# Patient Record
Sex: FEMALE | Race: WHITE | NOT HISPANIC OR LATINO | Employment: STUDENT | URBAN - METROPOLITAN AREA
[De-identification: names, ages, dates, MRNs, and addresses within clinical notes are randomized per-mention and may not be internally consistent; named-entity substitution may affect disease eponyms.]

---

## 2017-02-15 ENCOUNTER — PATIENT MESSAGE (OUTPATIENT)
Dept: OBSTETRICS AND GYNECOLOGY | Facility: CLINIC | Age: 22
End: 2017-02-15

## 2017-02-15 DIAGNOSIS — Z30.9 ENCOUNTER FOR CONTRACEPTIVE MANAGEMENT, UNSPECIFIED CONTRACEPTIVE ENCOUNTER TYPE: Primary | ICD-10-CM

## 2017-03-09 ENCOUNTER — TELEPHONE (OUTPATIENT)
Dept: INTERNAL MEDICINE | Facility: CLINIC | Age: 22
End: 2017-03-09

## 2017-03-09 ENCOUNTER — OFFICE VISIT (OUTPATIENT)
Dept: INTERNAL MEDICINE | Facility: CLINIC | Age: 22
End: 2017-03-09
Payer: COMMERCIAL

## 2017-03-09 ENCOUNTER — HOSPITAL ENCOUNTER (OUTPATIENT)
Dept: RADIOLOGY | Facility: HOSPITAL | Age: 22
Discharge: HOME | End: 2017-03-09
Attending: INTERNAL MEDICINE
Payer: COMMERCIAL

## 2017-03-09 VITALS
HEART RATE: 98 BPM | BODY MASS INDEX: 23.99 KG/M2 | WEIGHT: 135.38 LBS | DIASTOLIC BLOOD PRESSURE: 68 MMHG | SYSTOLIC BLOOD PRESSURE: 110 MMHG | HEIGHT: 63 IN | TEMPERATURE: 99 F | RESPIRATION RATE: 16 BRPM

## 2017-03-09 DIAGNOSIS — R07.81 PLEURITIC CHEST PAIN: ICD-10-CM

## 2017-03-09 DIAGNOSIS — J18.9 CAP (COMMUNITY ACQUIRED PNEUMONIA): ICD-10-CM

## 2017-03-09 DIAGNOSIS — J84.10 GRANULOMATOUS LUNG DISEASE: Primary | ICD-10-CM

## 2017-03-09 DIAGNOSIS — Z00.00 ANNUAL PHYSICAL EXAM: Primary | ICD-10-CM

## 2017-03-09 DIAGNOSIS — J84.10 GRANULOMATOUS LUNG DISEASE: ICD-10-CM

## 2017-03-09 DIAGNOSIS — F42.9 OBSESSIVE-COMPULSIVE DISORDER, UNSPECIFIED TYPE: ICD-10-CM

## 2017-03-09 DIAGNOSIS — D64.9 NORMOCYTIC ANEMIA: ICD-10-CM

## 2017-03-09 PROCEDURE — 71260 CT THORAX DX C+: CPT | Mod: TC

## 2017-03-09 PROCEDURE — 99999 PR PBB SHADOW E&M-EST. PATIENT-LVL III: CPT | Mod: PBBFAC,,, | Performed by: INTERNAL MEDICINE

## 2017-03-09 PROCEDURE — 25500020 PHARM REV CODE 255: Performed by: INTERNAL MEDICINE

## 2017-03-09 PROCEDURE — 71020 XR CHEST PA AND LATERAL: CPT | Mod: TC

## 2017-03-09 PROCEDURE — 71260 CT THORAX DX C+: CPT | Mod: 26,,, | Performed by: RADIOLOGY

## 2017-03-09 PROCEDURE — 71020 XR CHEST PA AND LATERAL: CPT | Mod: 26,,, | Performed by: RADIOLOGY

## 2017-03-09 PROCEDURE — 99385 PREV VISIT NEW AGE 18-39: CPT | Mod: S$GLB,,, | Performed by: INTERNAL MEDICINE

## 2017-03-09 RX ORDER — IBUPROFEN 600 MG/1
TABLET ORAL
Qty: 30 TABLET | Refills: 0 | Status: SHIPPED | OUTPATIENT
Start: 2017-03-09 | End: 2017-04-13

## 2017-03-09 RX ORDER — FLUOXETINE 10 MG/1
10 TABLET ORAL DAILY
Qty: 30 TABLET | Refills: 3 | Status: SHIPPED | OUTPATIENT
Start: 2017-03-09 | End: 2018-06-01

## 2017-03-09 RX ADMIN — IOHEXOL 75 ML: 350 INJECTION, SOLUTION INTRAVENOUS at 06:03

## 2017-03-09 NOTE — MR AVS SNAPSHOT
[facility]  [address]  [place] [address]  Phone: [phone]  Fax: [phone]                  Donna Kumar   3/9/2017 1:00 PM   Office Visit    Description:  Female : 1995   Provider:  Brooklynn Boyle MD   Department:  [facility]           Reason for Visit     Establish Care     Chest Pain     Fatigue           Diagnoses this Visit        Comments    Annual physical exam    -  Primary     Obsessive-compulsive disorder, unspecified type         Pleuritic chest pain                To Do List           Future Appointments        Provider Department Dept Phone    3/9/2017 1:50 PM LAB, APPOINTMENT [facility] INTMED [facility] [phone]    3/9/2017 2:15 PM NOM [id number] LB LIMIT [facility] [phone]    2017 3:20 PM MD [facility] Pending sale to [facility] [phone]      Goals (5 Years of Data)     None      Follow-Up and Disposition     Return in about 5 weeks (around 2017).    Follow-up and Disposition History       These Medications        Disp Refills Start End    fluoxetine 10 MG Tab 30 tablet 3 3/9/2017 3/9/2018    Take 1 tablet (10 mg total) by mouth once daily. - Oral    Pharmacy: [facility]/pharmacy #[id number] - [place] - [address] Ph #: [phone]       ibuprofen (ADVIL,MOTRIN) 600 MG tablet 30 tablet 0 3/9/2017     Take 1 tab every 8 hours with food for 2 days and then every 8 hours as needed for pain.    Pharmacy: [facility]/pharmacy #[id number] - [place]  [address] Ph #: [phone]         [facility] On Call     [facility] On Call Nurse Care Line -  Assistance  Registered nurses in the [facility] On Call Center provide clinical advisement, health education, appointment booking, and other advisory services.  Call for this free service at [phone].             Medications           Message regarding Medications     Verify the changes and/or additions to your medication regime  "listed below are the same as discussed with your clinician today.  If any of these changes or additions are incorrect, please notify your healthcare provider.        START taking these NEW medications        Refills    fluoxetine 10 MG Tab 3    Sig: Take 1 tablet (10 mg total) by mouth once daily.    Class: Normal    Route: Oral    ibuprofen (ADVIL,MOTRIN) 600 MG tablet 0    Sig: Take 1 tab every 8 hours with food for 2 days and then every 8 hours as needed for pain.    Class: Normal      STOP taking these medications     ketoconazole (NIZORAL) 2 % cream AAA bid    drospirenone-ethinyl estradiol (RAJAN) 3-0.03 mg per tablet Take 1 tablet by mouth once daily.           Verify that the below list of medications is an accurate representation of the medications you are currently taking.  If none reported, the list may be blank. If incorrect, please contact your healthcare provider. Carry this list with you in case of emergency.           Current Medications     ACZONE 5 % topical gel Apply topically every morning.    norethindrone-ethinyl estradiol (OVCON) 0.4-35 mg-mcg per tablet Take 1 tablet by mouth once daily.    sulfacetamide sodium-sulfur (SULFACLEANSE 8-4) 8-4 % Susp Wash face qhs    tazarotene (TAZORAC) 0.05 % Crea cream Apply topically every evening. Apply thin film to face qhs after moisturizing.    fluoxetine 10 MG Tab Take 1 tablet (10 mg total) by mouth once daily.    ibuprofen (ADVIL,MOTRIN) 600 MG tablet Take 1 tab every 8 hours with food for 2 days and then every 8 hours as needed for pain.           Clinical Reference Information           Your Vitals Were     BP Pulse Temp Resp Height Weight    110/68 98 98.7 °F (37.1 °C) (Oral) 16 5' 3" (1.6 m) 61.4 kg (135 lb 5.8 oz)    Last Period BMI             02/23/2017 23.98 kg/m2         Blood Pressure          Most Recent Value    BP  110/68      Allergies as of 3/9/2017     No Known Drug Allergies      Immunizations Administered on Date of Encounter - " 3/9/2017     None      Orders Placed During Today's Visit     Future Labs/Procedures Expected by Expires    CBC auto differential  3/9/2017 5/8/2018    Comprehensive metabolic panel  3/9/2017 5/8/2018    Lipid panel  3/9/2017 5/8/2018    TSH  3/9/2017 5/8/2018    X-Ray Chest PA And Lateral  3/9/2017 3/9/2018      Language Assistance Services     ATTENTION: Language assistance services are available, free of charge. Please call [phone].      ATENCIÓN: Si habla español, tiene a hebert disposición servicios gratuitos de asistencia lingüística. Llame al [phone].     CHÚ Ý: N?u b?n nói Ti?ng Vi?t, có các d?ch v? h? tr? ngôn ng? mi?n phí dành cho b?n. G?i s? [phone].         [facility] complies with applicable Federal civil rights laws and does not discriminate on the basis of race, color, national origin, age, disability, or sex.

## 2017-03-09 NOTE — TELEPHONE ENCOUNTER
Reviewed CXR. Ordered CT w/ contrast. Pt going back to [place] tomorrow night. Will try to get the CT tonight or tomorrow.

## 2017-03-09 NOTE — PROGRESS NOTES
INTERNAL MEDICINE INITIAL VISIT NOTE      CHIEF COMPLAINT     Chief Complaint   Patient presents with    [facility]    Chest Pain     when taking a deep breath post chest congestion 4 weeks ago    Fatigue     HPI     Donna Kumar is a 21 y.o. C female who presents to [facility] and for annual.    4 weeks of congestion and sinusitis. S/p zpack. Congestion better and clear rhinorrhea a week ago. Last week, she noted substernal cp when she inspires. When she exercises, she feels a ripping chest pain in the same area. Ripping CP improves after she stops exercise but does not go away. When she takes in a deep breath, she gets a discomfort in the mid substernal area (same area). No wheezing during exercise. Some SOB. Still w/ cough. Nonproductive. Felt fevers (temp 99 highest). No sore throat.     A week ago, she also started feeling more fatigued. Decreased exercise tolerated.     Mom w/ h/o melanoma. Pt follows w/ dermatology. Dad has UC.     Previously seen psychiatrist for compulsive eating. Previously on fluoxetine. Hasn't been on meds x 2 yrs. Due to taking MCATs, she has more compulsive eating. Stress. No panic attacks. No depression. Seeing a counselor at school.     Past Medical History:  Past Medical History:   Diagnosis Date    Mastocytosis     Obsessive compulsive disorder     followed by Dr. Malone       Past Surgical History:  History reviewed. No pertinent surgical history.    Allergies:  Review of patient's allergies indicates:   Allergen Reactions    No known drug allergies        meds reviewed.    Family History:  Family History   Problem Relation Age of Onset    Hyperlipidemia Mother     Melanoma Mother     Allergies Mother     Cancer Mother     Other Father      ulcerative colitis    Allergies Brother     Depression Brother     Eczema Brother     Obesity Brother     Mental illness Sister      OCD, PANDAS    Psoriasis Neg Hx     Lupus Neg Hx      Social History:  Social  "History   Substance Use Topics    Smoking status: Never Smoker    Smokeless tobacco: None    Alcohol use No     Review of Systems:  Review of Systems   Constitutional: Negative for chills, fever and unexpected weight change.   HENT: Positive for rhinorrhea. Negative for congestion, postnasal drip and sore throat.    Respiratory: Positive for cough. Negative for shortness of breath and wheezing.    Cardiovascular: Positive for chest pain and palpitations (only after eating pancakes or something that's heavy). Negative for leg swelling.   Gastrointestinal: Positive for constipation (after antibiotics). Negative for abdominal pain, diarrhea, nausea and vomiting.   Genitourinary: Negative for dysuria and frequency.   Musculoskeletal: Negative for arthralgias and myalgias.   Skin: Negative for rash.   Allergic/Immunologic: Negative for environmental allergies.   Neurological: Positive for light-headedness (if she stands up too quickly). Negative for dizziness, weakness and numbness.   Psychiatric/Behavioral: Negative for dysphoric mood. The patient is not nervous/anxious.        Health Maintainence:   Tdap prior to college  Reports UTD on Gardasil.   Didn't get flu.     PHYSICAL EXAM     /68  Pulse 98  Temp 98.7 °F (37.1 °C) (Oral)   Resp 16  Ht 5' 3" (1.6 m)  Wt 61.4 kg (135 lb 5.8 oz)  LMP 02/23/2017  BMI 23.98 kg/m2    GEN - A+OX4, NAD   HEENT - PERRL, EOMI, OP clear. MMM. TM normal.   Neck - No thyromegaly or cervical LAD. No thyroid masses felt.  CV - RRR, no m/r   Chest - CTAB, no wheezing or rhonchi. Sternum tenderness to palpation.  Abd - S/NT/ND/+BS.   Ext - 2+BDP and radial pulses. No LE edema.   Neuro - 5/5 BUE and BLE strength. 2+ DTRs.  MSK - no spinal tenderness to palpation.  Skin - No rash.    LABS     Previous labs reviewed.     ASSESSMENT/PLAN     Donna Kumar is a 21 y.o. female with  Donna MONTAÑO was seen today for establish care, chest pain and fatigue.    Diagnoses and all orders " for this visit:    Annual physical exam  -     Lipid panel; Future; Expected date: 3/9/17  -     CBC auto differential; Future; Expected date: 3/9/17  -     Comprehensive metabolic panel; Future; Expected date: 3/9/17  -     TSH; Future; Expected date: 3/9/17    Obsessive-compulsive disorder, unspecified type  -     fluoxetine 10 MG Tab; Take 1 tablet (10 mg total) by mouth once daily.    Pleuritic chest pain  -     X-Ray Chest PA And Lateral; Future; Expected date: 3/9/17  -     ibuprofen (ADVIL,MOTRIN) 600 MG tablet; Take 1 tab every 8 hours with food for 2 days and then every 8 hours as needed for pain.    RTC in 6 weeks, sooner if needed and depending on labs. Pt is away for school at [employer] and will be back right before [facility]. Will try to accommodate schedule.     Brooklynn Boyle MD  Department of Internal Medicine - [facility]  1:12 PM

## 2017-03-10 RX ORDER — LEVOFLOXACIN 750 MG/1
750 TABLET ORAL DAILY
Qty: 10 TABLET | Refills: 0 | Status: SHIPPED | OUTPATIENT
Start: 2017-03-10 | End: 2017-03-20

## 2017-03-10 NOTE — TELEPHONE ENCOUNTER
CT chest w/ large necrotic LN and air bronchogram. Given 4 weeks h/o sinusitis and then 2 weeks of pleuritic CP, will send in levaquin 750mg daily x 10 days. Pt to est w/ pulm in [place] to be followed. Repeat CT scan when returns in April w/ me. Mild anemia. Will check cbc and anemia studies at next visit. Spoke w/ pt.

## 2017-03-13 DIAGNOSIS — R59.9 ENLARGED LYMPH NODE: Primary | ICD-10-CM

## 2017-03-16 ENCOUNTER — LAB VISIT (OUTPATIENT)
Dept: LAB | Facility: HOSPITAL | Age: 22
End: 2017-03-16
Attending: INTERNAL MEDICINE
Payer: COMMERCIAL

## 2017-03-16 ENCOUNTER — OFFICE VISIT (OUTPATIENT)
Dept: PULMONOLOGY | Facility: CLINIC | Age: 22
End: 2017-03-16
Payer: COMMERCIAL

## 2017-03-16 ENCOUNTER — ANESTHESIA EVENT (OUTPATIENT)
Dept: SURGERY | Facility: HOSPITAL | Age: 22
End: 2017-03-16
Payer: COMMERCIAL

## 2017-03-16 ENCOUNTER — TELEPHONE (OUTPATIENT)
Dept: INTERNAL MEDICINE | Facility: CLINIC | Age: 22
End: 2017-03-16

## 2017-03-16 VITALS
DIASTOLIC BLOOD PRESSURE: 76 MMHG | OXYGEN SATURATION: 99 % | SYSTOLIC BLOOD PRESSURE: 111 MMHG | HEIGHT: 63 IN | BODY MASS INDEX: 23.86 KG/M2 | WEIGHT: 134.69 LBS | HEART RATE: 94 BPM

## 2017-03-16 DIAGNOSIS — D64.9 NORMOCYTIC ANEMIA: ICD-10-CM

## 2017-03-16 DIAGNOSIS — E61.1 IRON DEFICIENCY: ICD-10-CM

## 2017-03-16 DIAGNOSIS — E53.8 VITAMIN B 12 DEFICIENCY: ICD-10-CM

## 2017-03-16 DIAGNOSIS — E53.8 B12 DEFICIENCY: ICD-10-CM

## 2017-03-16 DIAGNOSIS — R59.0 MEDIASTINAL ADENOPATHY: Primary | ICD-10-CM

## 2017-03-16 DIAGNOSIS — D50.0 IRON DEFICIENCY ANEMIA DUE TO CHRONIC BLOOD LOSS: Primary | ICD-10-CM

## 2017-03-16 LAB
BASOPHILS # BLD AUTO: 0.04 K/UL
BASOPHILS NFR BLD: 0.4 %
DIFFERENTIAL METHOD: ABNORMAL
EOSINOPHIL # BLD AUTO: 0.2 K/UL
EOSINOPHIL NFR BLD: 1.5 %
ERYTHROCYTE [DISTWIDTH] IN BLOOD BY AUTOMATED COUNT: 13.8 %
FERRITIN SERPL-MCNC: 41 NG/ML
FOLATE SERPL-MCNC: 8.1 NG/ML
HCT VFR BLD AUTO: 36.5 %
HGB BLD-MCNC: 11.9 G/DL
IRON SERPL-MCNC: 33 UG/DL
LYMPHOCYTES # BLD AUTO: 1.4 K/UL
LYMPHOCYTES NFR BLD: 12.6 %
MCH RBC QN AUTO: 27.9 PG
MCHC RBC AUTO-ENTMCNC: 32.6 %
MCV RBC AUTO: 86 FL
MONOCYTES # BLD AUTO: 0.6 K/UL
MONOCYTES NFR BLD: 5.4 %
NEUTROPHILS # BLD AUTO: 9.1 K/UL
NEUTROPHILS NFR BLD: 79.8 %
PLATELET # BLD AUTO: 398 K/UL
PMV BLD AUTO: 9.3 FL
RBC # BLD AUTO: 4.26 M/UL
SATURATED IRON: 6 %
TOTAL IRON BINDING CAPACITY: 540 UG/DL
TRANSFERRIN SERPL-MCNC: 365 MG/DL
VIT B12 SERPL-MCNC: 165 PG/ML
WBC # BLD AUTO: 11.39 K/UL

## 2017-03-16 PROCEDURE — 82728 ASSAY OF FERRITIN: CPT

## 2017-03-16 PROCEDURE — 84466 ASSAY OF TRANSFERRIN: CPT

## 2017-03-16 PROCEDURE — 85025 COMPLETE CBC W/AUTO DIFF WBC: CPT

## 2017-03-16 PROCEDURE — 99999 PR PBB SHADOW E&M-EST. PATIENT-LVL II: CPT | Mod: PBBFAC,,, | Performed by: INTERNAL MEDICINE

## 2017-03-16 PROCEDURE — 99204 OFFICE O/P NEW MOD 45 MIN: CPT | Mod: S$GLB,,, | Performed by: INTERNAL MEDICINE

## 2017-03-16 PROCEDURE — 82746 ASSAY OF FOLIC ACID SERUM: CPT

## 2017-03-16 PROCEDURE — 82607 VITAMIN B-12: CPT

## 2017-03-16 PROCEDURE — 83540 ASSAY OF IRON: CPT

## 2017-03-16 PROCEDURE — 36415 COLL VENOUS BLD VENIPUNCTURE: CPT

## 2017-03-16 RX ORDER — FERROUS SULFATE 325(65) MG
325 TABLET ORAL
Qty: 90 TABLET | Refills: 0 | Status: SHIPPED | OUTPATIENT
Start: 2017-03-16 | End: 2017-06-17 | Stop reason: SDUPTHER

## 2017-03-16 RX ORDER — LANOLIN ALCOHOL/MO/W.PET/CERES
1000 CREAM (GRAM) TOPICAL DAILY
Qty: 90 TABLET | Refills: 3 | Status: SHIPPED | OUTPATIENT
Start: 2017-03-16 | End: 2018-03-19

## 2017-03-16 NOTE — PROGRESS NOTES
"Subjective:       Patient ID: Donna Kumar is a 21 y.o. female.    Chief Complaint: Enlarged Lymph Node    HPI Comments: 21 year old with a one month history of chest discomfort cough and congestion.  Diffuse myalgias and fatigue.  Not sleeping well at night.  No fevers.  Night sweats.  Traveled through [place] and stayed in hostels.  Treated with Zpak and levaquin.  Never tested for mono.      Review of Systems   Constitutional: Positive for night sweats. Negative for fever, weight loss and weight gain.   HENT: Positive for trouble swallowing.    Respiratory: Positive for shortness of breath and wheezing.    Cardiovascular: Positive for chest pain. Negative for leg swelling.   Genitourinary: Negative for difficulty urinating.   Endocrine: Negative for cold intolerance and heat intolerance.    Musculoskeletal: Negative for arthralgias and joint swelling.   Gastrointestinal: Positive for acid reflux.   Neurological: Negative for headaches.   Hematological: Negative for adenopathy.   Psychiatric/Behavioral: Negative for confusion.       Objective:       Vitals:    03/16/17 0757   BP: 111/76   Pulse: 94   SpO2: 99%   Weight: 61.1 kg (134 lb 11.2 oz)   Height: 5' 3" (1.6 m)     Physical Exam   Constitutional: She is oriented to person, place, and time. She appears well-developed and well-nourished.   HENT:   Head: Normocephalic.   Nose: Nose normal.   Neck: Normal range of motion. Neck supple. No tracheal deviation present.   Cardiovascular: Normal rate, regular rhythm, normal heart sounds and intact distal pulses.    Pulmonary/Chest: Normal expansion, symmetric chest wall expansion, effort normal and breath sounds normal.   Abdominal: Soft. Bowel sounds are normal. There is no hepatosplenomegaly.   Musculoskeletal: Normal range of motion. She exhibits no edema.   Lymphadenopathy: No supraclavicular adenopathy is present.     She has no cervical adenopathy.   Neurological: She is alert and oriented to person, place, " and time. No cranial nerve deficit.   Skin: Skin is warm and dry.   Psychiatric: She has a normal mood and affect.     Personal Diagnostic Review  CT of chest performed on 3/9/2017 without contrast revealed large subcarinal lymph node with septations.  Right hilar lymph node with possible endobronchial component.  No flowsheet data found.      Assessment:       1. Mediastinal adenopathy        Outpatient Encounter Prescriptions as of 3/16/2017   Medication Sig Dispense Refill    ACZONE 5 % topical gel Apply topically every morning. 60 g 3    fluoxetine 10 MG Tab Take 1 tablet (10 mg total) by mouth once daily. 30 tablet 3    levoFLOXacin (LEVAQUIN) 750 MG tablet Take 1 tablet (750 mg total) by mouth once daily. 10 tablet 0    norethindrone-ethinyl estradiol (OVCON) 0.4-35 mg-mcg per tablet Take 1 tablet by mouth once daily. 30 tablet 11    sulfacetamide sodium-sulfur (SULFACLEANSE 8-4) 8-4 % Susp Wash face qhs 473 mL 3    tazarotene (TAZORAC) 0.05 % Crea cream Apply topically every evening. Apply thin film to face qhs after moisturizing. 60 g 1    ibuprofen (ADVIL,MOTRIN) 600 MG tablet Take 1 tab every 8 hours with food for 2 days and then every 8 hours as needed for pain. 30 tablet 0     No facility-administered encounter medications on file as of 3/16/2017.      No orders of the defined types were placed in this encounter.    Plan:       Viral antigens to be drawn tomorrow preoperative.  Plan for EBUS with mediastinal adenopathy with night sweats.  Call with results.  Patient oriented to procedure and patient verbalized an understanding.  All questions were answered to patient's satisfaction.  Written consent was signed and will be placed on chart.  No personal or family history of anesthesia complications  Will call with results.

## 2017-03-16 NOTE — TELEPHONE ENCOUNTER
B12 is low. Replace. Ferritin low side of normal w/ elevated TIBC. Start ferrous sulfate daily.    Called pt. No answer. Left message for pt to check portal.

## 2017-03-17 ENCOUNTER — ANESTHESIA (OUTPATIENT)
Dept: SURGERY | Facility: HOSPITAL | Age: 22
End: 2017-03-17
Payer: COMMERCIAL

## 2017-03-17 ENCOUNTER — HOSPITAL ENCOUNTER (OUTPATIENT)
Facility: HOSPITAL | Age: 22
Discharge: HOME | End: 2017-03-17
Attending: INTERNAL MEDICINE | Admitting: INTERNAL MEDICINE
Payer: COMMERCIAL

## 2017-03-17 ENCOUNTER — SURGERY (OUTPATIENT)
Age: 22
End: 2017-03-17

## 2017-03-17 VITALS
WEIGHT: 135 LBS | OXYGEN SATURATION: 97 % | HEIGHT: 63 IN | TEMPERATURE: 98 F | SYSTOLIC BLOOD PRESSURE: 125 MMHG | RESPIRATION RATE: 21 BRPM | HEART RATE: 86 BPM | DIASTOLIC BLOOD PRESSURE: 66 MMHG | BODY MASS INDEX: 23.92 KG/M2

## 2017-03-17 DIAGNOSIS — R59.0 MEDIASTINAL ADENOPATHY: ICD-10-CM

## 2017-03-17 LAB
B-HCG UR QL: NEGATIVE
CTP QC/QA: YES
KOH PREP SPEC: NORMAL

## 2017-03-17 PROCEDURE — 25000003 PHARM REV CODE 250: Performed by: INTERNAL MEDICINE

## 2017-03-17 PROCEDURE — 31625 BRONCHOSCOPY W/BIOPSY(S): CPT | Mod: 59,RT,, | Performed by: INTERNAL MEDICINE

## 2017-03-17 PROCEDURE — 88185 FLOWCYTOMETRY/TC ADD-ON: CPT | Performed by: PATHOLOGY

## 2017-03-17 PROCEDURE — 71000033 HC RECOVERY, INTIAL HOUR: Performed by: INTERNAL MEDICINE

## 2017-03-17 PROCEDURE — 87210 SMEAR WET MOUNT SALINE/INK: CPT

## 2017-03-17 PROCEDURE — D9220A PRA ANESTHESIA: Mod: CRNA,,, | Performed by: NURSE ANESTHETIST, CERTIFIED REGISTERED

## 2017-03-17 PROCEDURE — 87075 CULTR BACTERIA EXCEPT BLOOD: CPT

## 2017-03-17 PROCEDURE — 87798 DETECT AGENT NOS DNA AMP: CPT | Mod: 91

## 2017-03-17 PROCEDURE — 87070 CULTURE OTHR SPECIMN AEROBIC: CPT | Mod: 59

## 2017-03-17 PROCEDURE — 88305 TISSUE EXAM BY PATHOLOGIST: CPT | Performed by: PATHOLOGY

## 2017-03-17 PROCEDURE — 27800903 OPTIME MED/SURG SUP & DEVICES OTHER IMPLANTS: Performed by: INTERNAL MEDICINE

## 2017-03-17 PROCEDURE — 25000003 PHARM REV CODE 250: Performed by: NURSE ANESTHETIST, CERTIFIED REGISTERED

## 2017-03-17 PROCEDURE — 88305 TISSUE EXAM BY PATHOLOGIST: CPT | Mod: 26,,, | Performed by: PATHOLOGY

## 2017-03-17 PROCEDURE — 88184 FLOWCYTOMETRY/ TC 1 MARKER: CPT | Performed by: PATHOLOGY

## 2017-03-17 PROCEDURE — 36000707: Performed by: INTERNAL MEDICINE

## 2017-03-17 PROCEDURE — 88173 CYTOPATH EVAL FNA REPORT: CPT | Mod: 26,,, | Performed by: PATHOLOGY

## 2017-03-17 PROCEDURE — 86777 TOXOPLASMA ANTIBODY: CPT

## 2017-03-17 PROCEDURE — 87496 CYTOMEG DNA AMP PROBE: CPT

## 2017-03-17 PROCEDURE — 88312 SPECIAL STAINS GROUP 1: CPT | Mod: 26,,, | Performed by: PATHOLOGY

## 2017-03-17 PROCEDURE — 31652 BRONCH EBUS SAMPLNG 1/2 NODE: CPT | Mod: ,,, | Performed by: INTERNAL MEDICINE

## 2017-03-17 PROCEDURE — 36415 COLL VENOUS BLD VENIPUNCTURE: CPT

## 2017-03-17 PROCEDURE — 87798 DETECT AGENT NOS DNA AMP: CPT

## 2017-03-17 PROCEDURE — 88172 CYTP DX EVAL FNA 1ST EA SITE: CPT | Mod: 26,,, | Performed by: PATHOLOGY

## 2017-03-17 PROCEDURE — 36000706: Performed by: INTERNAL MEDICINE

## 2017-03-17 PROCEDURE — 87070 CULTURE OTHR SPECIMN AEROBIC: CPT

## 2017-03-17 PROCEDURE — 94760 N-INVAS EAR/PLS OXIMETRY 1: CPT

## 2017-03-17 PROCEDURE — 63600175 PHARM REV CODE 636 W HCPCS: Performed by: NURSE ANESTHETIST, CERTIFIED REGISTERED

## 2017-03-17 PROCEDURE — 87205 SMEAR GRAM STAIN: CPT

## 2017-03-17 PROCEDURE — 88331 PATH CONSLTJ SURG 1 BLK 1SPC: CPT | Mod: 26,,, | Performed by: PATHOLOGY

## 2017-03-17 PROCEDURE — 87116 MYCOBACTERIA CULTURE: CPT

## 2017-03-17 PROCEDURE — 37000009 HC ANESTHESIA EA ADD 15 MINS: Performed by: INTERNAL MEDICINE

## 2017-03-17 PROCEDURE — 27201423 OPTIME MED/SURG SUP & DEVICES STERILE SUPPLY: Performed by: INTERNAL MEDICINE

## 2017-03-17 PROCEDURE — D9220A PRA ANESTHESIA: Mod: ANES,,, | Performed by: ANESTHESIOLOGY

## 2017-03-17 PROCEDURE — 71000039 HC RECOVERY, EACH ADD'L HOUR: Performed by: INTERNAL MEDICINE

## 2017-03-17 PROCEDURE — 87799 DETECT AGENT NOS DNA QUANT: CPT

## 2017-03-17 PROCEDURE — 71000015 HC POSTOP RECOV 1ST HR: Performed by: INTERNAL MEDICINE

## 2017-03-17 PROCEDURE — 87102 FUNGUS ISOLATION CULTURE: CPT

## 2017-03-17 PROCEDURE — 81025 URINE PREGNANCY TEST: CPT | Performed by: INTERNAL MEDICINE

## 2017-03-17 PROCEDURE — 86665 EPSTEIN-BARR CAPSID VCA: CPT

## 2017-03-17 PROCEDURE — 87015 SPECIMEN INFECT AGNT CONCNTJ: CPT

## 2017-03-17 PROCEDURE — 88189 FLOWCYTOMETRY/READ 16 & >: CPT | Mod: ,,, | Performed by: PATHOLOGY

## 2017-03-17 PROCEDURE — 37000008 HC ANESTHESIA 1ST 15 MINUTES: Performed by: INTERNAL MEDICINE

## 2017-03-17 PROCEDURE — 86645 CMV ANTIBODY IGM: CPT

## 2017-03-17 PROCEDURE — 27000221 HC OXYGEN, UP TO 24 HOURS

## 2017-03-17 RX ORDER — PROPOFOL 10 MG/ML
VIAL (ML) INTRAVENOUS CONTINUOUS PRN
Status: DISCONTINUED | OUTPATIENT
Start: 2017-03-17 | End: 2017-03-17

## 2017-03-17 RX ORDER — PROPOFOL 10 MG/ML
VIAL (ML) INTRAVENOUS
Status: DISCONTINUED | OUTPATIENT
Start: 2017-03-17 | End: 2017-03-17

## 2017-03-17 RX ORDER — LIDOCAINE HCL/PF 100 MG/5ML
SYRINGE (ML) INTRAVENOUS
Status: DISCONTINUED | OUTPATIENT
Start: 2017-03-17 | End: 2017-03-17

## 2017-03-17 RX ORDER — KETAMINE HYDROCHLORIDE 100 MG/ML
INJECTION, SOLUTION INTRAMUSCULAR; INTRAVENOUS
Status: DISCONTINUED | OUTPATIENT
Start: 2017-03-17 | End: 2017-03-17

## 2017-03-17 RX ORDER — HYDROMORPHONE HYDROCHLORIDE 1 MG/ML
0.2 INJECTION, SOLUTION INTRAMUSCULAR; INTRAVENOUS; SUBCUTANEOUS EVERY 5 MIN PRN
Status: DISCONTINUED | OUTPATIENT
Start: 2017-03-17 | End: 2017-03-17 | Stop reason: HOSPADM

## 2017-03-17 RX ORDER — FENTANYL CITRATE 50 UG/ML
INJECTION, SOLUTION INTRAMUSCULAR; INTRAVENOUS
Status: DISCONTINUED | OUTPATIENT
Start: 2017-03-17 | End: 2017-03-17

## 2017-03-17 RX ORDER — ONDANSETRON 2 MG/ML
INJECTION INTRAMUSCULAR; INTRAVENOUS
Status: DISCONTINUED | OUTPATIENT
Start: 2017-03-17 | End: 2017-03-17

## 2017-03-17 RX ORDER — LIDOCAINE HYDROCHLORIDE 10 MG/ML
1 INJECTION, SOLUTION EPIDURAL; INFILTRATION; INTRACAUDAL; PERINEURAL ONCE
Status: DISCONTINUED | OUTPATIENT
Start: 2017-03-17 | End: 2017-03-17 | Stop reason: HOSPADM

## 2017-03-17 RX ORDER — MIDAZOLAM HYDROCHLORIDE 1 MG/ML
INJECTION, SOLUTION INTRAMUSCULAR; INTRAVENOUS
Status: DISCONTINUED | OUTPATIENT
Start: 2017-03-17 | End: 2017-03-17

## 2017-03-17 RX ORDER — DEXAMETHASONE SODIUM PHOSPHATE 4 MG/ML
INJECTION, SOLUTION INTRA-ARTICULAR; INTRALESIONAL; INTRAMUSCULAR; INTRAVENOUS; SOFT TISSUE
Status: DISCONTINUED | OUTPATIENT
Start: 2017-03-17 | End: 2017-03-17

## 2017-03-17 RX ORDER — SODIUM CHLORIDE 9 MG/ML
INJECTION, SOLUTION INTRAVENOUS CONTINUOUS
Status: DISCONTINUED | OUTPATIENT
Start: 2017-03-17 | End: 2017-03-17 | Stop reason: HOSPADM

## 2017-03-17 RX ORDER — LIDOCAINE HYDROCHLORIDE 10 MG/ML
INJECTION INFILTRATION; PERINEURAL
Status: DISCONTINUED | OUTPATIENT
Start: 2017-03-17 | End: 2017-03-17 | Stop reason: HOSPADM

## 2017-03-17 RX ADMIN — PROPOFOL 200 MG: 10 INJECTION, EMULSION INTRAVENOUS at 08:03

## 2017-03-17 RX ADMIN — FENTANYL CITRATE 25 MCG: 50 INJECTION, SOLUTION INTRAMUSCULAR; INTRAVENOUS at 09:03

## 2017-03-17 RX ADMIN — MIDAZOLAM HYDROCHLORIDE 2 MG: 1 INJECTION, SOLUTION INTRAMUSCULAR; INTRAVENOUS at 08:03

## 2017-03-17 RX ADMIN — DEXAMETHASONE SODIUM PHOSPHATE 8 MG: 4 INJECTION, SOLUTION INTRAMUSCULAR; INTRAVENOUS at 09:03

## 2017-03-17 RX ADMIN — MIDAZOLAM HYDROCHLORIDE 1 MG: 1 INJECTION, SOLUTION INTRAMUSCULAR; INTRAVENOUS at 09:03

## 2017-03-17 RX ADMIN — FENTANYL CITRATE 50 MCG: 50 INJECTION, SOLUTION INTRAMUSCULAR; INTRAVENOUS at 09:03

## 2017-03-17 RX ADMIN — MIDAZOLAM HYDROCHLORIDE 1 MG: 1 INJECTION, SOLUTION INTRAMUSCULAR; INTRAVENOUS at 08:03

## 2017-03-17 RX ADMIN — ONDANSETRON 4 MG: 2 INJECTION INTRAMUSCULAR; INTRAVENOUS at 09:03

## 2017-03-17 RX ADMIN — KETAMINE HYDROCHLORIDE 25 MG: 100 INJECTION, SOLUTION, CONCENTRATE INTRAMUSCULAR; INTRAVENOUS at 08:03

## 2017-03-17 RX ADMIN — SODIUM CHLORIDE, SODIUM GLUCONATE, SODIUM ACETATE, POTASSIUM CHLORIDE, MAGNESIUM CHLORIDE, SODIUM PHOSPHATE, DIBASIC, AND POTASSIUM PHOSPHATE: .53; .5; .37; .037; .03; .012; .00082 INJECTION, SOLUTION INTRAVENOUS at 09:03

## 2017-03-17 RX ADMIN — FENTANYL CITRATE 75 MCG: 50 INJECTION, SOLUTION INTRAMUSCULAR; INTRAVENOUS at 09:03

## 2017-03-17 RX ADMIN — LIDOCAINE HYDROCHLORIDE 75 MG: 20 INJECTION, SOLUTION INTRAVENOUS at 08:03

## 2017-03-17 RX ADMIN — LIDOCAINE HYDROCHLORIDE 8 ML: 10 INJECTION, SOLUTION INFILTRATION; PERINEURAL at 09:03

## 2017-03-17 RX ADMIN — SODIUM CHLORIDE: 0.9 INJECTION, SOLUTION INTRAVENOUS at 08:03

## 2017-03-17 RX ADMIN — PROPOFOL 250 MCG/KG/MIN: 10 INJECTION, EMULSION INTRAVENOUS at 08:03

## 2017-03-17 NOTE — ANESTHESIA POSTPROCEDURE EVALUATION
"Anesthesia Post Evaluation    Patient: Donna Kumar    Procedure(s) Performed: Procedure(s) (LRB):  ENDOBRONCHIAL ULTRASOUND (EBUS) (N/A)  BRONCHOSCOPY (N/A)    Final Anesthesia Type: general  Patient location during evaluation: PACU  Patient participation: Yes- Able to Participate  Level of consciousness: awake and alert  Post-procedure vital signs: reviewed and stable  Pain management: adequate  Airway patency: patent  PONV status at discharge: No PONV  Anesthetic complications: no      Cardiovascular status: blood pressure returned to baseline  Respiratory status: unassisted, spontaneous ventilation and room air  Hydration status: euvolemic  Follow-up not needed.        Visit Vitals    BP 99/71 (BP Location: Left arm, Patient Position: Lying, BP Method: Automatic)    Pulse 90    Temp 36.4 °C (97.6 °F) (Axillary)    Resp (!) 23    Ht 5' 3" (1.6 m)    Wt 61.2 kg (135 lb)    LMP 03/08/2017 (Exact Date)    SpO2 97%    Breastfeeding No    BMI 23.91 kg/m2       Pain/Cecilia Score: Pain Assessment Performed: Yes (3/17/2017 11:14 AM)  Presence of Pain: denies (3/17/2017 11:14 AM)  Cecilia Score: 10 (3/17/2017 11:14 AM)      "

## 2017-03-17 NOTE — ANESTHESIA RELEASE NOTE
"Anesthesia Release from PACU Note    Patient: Donna Kumar    Procedure(s) Performed: Procedure(s) (LRB):  ENDOBRONCHIAL ULTRASOUND (EBUS) (N/A)  BRONCHOSCOPY (N/A)    Anesthesia type: GEN    Post pain: Adequate analgesia reported    Post assessment: no apparent anesthetic complications, tolerated procedure well and no evidence of recall    Post vital signs: BP 99/71 (BP Location: Left arm, Patient Position: Lying, BP Method: Automatic)  Pulse 90  Temp 36.4 °C (97.6 °F) (Axillary)   Resp (!) 23  Ht 5' 3" (1.6 m)  Wt 61.2 kg (135 lb)  LMP 03/08/2017 (Exact Date)  SpO2 97%  Breastfeeding? No  BMI 23.91 kg/m2    Level of consciousness: awake, alert and oriented    Nausea/Vomiting: no nausea/no vomiting    Complications: none    Airway Patency: patent    Respiratory: unassisted, spontaneous ventilation, room air    Cardiovascular: stable and blood pressure at baseline    Hydration: euvolemic    "

## 2017-03-17 NOTE — DISCHARGE INSTRUCTIONS
Flexible Bronchoscopy  A flexible bronchoscopy is an exam of the airways of your lungs. A thin, flexible instrument called a bronchoscope is used. It has a light and small camera that allow the healthcare provider to view your airways.  Call your doctor if you have shortness of breath, a temperature above 101.0°F (38.3°C) for more than 24 hours, or bleeding from your nose or throat. If you have chest pain or severe shortness of breath, call right away.   Before your test    · Follow your healthcare provider's instructions carefully. If you dont, the exam may be canceled or need to be repeated.  · If you are taking blood-thinning medicine, ask your health care provider whether you should stop taking the medicine before this test.  · Have no food or drink for 6 to 12 hours before the test. Also, avoid smoking for 24 hours before the test.  · You will need to remove any dentures or bridgework.  · Right before the test, you will be given sedating medications to help you relax. The medication may be given intravenously (IV) into one of your veins. In addition, your nose and throat may be numbed with a special spray to help prevent gagging and coughing.  · If you are having this test as an outpatient, make sure you have an adult friend or family member to drive you home.  During your test  Bronchoscopy takes 45 to 60 minutes and includes the following steps:  · You may receive anesthesia so that you are unconscious or asleep during the procedure.  · The healthcare provider inserts the tube into your nose or mouth.  · If you have not received anesthesia, you might feel a gagging sensation. To help relieve this feeling, you will be told to swallow or take deep breaths. Your airway will remain open even with the tube in place. But you wont be able to talk.  · The provider examines your breathing passages. He or she may also remove tiny tissue samples for biopsy.  After your test  · You may have a mild sore throat. Your  voice may also be hoarse. And, you may have a cough.  · Do not eat or drink until the anesthesia wears off.  · If you had a biopsy, avoid coughing hard and clearing your throat.  · Call your healthcare provider if you have:  ¨ Shortness of breath  ¨ Chest pain  ¨ Bleeding from your nose or throat  ¨ A fever  Date Last Reviewed: 7/24/2014  © 3991-7507 [employer]. [address] [place] [address], [place] [address]. All rights reserved. This information is not intended as a substitute for professional medical care. Always follow your healthcare professional's instructions.

## 2017-03-17 NOTE — INTERVAL H&P NOTE
The patient has been examined and the H&P has been reviewed:    I concur with the findings and no changes have occurred since H&P was written.    Anesthesia/Surgery risks, benefits and alternative options discussed and understood by patient/family.          Active Hospital Problems    Diagnosis  POA    Mediastinal adenopathy [R59.0]  Yes      Resolved Hospital Problems    Diagnosis Date Resolved POA   No resolved problems to display.     Labs drawn today.

## 2017-03-17 NOTE — ANESTHESIA PREPROCEDURE EVALUATION
03/17/2017  Donna Kumar is a 21 y.o., female.    [facility] Anesthesia Evaluation    I have reviewed the Patient Summary Reports.     I have reviewed the Medications.     Review of Systems  Anesthesia Hx:  No previous Anesthesia    Cardiovascular:  Cardiovascular Normal     Renal/:  Renal/ Normal     Psych:   Psychiatric History          Physical Exam  General:  Well nourished    Airway/Jaw/Neck:  Airway Findings: Mouth Opening: Normal Mallampati: I      Dental:  Dental Findings: In tact   Chest/Lungs:  Chest/Lungs Findings: Normal Respiratory Rate     Heart/Vascular:  Heart Findings: Rate: Normal  Rhythm: Regular Rhythm        Mental Status:  Mental Status Findings:  Cooperative         Anesthesia Plan  Type of Anesthesia, risks & benefits discussed:  Anesthesia Type:  general  Patient's Preference:   Intra-op Monitoring Plan:   Intra-op Monitoring Plan Comments:   Post Op Pain Control Plan:   Post Op Pain Control Plan Comments:   Induction:   IV  Beta Blocker:  Patient is not currently on a Beta-Blocker (No further documentation required).       Informed Consent: Patient understands risks and agrees with Anesthesia plan.  Questions answered.   ASA Score: 1     Day of Surgery Review of History & Physical:    H&P update referred to the surgeon.         Ready For Surgery From Anesthesia Perspective.

## 2017-03-17 NOTE — TRANSFER OF CARE
"Anesthesia Transfer of Care Note    Patient: Donna Kumar    Procedure(s) Performed: Procedure(s) (LRB):  ENDOBRONCHIAL ULTRASOUND (EBUS) (N/A)  BRONCHOSCOPY (N/A)    Patient location: PACU    Anesthesia Type: general    Transport from OR: Transported from OR on 6-10 L/min O2 by face mask with adequate spontaneous ventilation    Post pain: adequate analgesia    Post assessment: no apparent anesthetic complications    Post vital signs: stable    Level of consciousness: sedated    Nausea/Vomiting: no nausea/vomiting    Complications: none          Last vitals:   Visit Vitals    BP (!) 82/46    Pulse 85    Temp 36.8 °C (98.3 °F) (Oral)    Resp 18    Ht 5' 3" (1.6 m)    Wt 61.2 kg (135 lb)    LMP 03/08/2017 (Exact Date)    SpO2 100%    Breastfeeding No    BMI 23.91 kg/m2     "

## 2017-03-17 NOTE — DISCHARGE SUMMARY
[facility]  Discharge Summary      Admit Date: 3/17/2017    Discharge Date and Time:  03/17/2017 11:19 AM    Attending Physician: Gloria Kim MD     Reason for Admission: Bronchoscopy for mediastinal lymphadenopathy    Procedures Performed: Procedure(s) (LRB):  ENDOBRONCHIAL ULTRASOUND (EBUS) (N/A)  BRONCHOSCOPY (N/A)    Hospital Course (synopsis of major diagnoses, care, treatment, and services provided during the course of the hospital stay): Bronchoscopy complete     Consults: none    Significant Diagnostic Studies: Bronchoscopy: see note    Final Diagnoses:    Principal Problem: Mediastinal adenopathy   Secondary Diagnoses:   Active Hospital Problems    Diagnosis  POA    *Mediastinal adenopathy [R59.0]  Yes      Resolved Hospital Problems    Diagnosis Date Resolved POA   No resolved problems to display.       Discharged Condition: good    Disposition: Home or Self Care    Follow Up/Patient Instructions:     Medications:  Reconciled Home Medications:   Current Discharge Medication List      CONTINUE these medications which have NOT CHANGED    Details   ACZONE 5 % topical gel Apply topically every morning.  Qty: 60 g, Refills: 3    Associated Diagnoses: Acne vulgaris      fluoxetine 10 MG Tab Take 1 tablet (10 mg total) by mouth once daily.  Qty: 30 tablet, Refills: 3    Associated Diagnoses: Obsessive-compulsive disorder, unspecified type      levoFLOXacin (LEVAQUIN) 750 MG tablet Take 1 tablet (750 mg total) by mouth once daily.  Qty: 10 tablet, Refills: 0    Associated Diagnoses: CAP (community acquired pneumonia)      norethindrone-ethinyl estradiol (OVCON) 0.4-35 mg-mcg per tablet Take 1 tablet by mouth once daily.  Qty: 30 tablet, Refills: 11    Associated Diagnoses: Encounter for contraceptive management, unspecified contraceptive encounter type      sulfacetamide sodium-sulfur (SULFACLEANSE 8-4) 8-4 % Susp Wash face qhs  Qty: 473 mL, Refills: 3    Associated Diagnoses: Acne vulgaris       tazarotene (TAZORAC) 0.05 % Crea cream Apply topically every evening. Apply thin film to face qhs after moisturizing.  Qty: 60 g, Refills: 1    Associated Diagnoses: Acne vulgaris      cyanocobalamin (VITAMIN B-12) 1000 MCG tablet Take 1 tablet (1,000 mcg total) by mouth once daily.  Qty: 90 tablet, Refills: 3    Associated Diagnoses: Vitamin B 12 deficiency      ferrous sulfate 325 mg (65 mg iron) Tab tablet Take 1 tablet (325 mg total) by mouth daily with breakfast.  Qty: 90 tablet, Refills: 0    Associated Diagnoses: Iron deficiency anemia due to chronic blood loss      ibuprofen (ADVIL,MOTRIN) 600 MG tablet Take 1 tab every 8 hours with food for 2 days and then every 8 hours as needed for pain.  Qty: 30 tablet, Refills: 0    Associated Diagnoses: Pleuritic chest pain             Discharge Procedure Orders  CMV ANTIGENEMIA     EB VIRUS DNA DETECTOR, QUALITATIVE   Standing Status: Future  Standing Exp. Date: 05/15/18     PRAMOD-BARR VIRUS ANTIBODY PANEL   Standing Status: Future  Standing Exp. Date: 05/15/18     Quantiferon Gold TB   Standing Status: Future  Standing Exp. Date: 05/15/18     Diet general     Diet general       Follow-up Information     Call in 1 week to follow up.        Call in 1 week to follow up.

## 2017-03-17 NOTE — PROGRESS NOTES
Radha Hurtado RN asked for help re-entering labs that were ordered incorrectly. Spoke to Da in lab at ext. [phone]. States CMV antigememia is no longer performed and has been replaced by lab 1332, cytomegalovirus DNA probe amplified. Order placed, Radha Hurtado RN notified.

## 2017-03-17 NOTE — IP AVS SNAPSHOT
[facility]  [address]  [facility] [address]  Phone: [phone]           Patient Discharge Instructions     Our goal is to set you up for success. This packet includes information on your condition, medications, and your home care. It will help you to care for yourself so you don't get sicker and need to go back to the hospital.     Please ask your nurse if you have any questions.        There are many details to remember when preparing to leave the hospital. Here is what you will need to do:    1. Take your medicine. If you are prescribed medications, review your Medication List in the following pages. You may have new medications to  at the pharmacy and others that you'll need to stop taking. Review the instructions for how and when to take your medications. Talk with your doctor or nurses if you are unsure of what to do.     2. Go to your follow-up appointments. Specific follow-up information is listed in the following pages. Your may be contacted by a transition nurse or clinical provider about future appointments. Be sure we have all of the phone numbers to reach you, if needed. Please contact your provider's office if you are unable to make an appointment.     3. Watch for warning signs. Your doctor or nurse will give you detailed warning signs to watch for and when to call for assistance. These instructions may also include educational information about your condition. If you experience any of warning signs to your health, call your doctor.               [facility]  Unless otherwise directed by your provider, please contact [facility], our nurse care line that is available for 24/7 assistance.     [phone] (toll-free)    Registered nurses in the [facility] provide clinical advisement, health education, appointment booking, and other advisory services.                    ** Verify the list of medication(s) below is accurate and up  to date. Carry this with you in case of emergency. If your medications have changed, please notify your healthcare provider.             Medication List      CONTINUE taking these medications        Additional Info                      ACZONE 5 % topical gel   Quantity:  60 g   Refills:  3   Generic drug:  dapsone    Instructions:  Apply topically every morning.     Begin Date    AM    Noon    PM    Bedtime       cyanocobalamin 1000 MCG tablet   Commonly known as:  VITAMIN B-12   Quantity:  90 tablet   Refills:  3   Dose:  1000 mcg    Instructions:  Take 1 tablet (1,000 mcg total) by mouth once daily.     Begin Date    AM    Noon    PM    Bedtime       ferrous sulfate 325 mg (65 mg iron) Tab tablet   Quantity:  90 tablet   Refills:  0   Dose:  325 mg    Instructions:  Take 1 tablet (325 mg total) by mouth daily with breakfast.     Begin Date    AM    Noon    PM    Bedtime       fluoxetine 10 MG Tab   Quantity:  30 tablet   Refills:  3   Dose:  10 mg    Instructions:  Take 1 tablet (10 mg total) by mouth once daily.     Begin Date    AM    Noon    PM    Bedtime       ibuprofen 600 MG tablet   Commonly known as:  ADVIL,MOTRIN   Quantity:  30 tablet   Refills:  0    Instructions:  Take 1 tab every 8 hours with food for 2 days and then every 8 hours as needed for pain.     Begin Date    AM    Noon    PM    Bedtime       levoFLOXacin 750 MG tablet   Commonly known as:  LEVAQUIN   Quantity:  10 tablet   Refills:  0   Dose:  750 mg    Instructions:  Take 1 tablet (750 mg total) by mouth once daily.     Begin Date    AM    Noon    PM    Bedtime       norethindrone-ethinyl estradiol 0.4-35 mg-mcg per tablet   Commonly known as:  OVCON   Quantity:  30 tablet   Refills:  11   Dose:  1 tablet    Instructions:  Take 1 tablet by mouth once daily.     Begin Date    AM    Noon    PM    Bedtime       sulfacetamide sodium-sulfur 8-4 % Susp   Commonly known as:  SULFACLEANSE 8-4   Quantity:  473 mL   Refills:  3    Instructions:  Wash  face qhs     Begin Date    AM    Noon    PM    Bedtime       tazarotene 0.05 % Crea cream   Commonly known as:  TAZORAC   Quantity:  60 g   Refills:  1    Instructions:  Apply topically every evening. Apply thin film to face qhs after moisturizing.     Begin Date    AM    Noon    PM    Bedtime                  Please bring to all follow up appointments:    1. A copy of your discharge instructions.  2. All medicines you are currently taking in their original bottles.  3. Identification and insurance card.    Please arrive 15 minutes ahead of scheduled appointment time.    Please call 24 hours in advance if you must reschedule your appointment and/or time.        Your Scheduled Appointments     Apr 13, 2017  3:20 PM CDT   Established Patient Visit with MD [facility] - Internal Medicine ([facility])    [address]  [facility] [address]   [phone]              Follow-up Information     Call in 1 week to follow up.        Call in 1 week to follow up.        Discharge Instructions     Future Orders    EB VIRUS DNA DETECTOR, QUALITATIVE     PRAMOD-BARR VIRUS ANTIBODY PANEL     Quantiferon Gold TB     Process Instructions:    Check Collection Manual.  Orders placed for this test can only have a scheduled collection time between Monday through Thursday at 12:00 noon.    CMV ANTIGENEMIA     Diet general     Questions:    Total calories:      Fat restriction, if any:      Protein restriction, if any:      Na restriction, if any:      Fluid restriction:      Additional restrictions:      Diet general     Questions:    Total calories:      Fat restriction, if any:      Protein restriction, if any:      Na restriction, if any:      Fluid restriction:      Additional restrictions:          Discharge Instructions         Flexible Bronchoscopy  A flexible bronchoscopy is an exam of the airways of your lungs. A thin, flexible instrument called a bronchoscope is used. It has a light  and small camera that allow the healthcare provider to view your airways.  Call your doctor if you have shortness of breath, a temperature above 101.0°F (38.3°C) for more than 24 hours, or bleeding from your nose or throat. If you have chest pain or severe shortness of breath, call right away.   Before your test    · Follow your healthcare provider's instructions carefully. If you dont, the exam may be canceled or need to be repeated.  · If you are taking blood-thinning medicine, ask your health care provider whether you should stop taking the medicine before this test.  · Have no food or drink for 6 to 12 hours before the test. Also, avoid smoking for 24 hours before the test.  · You will need to remove any dentures or bridgework.  · Right before the test, you will be given sedating medications to help you relax. The medication may be given intravenously (IV) into one of your veins. In addition, your nose and throat may be numbed with a special spray to help prevent gagging and coughing.  · If you are having this test as an outpatient, make sure you have an adult friend or family member to drive you home.  During your test  Bronchoscopy takes 45 to 60 minutes and includes the following steps:  · You may receive anesthesia so that you are unconscious or asleep during the procedure.  · The healthcare provider inserts the tube into your nose or mouth.  · If you have not received anesthesia, you might feel a gagging sensation. To help relieve this feeling, you will be told to swallow or take deep breaths. Your airway will remain open even with the tube in place. But you wont be able to talk.  · The provider examines your breathing passages. He or she may also remove tiny tissue samples for biopsy.  After your test  · You may have a mild sore throat. Your voice may also be hoarse. And, you may have a cough.  · Do not eat or drink until the anesthesia wears off.  · If you had a biopsy, avoid coughing hard and clearing  "your throat.  · Call your healthcare provider if you have:  ¨ Shortness of breath  ¨ Chest pain  ¨ Bleeding from your nose or throat  ¨ A fever  Date Last Reviewed: 7/24/2014  © 5612-3717 [employer]. [address] [place] [address], [place] [address]. All rights reserved. This information is not intended as a substitute for professional medical care. Always follow your healthcare professional's instructions.            Primary Diagnosis     Your primary diagnosis was:  Mediastinal Disease      Admission Information     Date & Time Provider Department CSN    3/17/2017  7:02 AM Gloria Kim MD [facility] [id number]      Care Providers     Provider Role Specialty Primary office phone    Gloria Kim MD Attending Provider Intensive Care [phone]    Gloria Kim MD Surgeon  Intensive Care [phone]      Your Vitals Were     BP Pulse Temp Resp Height Weight    102/68 (BP Location: Right arm, Patient Position: Lying, BP Method: Automatic) 86 98 °F (36.7 °C) (Temporal) 21 5' 3" (1.6 m) 61.2 kg (135 lb)    Last Period SpO2 BMI          03/08/2017 (Exact Date) 97% 23.91 kg/m2        Recent Lab Values     No lab values to display.      Pending Labs     Order Current Status    Specimen to Pathology - Surgery Collected (03/17/17 1007)    AFB Culture & Smear In process    Aerobic culture In process    CMV DNA, quantitative, PCR In process    Culture, Anaerobe In process    Cytomegalovirus (Cmv) Ab, Igm In process    Cytomegalovirus DNA probe, amplified In process    Cytomegalovirus DNA probe, amplified In process    EB Virus DNA Detector, Qualitative In process    EB Virus DNA Detector, Qualitative In process    Eb Virus DNA Detector, Qualitative In process    Donny-Barr Virus antibody panel In process    Donny-Barr Virus antibody panel In process    Donny-Barr Virus antibody panel In process    Donny-Barr virus DNA, quantitative In process    Fungus culture In process    " Leukemia/Lymphoma Screen - Body Fluid Other (Specify) (lymph node) In process    Quantiferon Gold TB In process    Specimen to Pathology - Surgery In process    Toxoplasma Gondii IgG In process    Toxoplasma Gondii, DNA (Qual) In process    Culture, Respiratory with Gram Stain Preliminary result      Allergies as of 3/17/2017        Reactions    No Known Drug Allergies       Advance Directives     An advance directive is a document which, in the event you are no longer able to make decisions for yourself, tells your healthcare team what kind of treatment you do or do not want to receive, or who you would like to make those decisions for you.  If you do not currently have an advance directive, [facility] encourages you to create one.  For more information call:  [phone] ([phone]),  or log on to [email or web address].        Language Assistance Services     ATTENTION: Language assistance services are available, free of charge. Please call [phone].      ATENCIÓN: Si habla español, tiene a hebert disposición servicios gratuitos de asistencia lingüística. Llame al [phone].     [facility] Ý: N?u b?n nói Ti?ng Vi?t, có các d?ch v? h? tr? ngôn ng? mi?n phí dành cho b?n. G?i s? [phone].         [facility] complies with applicable Federal civil rights laws and does not discriminate on the basis of race, color, national origin, age, disability, or sex.

## 2017-03-18 LAB
CMV DNA SPEC QL NAA+PROBE: NOT DETECTED
CMV IGM TITR SERPL: <8 U/ML
SPECIMEN SOURCE: NORMAL

## 2017-03-20 ENCOUNTER — PATIENT MESSAGE (OUTPATIENT)
Dept: OBSTETRICS AND GYNECOLOGY | Facility: CLINIC | Age: 22
End: 2017-03-20

## 2017-03-20 LAB
BACTERIA SPEC AEROBE CULT: NO GROWTH
BACTERIA SPEC AEROBE CULT: NORMAL
CYTOMEGALOVIRUS DNA: NOT DETECTED
CYTOMEGALOVIRUS LOG (IU/ML): <2.4 LOG (10) COPIES/ML
CYTOMEGALOVIRUS PCR, QUANT: <250 COPIES/ML
CYTOMEGALOVIRUS SOURCE: NORMAL
EBV DNA # BLD NAA+PROBE: NORMAL {COPIES}/ML
EBV DNA BY PCR: NORMAL
GRAM STN SPEC: NORMAL
LOWER 95% CONFIDENCE INTERVAL: NORMAL
UPPER 95% CONFIDENCE INTERVAL: NORMAL

## 2017-03-21 LAB
BACTERIA SPEC ANAEROBE CULT: NORMAL
EBV DNA SPEC QL NAA+PROBE: NOT DETECTED
T GONDII DNA SPEC QL NAA+PROBE: NOT DETECTED

## 2017-03-22 LAB
EBV EA IGG SER QL IF: ABNORMAL TITER
EBV NA IGG SER IA-ACNC: ABNORMAL TITER
EBV VCA IGG SER IA-ACNC: ABNORMAL TITER
EBV VCA IGM SER IA-ACNC: ABNORMAL TITER
T GONDII IGG SER QL IA: NORMAL
T GONDII IGG SERPL IA-ACNC: <5 IU/ML

## 2017-03-23 ENCOUNTER — TELEPHONE (OUTPATIENT)
Dept: PULMONOLOGY | Facility: CLINIC | Age: 22
End: 2017-03-23

## 2017-03-24 ENCOUNTER — TELEPHONE (OUTPATIENT)
Dept: PULMONOLOGY | Facility: CLINIC | Age: 22
End: 2017-03-24

## 2017-03-24 DIAGNOSIS — R59.0 MEDIASTINAL LYMPHADENOPATHY: Primary | ICD-10-CM

## 2017-03-24 NOTE — TELEPHONE ENCOUNTER
----- Message from Ema Monroe sent at 3/24/2017  3:23 PM CDT -----  Contact: Donna   [phone]  Had testing last week,  Wants the test results.  Pls call.

## 2017-03-26 ENCOUNTER — PATIENT MESSAGE (OUTPATIENT)
Dept: DERMATOLOGY | Facility: CLINIC | Age: 22
End: 2017-03-26

## 2017-03-27 NOTE — TELEPHONE ENCOUNTER
Symptoms are resolving.  Will need to get additional labs including urinary histo antigen, aspergillus antigen, quanteferon gold On THursday April 13 in am.  CT of chest also that day with clinic appt at [facility]

## 2017-03-31 ENCOUNTER — PATIENT MESSAGE (OUTPATIENT)
Dept: PULMONOLOGY | Facility: CLINIC | Age: 22
End: 2017-03-31

## 2017-04-04 ENCOUNTER — TELEPHONE (OUTPATIENT)
Dept: PULMONOLOGY | Facility: CLINIC | Age: 22
End: 2017-04-04

## 2017-04-04 DIAGNOSIS — R16.1 SPLEEN ENLARGED: ICD-10-CM

## 2017-04-04 DIAGNOSIS — R59.0 MEDIASTINAL ADENOPATHY: Primary | ICD-10-CM

## 2017-04-04 DIAGNOSIS — K76.9 LIVER DISORDER: ICD-10-CM

## 2017-04-12 ENCOUNTER — PATIENT MESSAGE (OUTPATIENT)
Dept: PULMONOLOGY | Facility: HOSPITAL | Age: 22
End: 2017-04-12

## 2017-04-12 DIAGNOSIS — R91.8 ABNORMAL CT SCAN, LUNG: Primary | ICD-10-CM

## 2017-04-13 ENCOUNTER — OFFICE VISIT (OUTPATIENT)
Dept: INFECTIOUS DISEASES | Facility: CLINIC | Age: 22
End: 2017-04-13
Payer: COMMERCIAL

## 2017-04-13 ENCOUNTER — OFFICE VISIT (OUTPATIENT)
Dept: PULMONOLOGY | Facility: CLINIC | Age: 22
End: 2017-04-13
Payer: COMMERCIAL

## 2017-04-13 ENCOUNTER — HOSPITAL ENCOUNTER (OUTPATIENT)
Dept: RADIOLOGY | Facility: HOSPITAL | Age: 22
Discharge: HOME | End: 2017-04-13
Attending: INTERNAL MEDICINE
Payer: COMMERCIAL

## 2017-04-13 VITALS
DIASTOLIC BLOOD PRESSURE: 77 MMHG | WEIGHT: 137.13 LBS | SYSTOLIC BLOOD PRESSURE: 125 MMHG | TEMPERATURE: 98 F | HEIGHT: 63 IN | BODY MASS INDEX: 24.3 KG/M2 | HEART RATE: 92 BPM

## 2017-04-13 VITALS
HEIGHT: 63 IN | DIASTOLIC BLOOD PRESSURE: 70 MMHG | BODY MASS INDEX: 24.57 KG/M2 | OXYGEN SATURATION: 99 % | SYSTOLIC BLOOD PRESSURE: 108 MMHG | HEART RATE: 89 BPM | WEIGHT: 138.69 LBS

## 2017-04-13 DIAGNOSIS — R59.0 MEDIASTINAL LYMPHADENOPATHY: ICD-10-CM

## 2017-04-13 DIAGNOSIS — R59.0 MEDIASTINAL ADENOPATHY: Primary | ICD-10-CM

## 2017-04-13 DIAGNOSIS — R59.1 LYMPHADENOPATHY: Primary | ICD-10-CM

## 2017-04-13 DIAGNOSIS — R16.1 SPLEEN ENLARGED: ICD-10-CM

## 2017-04-13 DIAGNOSIS — R93.89 ABNORMAL CT OF THE CHEST: Primary | ICD-10-CM

## 2017-04-13 DIAGNOSIS — R59.0 MEDIASTINAL ADENOPATHY: ICD-10-CM

## 2017-04-13 DIAGNOSIS — K76.9 LIVER DISORDER: ICD-10-CM

## 2017-04-13 LAB
CREAT SERPL-MCNC: 0.8 MG/DL (ref 0.5–1.4)
SAMPLE: NORMAL

## 2017-04-13 PROCEDURE — 99214 OFFICE O/P EST MOD 30 MIN: CPT | Mod: S$GLB,,, | Performed by: INTERNAL MEDICINE

## 2017-04-13 PROCEDURE — 74170 CT ABD WO CNTRST FLWD CNTRST: CPT | Mod: 26,,, | Performed by: RADIOLOGY

## 2017-04-13 PROCEDURE — 74170 CT ABD WO CNTRST FLWD CNTRST: CPT | Mod: TC

## 2017-04-13 PROCEDURE — 99999 PR PBB SHADOW E&M-EST. PATIENT-LVL III: CPT | Mod: PBBFAC,,, | Performed by: INTERNAL MEDICINE

## 2017-04-13 PROCEDURE — 25500020 PHARM REV CODE 255: Performed by: INTERNAL MEDICINE

## 2017-04-13 PROCEDURE — 71260 CT THORAX DX C+: CPT | Mod: 26,,, | Performed by: RADIOLOGY

## 2017-04-13 PROCEDURE — 99204 OFFICE O/P NEW MOD 45 MIN: CPT | Mod: S$GLB,,, | Performed by: INTERNAL MEDICINE

## 2017-04-13 PROCEDURE — 71260 CT THORAX DX C+: CPT | Mod: TC

## 2017-04-13 RX ADMIN — IOHEXOL 75 ML: 350 INJECTION, SOLUTION INTRAVENOUS at 02:04

## 2017-04-13 RX ADMIN — IOHEXOL 15 ML: 350 INJECTION, SOLUTION INTRAVENOUS at 02:04

## 2017-04-13 NOTE — PROGRESS NOTES
Subjective:      Patient ID: Donna Kumar is a 21 y.o. female.    Chief Complaint:Fatigue      History of Present Illness    21 year old with drenching night sweats dating back to late 11/2016; around Thanksgiving.  Had URI symptoms around early February and was given a Z-pack/no steroids.  3/9/2017: chest pain on deep breathing; subjective fever. Given levaquin Rx.    Impression - chest x-ray 3/9   Asymmetrical chronic granulomatous change right hilum.  Old chest radiographs for comparison, consideration for repeat chest exam post therapy versus CT scan of chest with IV contrast as clinically indicated, suggested as well as clinical correlation.     Impression - CT scan 3/9      1.  Large right hilar and subcarinal necrotic lymph node enlargement with associated mild consolidation with air bronchogram in the right middle lobe as described above.  Given the patient's age, this is favored to be related to infectious process with reactive lymph node enlargement.  However, given the large size of subcarinal lymph node, a neoplastic process cannot be excluded.  Correlation and followup to resolution are advised.  Further evaluation as clinically indicated.    2.  Vague area of hyper enhancement in segment 7 of the liver which could relate to FNH or adenoma.  Further evaluation with liver mass protocol MRI with Eovist is recommended.    3.  Nonspecific splenic lesions which could be further characterized the time of MRI as well.     4.  Narrowing of the bronchus intermedius from mass effect of subcarinal lymph node as above.     Symptoms overall are better; she has more energy.  Still with night sweats.   She is sexually active; only one partner and protected.   No previousl STD's.    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 No  pets.    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    Component      Latest Ref Rng & Units 3/16/2017 3/9/2017 7/20/2016   Hemoglobin      12.0 - 16.0 g/dL 11.9 (L) 11.1 (L) 13.0   Hematocrit      37.0 - 48.5 % 36.5 (L) 34.2 (L) 40.9     Component      Latest Ref Rng & Units 3/16/2017 3/9/2017 7/20/2016   Gran%      38.0 - 73.0 % 79.8 (H) 76.8 (H) 69.7   Lymph%      18.0 - 48.0 % 12.6 (L) 14.6 (L) 23.4     Component      Latest Ref Rng & Units 3/16/2017   Iron      30 - 160 ug/dL 33   Transferrin      200 - 375 mg/dL 365   TIBC      250 - 450 ug/dL 540 (H)   Saturated Iron      20 - 50 % 6 (L)     Component      Latest Ref Rng & Units 3/16/2017   Vitamin B-12      210 - 950 pg/mL 165 (L)     Component      Latest Ref Rng & Units 3/17/2017   EBV VCA IgG      <1:10 Titer 1:160 (A)   EBV VCA IgM      <1:10 Titer <1:10   EBV Early Antigen Ab, IgG      <1:10 Titer 1:10 (A)   EBV Nuclear Ag Ab      <1:5 Titer 1:20 (A)     Path:    Review of Systems   Constitution: Positive for night sweats. Negative for chills, decreased appetite, fever, weakness, malaise/fatigue, weight gain and weight loss.   HENT: Positive for sore throat. Negative for congestion, ear pain, headaches, hearing loss, hoarse voice and tinnitus.    Eyes: Negative for blurred vision, redness and visual disturbance.   Cardiovascular: Negative for chest pain, leg swelling and palpitations.   Respiratory: Positive for cough. Negative for hemoptysis, shortness of breath and sputum production.    Hematologic/Lymphatic: Negative for adenopathy. Does not bruise/bleed easily.   Skin: Negative for dry skin, itching, rash and suspicious lesions.   Musculoskeletal: Positive for myalgias. Negative for back pain, joint pain and neck pain.   Gastrointestinal: Positive for abdominal pain. Negative for constipation, diarrhea, heartburn, nausea and vomiting.   Genitourinary: Negative for dysuria, flank pain, frequency,  hematuria, hesitancy and urgency.   Neurological: Negative for dizziness, numbness and paresthesias.   Psychiatric/Behavioral: Negative for depression and memory loss. The patient is nervous/anxious. The patient does not have insomnia.      Objective:   Physical Exam   Constitutional: She is oriented to person, place, and time. She appears well-developed and well-nourished.   HENT:   Head: Normocephalic and atraumatic.   Right Ear: External ear normal.   Left Ear: External ear normal.   Nose: Nose normal.   Mouth/Throat: Oropharynx is clear and moist.   Eyes: Conjunctivae and EOM are normal. Pupils are equal, round, and reactive to light.   Neck: Normal range of motion. Neck supple. No thyromegaly present.   Cardiovascular: Normal rate, regular rhythm, normal heart sounds and intact distal pulses.    No murmur heard.  Pulmonary/Chest: Effort normal and breath sounds normal. No respiratory distress. She has no wheezes. She has no rales.   Abdominal: Soft. Bowel sounds are normal. There is no tenderness.   Musculoskeletal: Normal range of motion.   Lymphadenopathy:     She has no cervical adenopathy.   Neurological: She is alert and oriented to person, place, and time.   Skin: Skin is warm and dry.   Psychiatric: She has a normal mood and affect. Her behavior is normal. Judgment normal.     Assessment:       1. Lymphadenopathy        Reactive most likely rather than malignancy.  TB vs other mycobacterial vs fungal.       Plan:       1. Repeat CT/abdomen.  2. Will discuss results once available.  3. Will get PCR TB on tissue.  4. Will discuss further once results available.

## 2017-04-13 NOTE — LETTER
April 16, 2017      Gloria Kim MD  [address]  [facility] [address]           [facility]  [address]  [facility] [address]  Phone: [phone]  Fax: [phone]          Patient: Donna Kumar   MR Number: [id number]   YOB: 1995   Date of Visit: 4/13/2017       Dear Dr. Gloria Kim:    Thank you for referring Donna Kumar to me for evaluation. Attached you will find relevant portions of my assessment and plan of care.    If you have questions, please do not hesitate to call me. I look forward to following Donna Kumar along with you.    Sincerely,    Camila Lynn MD    Enclosure  CC:  No Recipients    If you would like to receive this communication electronically, please contact [email or web address] or [phone] to request more information on [employer] access.    For providers and/or their staff who would like to refer a patient to [facility], please contact us through our one-stop-shop provider referral line, [facility], at [phone].    If you feel you have received this communication in error or would no longer like to receive these types of communications, please e-mail [email or web address]

## 2017-04-13 NOTE — MR AVS SNAPSHOT
[facility] - Infectious Diseases  [address] [facility] [place] [address]  Phone: [phone]  Fax: [phone]                  Donna Kumar   2017 10:30 AM   Office Visit    Description:  Female : 1995   Provider:  Camila Lynn MD   Department:  [facility]           Reason for Visit     swelling in chest           Diagnoses this Visit        Comments    Lymphadenopathy    -  Primary            To Do List           Future Appointments        Provider Department Dept Phone    2017 11:50 AM LAB, APPOINTMENT [place] [facility] [phone]    2017 12:30 PM [facility] CT2 64- LIMIT 600 LBS [facility] [phone]    2017 2:00 PM Gloria Kim MD [facility] - Pulmonary Services [phone]    2017 3:20 PM Brooklynn Boyle MD [facility] - Internal Medicine [phone]      Goals (5 Years of Data)     None      [facility]     [facility] Nurse Care Line -  Assistance  Unless otherwise directed by your provider, please contact [facility], our nurse care line that is available for  assistance.     Registered nurses in the [facility] Center provide: appointment scheduling, clinical advisement, health education, and other advisory services.  Call: [phone] (toll free)               Medications           Message regarding Medications     Verify the changes and/or additions to your medication regime listed below are the same as discussed with your clinician today.  If any of these changes or additions are incorrect, please notify your healthcare provider.             Verify that the below list of medications is an accurate representation of the medications you are currently taking.  If none reported, the list may be blank. If incorrect, please contact your healthcare provider. Carry this list with you in case of emergency.           Current Medications     ACZONE 5 % topical gel Apply  "topically every morning.    cyanocobalamin (VITAMIN B-12) 1000 MCG tablet Take 1 tablet (1,000 mcg total) by mouth once daily.    ferrous sulfate 325 mg (65 mg iron) Tab tablet Take 1 tablet (325 mg total) by mouth daily with breakfast.    fluoxetine 10 MG Tab Take 1 tablet (10 mg total) by mouth once daily.    ibuprofen (ADVIL,MOTRIN) 600 MG tablet Take 1 tab every 8 hours with food for 2 days and then every 8 hours as needed for pain.    norethindrone-ethinyl estradiol (OVCON) 0.4-35 mg-mcg per tablet Take 1 tablet by mouth once daily.    sulfacetamide sodium-sulfur (SULFACLEANSE 8-4) 8-4 % Susp Wash face qhs    tazarotene (TAZORAC) 0.05 % Crea cream Apply topically every evening. Apply thin film to face qhs after moisturizing.           Clinical Reference Information           Your Vitals Were     BP Pulse Temp Height Weight Last Period    125/77 (BP Location: Left arm) 92 98.4 °F (36.9 °C) (Oral) 5' 3" (1.6 m) 62.2 kg (137 lb 2 oz) 03/08/2017 (Exact Date)    BMI                24.29 kg/m2          Blood Pressure          Most Recent Value    BP  125/77      Allergies as of 4/13/2017     No Known Drug Allergies      Immunizations Administered on Date of Encounter - 4/13/2017     None      Orders Placed During Today's Visit     Future Labs/Procedures Expected by Expires    Blastomyces Antigen, Fluid Urine  4/13/2017 6/12/2018    CK  4/13/2017 6/12/2018    Cryptococcal antigen  4/13/2017 4/13/2018    Iron and TIBC  4/13/2017 (Approximate) 4/13/2018    T-SPOT TB Screening Test  4/13/2017 6/12/2018    TRYPTASE  4/13/2017 6/12/2018    Vitamin B12  4/13/2017 6/12/2018      Language Assistance Services     ATTENTION: Language assistance services are available, free of charge. Please call [phone].      ATENCIÓN: Si habla naheed, tiene a hebert disposición servicios gratuitos de asistencia lingüística. Llame al [phone].     CHÚ Ý: N?u b?n nói Ti?ng Vi?t, có các d?ch v? h? tr? ngôn ng? mi?n phí dành cho b?n. G?i " s? [phone].         [facility] complies with applicable Federal civil rights laws and does not discriminate on the basis of race, color, national origin, age, disability, or sex.

## 2017-04-13 NOTE — PROGRESS NOTES
"Subjective:       Patient ID: Donna Kumar is a 21 y.o. female.    Chief Complaint: Mediastinal Adenopathy    HPI Comments: Follow up for mediastinal adenopathy.  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  Symptoms progressed to fatigue, shortness of breath and dysphagia.  Patient had endobronchial biopsies of fungating lesion and EBUS FNA which shows acute and chronic granulomatous inflammation.  Patient denies sinus symptoms, fevers or renal abnormalities.  She is feeling 75% better and has begun to run for 15 minutes.      Review of Systems   HENT: Negative for trouble swallowing.    Respiratory: Negative for cough, wheezing and pleurisy.    Cardiovascular: Negative for chest pain and leg swelling.   Gastrointestinal: Negative for acid reflux.       Objective:       Vitals:    04/13/17 1449   BP: 108/70   Pulse: 89   SpO2: 99%   Weight: 62.9 kg (138 lb 10.7 oz)   Height: 5' 3" (1.6 m)     Physical Exam   Constitutional: She appears well-developed and well-nourished.   HENT:   Left cervical lymph node mobile approximately 5 mm in size.  nontender   Neck: Normal range of motion. Neck supple.   Cardiovascular: Normal rate and regular rhythm.    Pulmonary/Chest: She has no wheezes. She has no rales.   Musculoskeletal: Normal range of motion. She exhibits no edema.   Neurological: No cranial nerve deficit.   Psychiatric: She has a normal mood and affect.     Personal Diagnostic Review  CT of chest performed on today as compared to march with contrast revealed more patent right mainstem bronchus with less of an endobronchial componenet.  Persistent lymphadenopathy of right paratracheal, right hilar and subcarina..  No flowsheet data found.     Histo, crypto, Tspot test are pending  ANCA, AYAN and RF pending    CRP and ESR extremely high.      Assessment:       1. Mediastinal adenopathy        Outpatient Encounter Prescriptions as of 4/13/2017   Medication " Sig Dispense Refill    ACZONE 5 % topical gel Apply topically every morning. 60 g 3    cyanocobalamin (VITAMIN B-12) 1000 MCG tablet Take 1 tablet (1,000 mcg total) by mouth once daily. 90 tablet 3    ferrous sulfate 325 mg (65 mg iron) Tab tablet Take 1 tablet (325 mg total) by mouth daily with breakfast. 90 tablet 0    fluoxetine 10 MG Tab Take 1 tablet (10 mg total) by mouth once daily. 30 tablet 3    norethindrone-ethinyl estradiol (OVCON) 0.4-35 mg-mcg per tablet Take 1 tablet by mouth once daily. 30 tablet 11    sulfacetamide sodium-sulfur (SULFACLEANSE 8-4) 8-4 % Susp Wash face qhs 473 mL 3    tazarotene (TAZORAC) 0.05 % Crea cream Apply topically every evening. Apply thin film to face qhs after moisturizing. 60 g 1    [DISCONTINUED] ibuprofen (ADVIL,MOTRIN) 600 MG tablet Take 1 tab every 8 hours with food for 2 days and then every 8 hours as needed for pain. 30 tablet 0     Facility-Administered Encounter Medications as of 4/13/2017   Medication Dose Route Frequency Provider Last Rate Last Dose    [COMPLETED] omnipaque 350 iohexol 15 mL  15 mL Oral ONCE PRN Gloria Kim MD   15 mL at 04/13/17 142    [COMPLETED] omnipaque 350 iohexol 75 mL  75 mL Intravenous ONCE PRN Gloria Kim MD   75 mL at 04/13/17 1422     No orders of the defined types were placed in this encounter.    Plan:       Clinically improved but persistent lymphadenopathy with biopsy c/w granulomatous inflammation.  Await fungal and mycobacterial serology.  If all is negative, consider trial of corticosteroids.    Discussed with chest radiographer, Dr. Gallo, and Infectious Disease, Dr. Lynn regarding case who agree.

## 2017-04-18 ENCOUNTER — TELEPHONE (OUTPATIENT)
Dept: RHEUMATOLOGY | Facility: CLINIC | Age: 22
End: 2017-04-18

## 2017-04-18 ENCOUNTER — PATIENT MESSAGE (OUTPATIENT)
Dept: INFECTIOUS DISEASES | Facility: CLINIC | Age: 22
End: 2017-04-18

## 2017-04-19 ENCOUNTER — PATIENT MESSAGE (OUTPATIENT)
Dept: PULMONOLOGY | Facility: CLINIC | Age: 22
End: 2017-04-19

## 2017-04-19 LAB — FUNGUS SPEC CULT: NORMAL

## 2017-04-20 ENCOUNTER — TELEPHONE (OUTPATIENT)
Dept: PULMONOLOGY | Facility: CLINIC | Age: 22
End: 2017-04-20

## 2017-04-20 DIAGNOSIS — R59.0 MEDIASTINAL ADENOPATHY: Primary | ICD-10-CM

## 2017-04-20 RX ORDER — PREDNISONE 20 MG/1
TABLET ORAL
Qty: 50 TABLET | Refills: 1 | Status: SHIPPED | OUTPATIENT
Start: 2017-04-20 | End: 2018-03-17

## 2017-04-20 NOTE — TELEPHONE ENCOUNTER
All cultures and serologic studies are negative for infectious etiology.  Will start systemic steroids for granulomatous inflammation as patient's symptoms are returning.  Advised that she needs to go to ED if feels worse.  Patient to see me when she returns from school in May.  She will call with dates.  I have also advised her that if there is no resolution she will need to stay in [place] and have a surgical biopsy/mediastinoscopy.

## 2017-04-27 ENCOUNTER — TELEPHONE (OUTPATIENT)
Dept: PULMONOLOGY | Facility: CLINIC | Age: 22
End: 2017-04-27

## 2017-04-27 DIAGNOSIS — R59.0 MEDIASTINAL ADENOPATHY: Primary | ICD-10-CM

## 2017-04-27 NOTE — TELEPHONE ENCOUNTER
Patient has been on steroids for approximately one week without symptomatic improvement.  Patietn reports difficulty swallowing still.  No fevers.  Will call to schedule appt with thoracic surgery for mediastinoscopy.

## 2017-04-28 ENCOUNTER — TELEPHONE (OUTPATIENT)
Dept: CARDIOTHORACIC SURGERY | Facility: CLINIC | Age: 22
End: 2017-04-28

## 2017-04-28 DIAGNOSIS — R59.0 MEDIASTINAL LYMPHADENOPATHY: Primary | ICD-10-CM

## 2017-04-28 NOTE — TELEPHONE ENCOUNTER
"Received a referral from Dr. Kim re: mediastinal lymphadenopathy s/p EBUS demonstrating chronic granulomatous inflammation. Requesting an evaluation for mediastinoscopy for further pathology confirmation.    Pt had a CT chest on 3/9/17 noting "Large right hilar and subcarinal necrotic lymph node enlargement with associated mild consolidation with air bronchogram in the right middle lobe ".   Another CT chest/abd on 4/13/17 "There is again evidence of mediastinal and right hilar lymphadenopathy, which appears minimally larger in size.  There are also scattered clusters of pulmonary micronodules including a 0.8 cm pulmonary nodule in the right lower lobes.  Given the patient's age this finding may reflect an infectious or inflammatory process, however, an underlying neoplasm, such as lymphoma, is not excluded."    EBUS performed on 3/17/17. Pt placed on antibiotics and steroids, she will complete steroids on 5/5.   Discussed with Dr. Kaur, he requested a CT chest with contrast after completion of steroids.     Called the pt, she requested that I speak with her mother as she is in college in [place], [place]. Pt's mom would like for the mediastinoscopy to be scheduled following Dr. Kaur's consultation. Dr. Kaur ok'ed scheduling the mediastinoscopy for 5/11 following his consult on 5/10. Pt will have CT chest with contrast on 5/9 at 345p, agrees to fast 4 hours prior. Advised pt's mom that she may get a call from pre-op/anesthesia since the case is booked for 5/11. We can cancel the procedure if Dr. Kaur does not deem this necessary after the repeated CT chest on 5/9. She voices understanding, appt slips mailed to her home address.  "

## 2017-05-02 ENCOUNTER — ANESTHESIA EVENT (OUTPATIENT)
Dept: SURGERY | Facility: HOSPITAL | Age: 22
End: 2017-05-02
Payer: COMMERCIAL

## 2017-05-02 NOTE — ANESTHESIA PREPROCEDURE EVALUATION
Anesthesia Assessment: Preoperative EQUATION    Planned Procedure: Procedure(s) (LRB):  MEDIASTINOSCOPY (N/A)  Requested Anesthesia Type:General  Surgeon: Moustapha Kaur MD  Service: Thoracic  Known or anticipated Date of Surgery:5/11/2017    Surgeon notes: reviewed and Mediastinal lymphadenopathy    Electronic QUestionnaire Assessment completed via nurse interview with patient.        Donna Kumar [[id number]] - 21 y.o. Female        Providers Outside of [facility]      No data to display       Surgical Risk Level      Surgical Risk Level:  2           caRDScore (Clinical Anesthesia Rapid Decision Score)        Very Very Low  Total Score: 5      5 Sum of Clinical Scores       caRDScores (Grouped)      caRDScore - Ane:  4                caRDScore - CVD:  0                caRDScore - Pul:  0                caRDScore - Met:  1                caRDScore - Phy:  0           caRDScore Items           [facility]admit from 5[facility]11[facility] in [facility]     Anesthesia      Oral or IM for chronic condition (steroid dependent- Why in comment)  Yes [inflammation-on steriod for 1.5 wks at present]     Has trouble swallowing / handling saliva  Yes [mild-sometimes]     CVD      Activity similar to best ability for maximal activity or exercise  METS 4     Pulmonary      Metabolic      Has / Has had Dx of schizophrenia, OCD, Other disease Rxed by psychiatrist  Yes     Physiologic        Flags      Red Flag Score:  0                Yellow Flag Score:  4           Red Flags      No data to display       Yellow Flags           Pre-admit from 5/11/2017 in [facility]     Has had surgery within the last 3 months  Yes     Has had steroids in the last year  Yes     Takes herbal medications or vitamin supplements  Yes [will stop x7 days prior to surgery]     Has / Has had Dx of schizophrenia, OCD, Other disease Rxed by psychiatrist  Yes       PONV Risk Score (assumes periop narcotic use = +1, Max=4)       PONV Risk Score:  3           PONV Risk Factors  Total Score: 2      1 Female     1 Non-Smoker at present       Sleep Apnea  Total Score: 0        KRISTIAN STOP-Bang Risk Factors (Max=8)  Total Score: 0        KRISTIAN Risk Level - 1 (Low), 2 (Moderate), 3 (High)      KRISTIAN Risk Level:  0           RCRI (Revised Cardiac Risk Indices of ACC/AHA guidelines, Max=6)  Total Score: 1      1 Patient is having an intra-abdominal thoracic or major vascular case       CAD Risk Factors  Total Score: 1      1 Exercise on a routine basis       CHADS Score if applicable (history of atrial fib/flutter, Max=6)  Total Score: 0        Maximal Exercise Capacity           Pre-admit from 5/11/2017 in [facility]     Maximal Exercise Capacity  METS 4       Summary of Dependence  Total Score: 1      1 Is totally independent of others for activities of daily living       Phone Fraility Score (Max = 17)  Total Score: 1      1 Uses 5 or more meds on reg basis       Pain Factors      No data to display       Risk Triggers (Evidence-Based Risk Triggers)        Pulmonary Risk Triggers  Total Score: 1      1 Has trouble swallowing / handling saliva       Renal Risk Triggers  Total Score: 0        Delirium Risk Triggers  Total Score: 0        Urologic Risk Triggers  Total Score: 0        Logistics        Pre-op Clinic Logistics  Total Score: 6      1 Has had surgery within the last 3 months     1 Oral or IM for chronic condition (steroid dependent)     2 Takes herbal medications or vitamin supplements     1 Has had anesthesia, either as adult or as a child     1 Has / Has had Dx of schizophrenia, OCD, Other disease Rxed by psychiatrist       DOSC Logistics  Total Score: 3      1 Oral or IM for chronic condition (steroid dependent- Why in comment)     2 Has trouble swallowing / handling saliva       Discharge Logistics  Total Score: 1      1 Has trouble swallowing / handling saliva       Discharge Planning           Pre-admit from 5/11/2017  in [facility]     Discharge Planning      Will assist patient 24/7, if needed  -- [parents]       Fast Track <For office use only>      Total Score: 7        Surgical Risk Level Assessment                 Triage considerations:     The patient has no apparent active cardiac condition (No unstable coronary Syndrome such as severe unstable angina or recent [<1 month] myocardial infarction, decompensated CHF, severe valvular   disease or significant arrhythmia)    Previous anesthesia records:3/17/17-Endobronchial Ultra Sound(EBUS)/Bronchoscopy-GeneralAirway/Jaw/Neck:  Airway Findings: Mouth Opening: Normal Mallampati: I -No PONV  -no apparent anesthetic complications     -Anesthesia Hx:  No previous Anesthesia     Last PCP note: within 3 months , within [facility] , Annual Exam  Subspecialty notes: Pulmonary, Dermatology/Infectious Disease/Obstetrics and Gynecology    Other important co-morbidities:   Diagnosis Date    Mastocytosis      Obsessive compulsive disorder         followed by Dr. Malone   Iron deficiency       Tests already available:  Results have been reviewed.Labs-4/13/17-Iron & TIBC/C-Reactive Protein/Sed Rate/AYAN/ 3/17/17-Donny-Barr Virus antibody/ 3/16/17-CBC/Vitamin B12/Iron& TIBC/ 3/9/17-CMP/CBC/  CXR-3/9/17            Instructions given. (See in Nurse's note)    Optimization:  Phone           Plan:    Testing:  None needed at this time      Patient  has previously scheduled Medical Appointment:Appointments on 5/9/17 & 5/10/17 prior to surgery.    Navigation:                            Straight Line to surgery.               No tests, anesthesia preop clinic visit, or consult required.                  Janis Esposito RN  5/2/17 05/02/2017  Donna C Kumar is a 21 y.o., female.  Patient Active Problem List   Diagnosis    Foot pain    Obsessive compulsive disorder    Iron  deficiency    B12 deficiency    Mediastinal adenopathy         Anesthesia Evaluation    I have reviewed the Patient Summary Reports.    I have reviewed the Nursing Notes.   I have reviewed the Medications.   Steroids Taken In Past Year:     Review of Systems  Anesthesia Hx:  No problems with previous Anesthesia  History of prior surgery of interest to airway management or planning: Previous anesthesia: General EBUS 3/17/17 with general anesthesia.  Procedure performed at an [facility] Facility. Denies Family Hx of Anesthesia complications.   Denies Personal Hx of Anesthesia complications.   Social:  Non-Smoker, No Alcohol Use    Cardiovascular:   Exercise tolerance: good Denies Valvular problems/Murmurs.   Denies Angina.    Pulmonary:   Denies Asthma.  Denies Shortness of breath.  Denies Recent URI. Mediastinal lymphadenopathy       Physical Exam  General:  Well nourished    Airway/Jaw/Neck:  Airway Findings: Mouth Opening: Normal Tongue: Normal  Mallampati: I  TM Distance: Normal, at least 6 cm  Jaw/Neck Findings:  Neck ROM: Normal ROM      Dental:  Dental Findings: lower retainer   Chest/Lungs:  Chest/Lungs Findings: Clear to auscultation     Heart/Vascular:  Heart Findings: Rate: Normal  Rhythm: Regular Rhythm  Sounds: Normal        Mental Status:  Mental Status Findings:  Cooperative, Alert and Oriented         Anesthesia Plan  Type of Anesthesia, risks & benefits discussed:  Anesthesia Type:  general  Patient's Preference: General  Intra-op Monitoring Plan: standard ASA monitors and arterial line  Intra-op Monitoring Plan Comments: Standard ASA monitors.   Post Op Pain Control Plan:   Post Op Pain Control Plan Comments: Per primary service.     Induction:   IV  Beta Blocker:  Patient is not currently on a Beta-Blocker (No further documentation required).       Informed Consent: Patient understands risks and agrees with Anesthesia plan.  Questions answered. Anesthesia consent signed with patient.  ASA Score: 3      Day of Surgery Review of History & Physical:    H&P update referred to the surgeon.     Anesthesia Plan Notes: Chart reviewed, patient interviewed and examined.  The plan for general anesthesia was explained.  Questions were answered and the consent was signed.  Valerie Tee For Surgery From Anesthesia Perspective.

## 2017-05-02 NOTE — PRE ADMISSION SCREENING
Anesthesia Assessment: Preoperative EQUATION    Planned Procedure: Procedure(s) (LRB):  MEDIASTINOSCOPY (N/A)  Requested Anesthesia Type:General  Surgeon: Moustapha Kaur MD  Service: Thoracic  Known or anticipated Date of Surgery:5/11/2017    Surgeon notes: reviewed and Mediastinal lymphadenopathy    Electronic QUestionnaire Assessment completed via nurse interview with patient.        Donna Kumar [[id number]] - 21 y.o. Female        Providers Outside of [facility]      No data to display       Surgical Risk Level      Surgical Risk Level:  2           caRDScore (Clinical Anesthesia Rapid Decision Score)        Very Very Low  Total Score: 5      5 Sum of Clinical Scores       caRDScores (Grouped)      caRDScore - Ane:  4                caRDScore - CVD:  0                caRDScore - Pul:  0                caRDScore - Met:  1                caRDScore - Phy:  0           caRDScore Items           Pre-admit from 5/11/2017 in [facility]     Anesthesia      Oral or IM for chronic condition (steroid dependent- Why in comment)  Yes [inflammation-on steriod for 1.5 wks at present]     Has trouble swallowing / handling saliva  Yes [mild-sometimes]     CVD      Activity similar to best ability for maximal activity or exercise  METS 4     Pulmonary      Metabolic      Has / Has had Dx of schizophrenia, OCD, Other disease Rxed by psychiatrist  Yes     Physiologic        Flags      Red Flag Score:  0                Yellow Flag Score:  4           Red Flags      No data to display       Yellow Flags           Pre-admit from 5/11/2017 in [facility]     Has had surgery within the last 3 months  Yes     Has had steroids in the last year  Yes     Takes herbal medications or vitamin supplements  Yes [will stop x7 days prior to surgery]     Has / Has had Dx of schizophrenia, OCD, Other disease Rxed by psychiatrist  Yes       PONV Risk Score (assumes periop narcotic use = +1, Max=4)      PONV  Risk Score:  3           PONV Risk Factors  Total Score: 2      1 Female     1 Non-Smoker at present       Sleep Apnea  Total Score: 0        KRISTIAN STOP-Bang Risk Factors (Max=8)  Total Score: 0        KRISTIAN Risk Level - 1 (Low), 2 (Moderate), 3 (High)      KRISTIAN Risk Level:  0           RCRI (Revised Cardiac Risk Indices of ACC/AHA guidelines, Max=6)  Total Score: 1      1 Patient is having an intra-abdominal thoracic or major vascular case       CAD Risk Factors  Total Score: 1      1 Exercise on a routine basis       CHADS Score if applicable (history of atrial fib/flutter, Max=6)  Total Score: 0        Maximal Exercise Capacity           Pre-admit from 5/11/2017 in [facility]     Maximal Exercise Capacity  METS 4       Summary of Dependence  Total Score: 1      1 Is totally independent of others for activities of daily living       Phone Fraility Score (Max = 17)  Total Score: 1      1 Uses 5 or more meds on reg basis       Pain Factors      No data to display       Risk Triggers (Evidence-Based Risk Triggers)        Pulmonary Risk Triggers  Total Score: 1      1 Has trouble swallowing / handling saliva       Renal Risk Triggers  Total Score: 0        Delirium Risk Triggers  Total Score: 0        Urologic Risk Triggers  Total Score: 0        Logistics        Pre-op Clinic Logistics  Total Score: 6      1 Has had surgery within the last 3 months     1 Oral or IM for chronic condition (steroid dependent)     2 Takes herbal medications or vitamin supplements     1 Has had anesthesia, either as adult or as a child     1 Has / Has had Dx of schizophrenia, OCD, Other disease Rxed by psychiatrist       DOSC Logistics  Total Score: 3      1 Oral or IM for chronic condition (steroid dependent- Why in comment)     2 Has trouble swallowing / handling saliva       Discharge Logistics  Total Score: 1      1 Has trouble swallowing / handling saliva       Discharge Planning           Pre-admit from 5/11/2017 in  [facility]     Discharge Planning      Will assist patient 24/7, if needed  -- [parents]       Fast Track <For office use only>      Total Score: 7        Surgical Risk Level Assessment                 Triage considerations:     The patient has no apparent active cardiac condition (No unstable coronary Syndrome such as severe unstable angina or recent [<1 month] myocardial infarction, decompensated CHF, severe valvular   disease or significant arrhythmia)    Previous anesthesia records:3/17/17-Endobronchial Ultra Sound(EBUS)/Bronchoscopy-GeneralAirway/Jaw/Neck:  Airway Findings: Mouth Opening: Normal Mallampati: I -No PONV  -no apparent anesthetic complications     -Anesthesia Hx:  No previous Anesthesia     Last PCP note: within 3 months , within [facility] , Annual Exam  Subspecialty notes: Pulmonary, Dermatology/Infectious Disease/Obstetrics and Gynecology    Other important co-morbidities:   Diagnosis Date    Mastocytosis      Obsessive compulsive disorder         followed by Dr. Malone   Iron deficiency       Tests already available:  Results have been reviewed.Labs-4/13/17-Iron & TIBC/C-Reactive Protein/Sed Rate/AYAN/ 3/17/17-Donny-Barr Virus antibody/ 3/16/17-CBC/Vitamin B12/Iron& TIBC/ 3/9/17-CMP/CBC/  CXR-3/9/17            Instructions given. (See in Nurse's note)    Optimization:  Phone           Plan:    Testing:  None needed at this time      Patient  has previously scheduled Medical Appointment:Appointments on 5/9/17 & 5/10/17 prior to surgery.    Navigation:                            Straight Line to surgery.               No tests, anesthesia preop clinic visit, or consult required.                  Janis Esposito RN  5/2/17

## 2017-05-03 ENCOUNTER — DOCUMENTATION ONLY (OUTPATIENT)
Dept: CARDIOTHORACIC SURGERY | Facility: CLINIC | Age: 22
End: 2017-05-03

## 2017-05-03 NOTE — PATIENT CARE CONFERENCE
"[facility]      THORACIC MULTIDISCIPLINARY TUMOR BOARD  PATIENT REVIEW FORM  ________________________________________________________________________    CLINIC #: [id number]  DATE: 05/03/2017    TUMOR SITE:   Mediastinal Lymphadenopathy    PRESENTER:   Dr. Kim    PATIENT SUMMARY:   20 y/o who presented with progressed to fatigue, shortness of breath and dysphagia. Pt had a bronch/EBUS on 3/17/17 - endobronchial biopsies of fungating lesion and EBUS FNA which shows acute and chronic granulomatous inflammation.   Pt had a CT chest on 3/9/17 noting "Large right hilar and subcarinal necrotic lymph node enlargement with associated mild consolidation with air bronchogram in the right middle lobe ".   Another CT chest/abd on 4/13/17 "There is again evidence of mediastinal and right hilar lymphadenopathy, which appears minimally larger in size.  There are also scattered clusters of pulmonary micronodules including a 0.8 cm pulmonary nodule in the right lower lobes.  Given the patient's age this finding may reflect an infectious or inflammatory process, however, an underlying neoplasm, such as lymphoma, is not excluded."  Pt placed on antibiotics and steroids.     BOARD RECOMMENDATIONS:   Obtain CT chest with contrast to reassess for change after steroid treatment. Refer to thoracic surgery and plan for mediastinoscopy, possible bronchoscopy and possible right VATS biopsy.     CONSULT NEEDED:     [x] Surgery    [] Hem/Onc    [] Rad/Onc    [] Dietary                 [] Social Service    [] Psychology       [] Pulmonology    Clinical Stage: Tumor  Node(s)  Metastasis   Pathologic Stage: Tumor  Node(s)  Metastasis        GROUP STAGE:     [] O    [] 1A    [] IB    [] IIA    [] IIB     [] IIIA     [] IIIB     [] IIIC    [] IV                               [] Local recurrence     [] Regional recurrence     [] Distant recurrence                   [] NSCLC     [] SCLC     Tumor type     Unstageable:      [] Yes     [] No "  Metastatic site(s):          [x] Marcia'l Treatment Guidelines reviewed and care planned is consistent with guidelines.         (i.e., NCCN, NCI, PD, ACO, AUA, etc.)    PRESENTATION AT CANCER CONFERENCE:         [] Prospective    [] Retrospective     [] Follow-Up          [] Eligible for clinical trial

## 2017-05-09 ENCOUNTER — HOSPITAL ENCOUNTER (OUTPATIENT)
Dept: RADIOLOGY | Facility: HOSPITAL | Age: 22
Discharge: HOME | End: 2017-05-09
Attending: THORACIC SURGERY (CARDIOTHORACIC VASCULAR SURGERY)
Payer: COMMERCIAL

## 2017-05-09 DIAGNOSIS — R59.0 MEDIASTINAL LYMPHADENOPATHY: ICD-10-CM

## 2017-05-09 PROCEDURE — 71260 CT THORAX DX C+: CPT | Mod: 26,,, | Performed by: RADIOLOGY

## 2017-05-09 PROCEDURE — 71260 CT THORAX DX C+: CPT | Mod: TC

## 2017-05-09 PROCEDURE — 25500020 PHARM REV CODE 255: Performed by: THORACIC SURGERY (CARDIOTHORACIC VASCULAR SURGERY)

## 2017-05-09 RX ADMIN — IOHEXOL 75 ML: 350 INJECTION, SOLUTION INTRAVENOUS at 04:05

## 2017-05-10 ENCOUNTER — OFFICE VISIT (OUTPATIENT)
Dept: CARDIOTHORACIC SURGERY | Facility: CLINIC | Age: 22
End: 2017-05-10
Payer: COMMERCIAL

## 2017-05-10 ENCOUNTER — TELEPHONE (OUTPATIENT)
Dept: RHEUMATOLOGY | Facility: CLINIC | Age: 22
End: 2017-05-10

## 2017-05-10 VITALS
BODY MASS INDEX: 24.1 KG/M2 | HEIGHT: 63 IN | OXYGEN SATURATION: 99 % | DIASTOLIC BLOOD PRESSURE: 77 MMHG | WEIGHT: 136 LBS | SYSTOLIC BLOOD PRESSURE: 115 MMHG | HEART RATE: 86 BPM

## 2017-05-10 DIAGNOSIS — D73.89 LESION OF SPLEEN: Primary | ICD-10-CM

## 2017-05-10 DIAGNOSIS — R59.0 MEDIASTINAL LYMPHADENOPATHY: ICD-10-CM

## 2017-05-10 PROCEDURE — 99999 PR PBB SHADOW E&M-EST. PATIENT-LVL III: CPT | Mod: PBBFAC,,, | Performed by: THORACIC SURGERY (CARDIOTHORACIC VASCULAR SURGERY)

## 2017-05-10 PROCEDURE — 99204 OFFICE O/P NEW MOD 45 MIN: CPT | Mod: S$GLB,,, | Performed by: THORACIC SURGERY (CARDIOTHORACIC VASCULAR SURGERY)

## 2017-05-10 NOTE — LETTER
[facility]  [address]  [place] [address]  Phone: [phone]  Fax: [phone] May 10, 2017      Gloria Kim MD  [address]  [place] [address]    Patient: Donna Kumar   MR Number: [id number]   YOB: 1995   Date of Visit: 5/10/2017     Dear Dr. Kim:    Thank you for kindly consulting me to evaluate Ms. Kumar.  She presented today for evaluation of mediastinal lymphadenopathy. She has undergone an endobronchial ultrasound; however, this was nondiagnostic.  There were some concerns about possible lymphoma and additional tissue was needed.    I recommend that Ms. Kumar undergoes a mediastinoscopy with biopsy. There is a small possibility that a right VATS may be required.  I have discussed the technical aspects, risks and benefits of the procedure with Ms. Kumar and her mother.  We will proceed on Thursday, May 11, 2017.    Thank you for kindly soliciting my input into Ms. Kumar' care.    Sincerely,      Moustapha Kaur MD   Section of Thoracic Surgery  [facility] [place] [place]    BLP/hcr    CC  Brooklynn Boyle MD

## 2017-05-10 NOTE — TELEPHONE ENCOUNTER
Discussed case with Dr. Kim. Patient mediastinal and hilar adenopathy and nonspecific splenic lesion.  Low positive AYAN with negative profile.  Negative ANCAs. Patient will be undergoing mediastinoscopy.  Dr. Kim will contact me if rheumatology evaluation needed.

## 2017-05-10 NOTE — PROGRESS NOTES
History & Physical    SUBJECTIVE:     History of Present Illness:  21 y.o. female presents with PMH of mediastinal lymphadenopathy first identified in March 2017. Reports 6 month history of night sweats, chest tightness and pressure, dysphagia and SOB. She was prescribed a Z pack and then a course of Levaquin but symptoms persisted. She was referred to Dr. Kim and eventually underwent a bronch/EBUS on 3/17/17. Pathology showed acute and chronic granulomatous inflammation. Fungal and mycobacterial studies negative. On repeat chest CT the mediastinal lymphadenopathy persisted. Recently prescribed steroids which she reports has improved dysphagia and SOB.      PSH negative. Never smoker. She is in college in [place] and very active.     Chief Complaint   Patient presents with    Consult       Review of patient's allergies indicates:   Allergen Reactions    No known drug allergies        Current Outpatient Prescriptions   Medication Sig Dispense Refill    ACZONE 5 % topical gel Apply topically every morning. 60 g 3    cyanocobalamin (VITAMIN B-12) 1000 MCG tablet Take 1 tablet (1,000 mcg total) by mouth once daily. 90 tablet 3    ferrous sulfate 325 mg (65 mg iron) Tab tablet Take 1 tablet (325 mg total) by mouth daily with breakfast. 90 tablet 0    fluoxetine 10 MG Tab Take 1 tablet (10 mg total) by mouth once daily. (Patient taking differently: Take 10 mg by mouth once daily. ) 30 tablet 3    norethindrone-ethinyl estradiol (OVCON) 0.4-35 mg-mcg per tablet Take 1 tablet by mouth once daily. 30 tablet 11    predniSONE (DELTASONE) 20 MG tablet Take 40mg (two tablets) daily for two weeks then 20 mg daily until seen in clinic. (Patient taking differently: Take 40mg (two tablets) daily for two weeks then 20 mg daily until seen in clinic.) 50 tablet 1    sulfacetamide sodium-sulfur (SULFACLEANSE 8-4) 8-4 % Susp Wash face qhs 473 mL 3    tazarotene (TAZORAC) 0.05 % Crea cream Apply topically every evening. Apply  "thin film to face qhs after moisturizing. 60 g 1     No current facility-administered medications for this visit.        Past Medical History:   Diagnosis Date    B12 deficiency     Iron deficiency     Mastocytosis     Obsessive compulsive disorder     followed by Dr. Malone     Past Surgical History:   Procedure Laterality Date    LUNG BIOPSY  03/2017     Family History   Problem Relation Age of Onset    Hyperlipidemia Mother     Melanoma Mother     Allergies Mother     Cancer Mother     Other Father      ulcerative colitis    Allergies Brother     Depression Brother     Eczema Brother     Obesity Brother     Mental illness Sister      OCD, PANDAS    Psoriasis Neg Hx     Lupus Neg Hx     Anesthesia problems Neg Hx      Social History   Substance Use Topics    Smoking status: Never Smoker    Smokeless tobacco: Not on file    Alcohol use No        Review of Systems:  Review of Systems   Constitutional: Positive for fatigue. Negative for activity change, appetite change, diaphoresis and fever.   HENT: Positive for trouble swallowing. Negative for tinnitus and voice change.    Eyes: Negative.    Respiratory: Positive for chest tightness and shortness of breath. Negative for cough, wheezing and stridor.    Gastrointestinal: Negative for abdominal distention, abdominal pain, constipation, diarrhea, nausea and vomiting.   Endocrine: Negative.    Genitourinary: Negative.    Musculoskeletal: Negative.    Skin: Negative.    Allergic/Immunologic: Negative.    Neurological: Negative.    Hematological: Negative.    Psychiatric/Behavioral: Negative.        OBJECTIVE:     Vital Signs (Most Recent)  Pulse: 86 (05/10/17 0928)  BP: 115/77 (05/10/17 0928)  SpO2: 99 % (05/10/17 0928)  5' 3" (1.6 m)  61.7 kg (136 lb 0.4 oz)     Physical Exam:  Physical Exam   Constitutional: She is oriented to person, place, and time. She appears well-developed and well-nourished.   HENT:   Head: Normocephalic and atraumatic. "   Mouth/Throat: Oropharynx is clear and moist.   Eyes: EOM are normal. Pupils are equal, round, and reactive to light.   Neck: Normal range of motion. Neck supple. No tracheal deviation present. No thyromegaly present.   Cardiovascular: Normal rate, regular rhythm, normal heart sounds and intact distal pulses.    Pulmonary/Chest: Effort normal and breath sounds normal. No respiratory distress. She exhibits no tenderness.   Abdominal: Soft. Bowel sounds are normal. She exhibits no distension. There is no tenderness.   Musculoskeletal: Normal range of motion. She exhibits no edema or deformity.   Lymphadenopathy:     She has no cervical adenopathy.   Neurological: She is alert and oriented to person, place, and time.   Skin: Skin is warm and dry.   Psychiatric: She has a normal mood and affect.   Vitals reviewed.    Diagnostic Results:  5/9/17 Chest CT:  The patient has known mediastinal davey enlargement including subcarinal and RIGHT paratracheal nodes, plus RIGHT hilar davey enlargement. Subcarinal nodes have been used as the index lesion.  - 3/9/2017: 3.0 cm (axial series 2 image 22) with compression of the bronchus intermedius.  - 4/13/2017: 3.0 cm (axial series 4 image 79) but improved caliber of the bronchus intermedius indicating less compression by these nodes.  - 5/9/2017: 2.6 cm (axial series 2 image 51).  Therefore the size of the subcarinal nodes has diminished since 4/13/2017. Measurement of the RIGHT paratracheal node near the junction of LEFT and RIGHT innominate veins is limited by x-ray beam scatter off of concentrated contrast medium in the adjacent systemic veins.    CT of the abdomen and pelvis performed for/13/2017 revealed no subdiaphragmatic lymph node enlargement. I detect no lymph node enlargement on limited images of the upper abdomen today.    Esophagus maintains normal caliber and course.      There are at least 5 low attenuation lesions in the spleen on today's examination. One of these  was evident on 3/9/2017 (axial series 2 image 48; this lesion remains about the same size on the subsequent CT of chest abdomen pelvis dated for/13/2017 and today's study of 5/9/2017. However additional low attenuation lesions are evident in the spleen today. The 2 largest lesions including the original lesion are evident on axial series 2 image 104. I'm not convinced these are artifact due to 2 the phase of intravenous contrast enhancement of the spleen; rather I concerned that there are true splenic lesions with increasing in conspicuity over 2 months. Therefore I recommend splenic ultrasound to evaluate the echotexture of the spleen and confirm or exclude the presence of abnormal foci, most on the order of 1.5 cm long axis.     Tracheobronchial tree reveals no significant abnormality. As noted above, bronchus intermedius has a normal caliber on today's study, improved since 3/9/2017 when there was compression of its medial at aspect by enlarged subcarinal lymph node.     Lungs are symmetrically expanded. I detect no significant pulmonary disease.      ASSESSMENT/PLAN:     21 year old female with mediastinal lymphadenopathy and splenic lesions presents for surgical biopsy     PLAN:Plan   - Will order splenic US   - Proceed with bronchoscopy, mediastinoscopy and possible right VATS for open lung biopsy and mediastinal LN biopsy. Appropriate patient education regarding the magalis-operative period as well as intraperative details were discussed. Risks, including but not limited to, bleeding, infection, pain and anesthetic complication were discussed. Patient was given the opportunity to ask questions and to have those questions answered to their satisfaction. Patient verbalized understanding to both procedure and associated risks. Consent was obtained.          ATTENDING ATTESTATION:    I evaluated the patient and I agree with the assessment and plan.  I recommend a mediastinoscopy with biopsy and possible R VATS  mediastinal biopsy.  There is a very small risk of catastrophic mediastinal hemorrhage, recurrent nerve injury, pneumothorax and wound infection.  I have discussed the technical aspects, risks and benefits of the procedure with the patient.  I did inform the patient that the risks are the most common risks and that there are other less likely risks that are too numerous to elaborate.  The patient is aware and has agreed to undergo the procedure as detailed on the consent form.  Lastly, we will obtain a splenic US to better assess the CT abnormalities.

## 2017-05-11 ENCOUNTER — HOSPITAL ENCOUNTER (OUTPATIENT)
Dept: RADIOLOGY | Facility: HOSPITAL | Age: 22
Discharge: HOME | End: 2017-05-11
Attending: THORACIC SURGERY (CARDIOTHORACIC VASCULAR SURGERY) | Admitting: THORACIC SURGERY (CARDIOTHORACIC VASCULAR SURGERY)
Payer: COMMERCIAL

## 2017-05-11 ENCOUNTER — ANESTHESIA (OUTPATIENT)
Dept: SURGERY | Facility: HOSPITAL | Age: 22
End: 2017-05-11
Payer: COMMERCIAL

## 2017-05-11 ENCOUNTER — SURGERY (OUTPATIENT)
Age: 22
End: 2017-05-11

## 2017-05-11 ENCOUNTER — TELEPHONE (OUTPATIENT)
Dept: HEMATOLOGY/ONCOLOGY | Facility: CLINIC | Age: 22
End: 2017-05-11

## 2017-05-11 ENCOUNTER — HOSPITAL ENCOUNTER (OUTPATIENT)
Facility: HOSPITAL | Age: 22
Discharge: HOME | End: 2017-05-11
Attending: THORACIC SURGERY (CARDIOTHORACIC VASCULAR SURGERY) | Admitting: THORACIC SURGERY (CARDIOTHORACIC VASCULAR SURGERY)
Payer: COMMERCIAL

## 2017-05-11 VITALS
RESPIRATION RATE: 16 BRPM | OXYGEN SATURATION: 98 % | WEIGHT: 134 LBS | SYSTOLIC BLOOD PRESSURE: 119 MMHG | BODY MASS INDEX: 23.74 KG/M2 | TEMPERATURE: 98 F | HEIGHT: 63 IN | DIASTOLIC BLOOD PRESSURE: 73 MMHG | HEART RATE: 60 BPM

## 2017-05-11 DIAGNOSIS — E61.1 IRON DEFICIENCY: ICD-10-CM

## 2017-05-11 DIAGNOSIS — R59.0 MEDIASTINAL LYMPHADENOPATHY: ICD-10-CM

## 2017-05-11 DIAGNOSIS — D73.89 LESION OF SPLEEN: ICD-10-CM

## 2017-05-11 DIAGNOSIS — R59.0 MEDIASTINAL ADENOPATHY: Primary | ICD-10-CM

## 2017-05-11 DIAGNOSIS — E53.8 B12 DEFICIENCY: ICD-10-CM

## 2017-05-11 LAB
ABO + RH BLD: NORMAL
AMORPH CRY UR QL COMP ASSIST: NORMAL
B-HCG UR QL: NEGATIVE
BACTERIA #/AREA URNS AUTO: NORMAL /HPF
BILIRUB UR QL STRIP: NEGATIVE
BLD GP AB SCN CELLS X3 SERPL QL: NORMAL
C3 SERPL-MCNC: 147 MG/DL
C4 SERPL-MCNC: 16 MG/DL
CLARITY UR REFRACT.AUTO: ABNORMAL
COLOR UR AUTO: YELLOW
CTP QC/QA: YES
GLUCOSE UR QL STRIP: NEGATIVE
HGB UR QL STRIP: NEGATIVE
IGA SERPL-MCNC: 163 MG/DL
IGE SERPL-ACNC: <35 IU/ML
IGG SERPL-MCNC: 753 MG/DL
IGM SERPL-MCNC: 128 MG/DL
KETONES UR QL STRIP: NEGATIVE
LEUKOCYTE ESTERASE UR QL STRIP: ABNORMAL
MICROSCOPIC COMMENT: NORMAL
NITRITE UR QL STRIP: NEGATIVE
PH UR STRIP: 7 [PH] (ref 5–8)
PROT UR QL STRIP: NEGATIVE
SP GR UR STRIP: 1.01 (ref 1–1.03)
SQUAMOUS #/AREA URNS AUTO: 1 /HPF
URN SPEC COLLECT METH UR: ABNORMAL
UROBILINOGEN UR STRIP-ACNC: NEGATIVE EU/DL
WBC #/AREA URNS AUTO: 5 /HPF (ref 0–5)

## 2017-05-11 PROCEDURE — 81001 URINALYSIS AUTO W/SCOPE: CPT

## 2017-05-11 PROCEDURE — 82784 ASSAY IGA/IGD/IGG/IGM EACH: CPT | Mod: 59

## 2017-05-11 PROCEDURE — 36000711: Performed by: THORACIC SURGERY (CARDIOTHORACIC VASCULAR SURGERY)

## 2017-05-11 PROCEDURE — 39402 MEDIASTINOSCPY W/LMPH NOD BX: CPT | Mod: ,,, | Performed by: THORACIC SURGERY (CARDIOTHORACIC VASCULAR SURGERY)

## 2017-05-11 PROCEDURE — 25000003 PHARM REV CODE 250: Performed by: ANESTHESIOLOGY

## 2017-05-11 PROCEDURE — 86160 COMPLEMENT ANTIGEN: CPT

## 2017-05-11 PROCEDURE — 36620 INSERTION CATHETER ARTERY: CPT | Mod: 59,,, | Performed by: ANESTHESIOLOGY

## 2017-05-11 PROCEDURE — 36415 COLL VENOUS BLD VENIPUNCTURE: CPT

## 2017-05-11 PROCEDURE — 63600175 PHARM REV CODE 636 W HCPCS: Performed by: ANESTHESIOLOGY

## 2017-05-11 PROCEDURE — 27201423 OPTIME MED/SURG SUP & DEVICES STERILE SUPPLY: Performed by: THORACIC SURGERY (CARDIOTHORACIC VASCULAR SURGERY)

## 2017-05-11 PROCEDURE — 27201037 HC PRESSURE MONITORING SET UP

## 2017-05-11 PROCEDURE — 36000710: Performed by: THORACIC SURGERY (CARDIOTHORACIC VASCULAR SURGERY)

## 2017-05-11 PROCEDURE — 71000033 HC RECOVERY, INTIAL HOUR: Performed by: THORACIC SURGERY (CARDIOTHORACIC VASCULAR SURGERY)

## 2017-05-11 PROCEDURE — D9220A PRA ANESTHESIA: Mod: ,,, | Performed by: ANESTHESIOLOGY

## 2017-05-11 PROCEDURE — 82787 IGG 1 2 3 OR 4 EACH: CPT | Mod: 59

## 2017-05-11 PROCEDURE — 25000003 PHARM REV CODE 250: Performed by: STUDENT IN AN ORGANIZED HEALTH CARE EDUCATION/TRAINING PROGRAM

## 2017-05-11 PROCEDURE — 76705 ECHO EXAM OF ABDOMEN: CPT | Mod: TC

## 2017-05-11 PROCEDURE — 71000039 HC RECOVERY, EACH ADD'L HOUR: Performed by: THORACIC SURGERY (CARDIOTHORACIC VASCULAR SURGERY)

## 2017-05-11 PROCEDURE — 86900 BLOOD TYPING SEROLOGIC ABO: CPT

## 2017-05-11 PROCEDURE — 86160 COMPLEMENT ANTIGEN: CPT | Mod: 59

## 2017-05-11 PROCEDURE — 88305 TISSUE EXAM BY PATHOLOGIST: CPT | Performed by: PATHOLOGY

## 2017-05-11 PROCEDURE — C1729 CATH, DRAINAGE: HCPCS | Performed by: THORACIC SURGERY (CARDIOTHORACIC VASCULAR SURGERY)

## 2017-05-11 PROCEDURE — 63600175 PHARM REV CODE 636 W HCPCS

## 2017-05-11 PROCEDURE — 37000009 HC ANESTHESIA EA ADD 15 MINS: Performed by: THORACIC SURGERY (CARDIOTHORACIC VASCULAR SURGERY)

## 2017-05-11 PROCEDURE — 88312 SPECIAL STAINS GROUP 1: CPT | Mod: 26,,, | Performed by: PATHOLOGY

## 2017-05-11 PROCEDURE — 31625 BRONCHOSCOPY W/BIOPSY(S): CPT | Mod: 51,,, | Performed by: THORACIC SURGERY (CARDIOTHORACIC VASCULAR SURGERY)

## 2017-05-11 PROCEDURE — 86850 RBC ANTIBODY SCREEN: CPT

## 2017-05-11 PROCEDURE — C1769 GUIDE WIRE: HCPCS | Performed by: THORACIC SURGERY (CARDIOTHORACIC VASCULAR SURGERY)

## 2017-05-11 PROCEDURE — 25000003 PHARM REV CODE 250: Performed by: PHYSICIAN ASSISTANT

## 2017-05-11 PROCEDURE — 82785 ASSAY OF IGE: CPT

## 2017-05-11 PROCEDURE — 94760 N-INVAS EAR/PLS OXIMETRY 1: CPT

## 2017-05-11 PROCEDURE — 71000015 HC POSTOP RECOV 1ST HR: Performed by: THORACIC SURGERY (CARDIOTHORACIC VASCULAR SURGERY)

## 2017-05-11 PROCEDURE — 82164 ANGIOTENSIN I ENZYME TEST: CPT

## 2017-05-11 PROCEDURE — 81025 URINE PREGNANCY TEST: CPT | Performed by: THORACIC SURGERY (CARDIOTHORACIC VASCULAR SURGERY)

## 2017-05-11 PROCEDURE — 37000008 HC ANESTHESIA 1ST 15 MINUTES: Performed by: THORACIC SURGERY (CARDIOTHORACIC VASCULAR SURGERY)

## 2017-05-11 PROCEDURE — 86920 COMPATIBILITY TEST SPIN: CPT

## 2017-05-11 PROCEDURE — 76705 ECHO EXAM OF ABDOMEN: CPT | Mod: 26,,, | Performed by: RADIOLOGY

## 2017-05-11 PROCEDURE — 88305 TISSUE EXAM BY PATHOLOGIST: CPT | Mod: 26,,, | Performed by: PATHOLOGY

## 2017-05-11 RX ORDER — GLYCOPYRROLATE 0.2 MG/ML
INJECTION INTRAMUSCULAR; INTRAVENOUS
Status: DISCONTINUED | OUTPATIENT
Start: 2017-05-11 | End: 2017-05-11

## 2017-05-11 RX ORDER — FENTANYL CITRATE 50 UG/ML
25 INJECTION, SOLUTION INTRAMUSCULAR; INTRAVENOUS EVERY 5 MIN PRN
Status: DISCONTINUED | OUTPATIENT
Start: 2017-05-11 | End: 2017-05-11 | Stop reason: HOSPADM

## 2017-05-11 RX ORDER — METOCLOPRAMIDE HYDROCHLORIDE 5 MG/ML
10 INJECTION INTRAMUSCULAR; INTRAVENOUS EVERY 10 MIN PRN
Status: COMPLETED | OUTPATIENT
Start: 2017-05-11 | End: 2017-05-11

## 2017-05-11 RX ORDER — SODIUM CHLORIDE 0.9 % (FLUSH) 0.9 %
3 SYRINGE (ML) INJECTION
Status: DISCONTINUED | OUTPATIENT
Start: 2017-05-11 | End: 2017-05-11 | Stop reason: HOSPADM

## 2017-05-11 RX ORDER — ONDANSETRON 4 MG/1
4 TABLET, ORALLY DISINTEGRATING ORAL EVERY 8 HOURS PRN
Status: DISCONTINUED | OUTPATIENT
Start: 2017-05-11 | End: 2017-05-11 | Stop reason: HOSPADM

## 2017-05-11 RX ORDER — SODIUM CHLORIDE 9 MG/ML
INJECTION, SOLUTION INTRAVENOUS CONTINUOUS
Status: DISCONTINUED | OUTPATIENT
Start: 2017-05-11 | End: 2017-05-11 | Stop reason: HOSPADM

## 2017-05-11 RX ORDER — HYDROMORPHONE HYDROCHLORIDE 1 MG/ML
INJECTION, SOLUTION INTRAMUSCULAR; INTRAVENOUS; SUBCUTANEOUS
Status: COMPLETED
Start: 2017-05-11 | End: 2017-05-11

## 2017-05-11 RX ORDER — NEOSTIGMINE METHYLSULFATE 1 MG/ML
INJECTION, SOLUTION INTRAVENOUS
Status: DISCONTINUED | OUTPATIENT
Start: 2017-05-11 | End: 2017-05-11

## 2017-05-11 RX ORDER — FENTANYL CITRATE 50 UG/ML
INJECTION, SOLUTION INTRAMUSCULAR; INTRAVENOUS
Status: DISCONTINUED | OUTPATIENT
Start: 2017-05-11 | End: 2017-05-11

## 2017-05-11 RX ORDER — HYDROMORPHONE HYDROCHLORIDE 1 MG/ML
0.2 INJECTION, SOLUTION INTRAMUSCULAR; INTRAVENOUS; SUBCUTANEOUS EVERY 5 MIN PRN
Status: DISCONTINUED | OUTPATIENT
Start: 2017-05-11 | End: 2017-05-11 | Stop reason: HOSPADM

## 2017-05-11 RX ORDER — LIDOCAINE HCL/PF 100 MG/5ML
SYRINGE (ML) INTRAVENOUS
Status: DISCONTINUED | OUTPATIENT
Start: 2017-05-11 | End: 2017-05-11

## 2017-05-11 RX ORDER — PROPOFOL 10 MG/ML
VIAL (ML) INTRAVENOUS
Status: DISCONTINUED | OUTPATIENT
Start: 2017-05-11 | End: 2017-05-11

## 2017-05-11 RX ORDER — MIDAZOLAM HYDROCHLORIDE 1 MG/ML
INJECTION, SOLUTION INTRAMUSCULAR; INTRAVENOUS
Status: DISCONTINUED | OUTPATIENT
Start: 2017-05-11 | End: 2017-05-11

## 2017-05-11 RX ORDER — HYDROCODONE BITARTRATE AND ACETAMINOPHEN 5; 325 MG/1; MG/1
1 TABLET ORAL EVERY 4 HOURS PRN
Status: DISCONTINUED | OUTPATIENT
Start: 2017-05-11 | End: 2017-05-11 | Stop reason: HOSPADM

## 2017-05-11 RX ORDER — ACETAMINOPHEN 10 MG/ML
INJECTION, SOLUTION INTRAVENOUS
Status: COMPLETED
Start: 2017-05-11 | End: 2017-05-11

## 2017-05-11 RX ORDER — ONDANSETRON 2 MG/ML
4 INJECTION INTRAMUSCULAR; INTRAVENOUS DAILY PRN
Status: DISCONTINUED | OUTPATIENT
Start: 2017-05-11 | End: 2017-05-11 | Stop reason: HOSPADM

## 2017-05-11 RX ORDER — ROCURONIUM BROMIDE 10 MG/ML
INJECTION, SOLUTION INTRAVENOUS
Status: DISCONTINUED | OUTPATIENT
Start: 2017-05-11 | End: 2017-05-11

## 2017-05-11 RX ORDER — HYDROCODONE BITARTRATE AND ACETAMINOPHEN 5; 325 MG/1; MG/1
1 TABLET ORAL EVERY 4 HOURS PRN
Qty: 21 TABLET | Refills: 0 | Status: SHIPPED | OUTPATIENT
Start: 2017-05-11 | End: 2017-05-21

## 2017-05-11 RX ORDER — DEXAMETHASONE SODIUM PHOSPHATE 4 MG/ML
INJECTION, SOLUTION INTRA-ARTICULAR; INTRALESIONAL; INTRAMUSCULAR; INTRAVENOUS; SOFT TISSUE
Status: DISCONTINUED | OUTPATIENT
Start: 2017-05-11 | End: 2017-05-11

## 2017-05-11 RX ORDER — ONDANSETRON 2 MG/ML
INJECTION INTRAMUSCULAR; INTRAVENOUS
Status: DISCONTINUED | OUTPATIENT
Start: 2017-05-11 | End: 2017-05-11

## 2017-05-11 RX ORDER — SODIUM CHLORIDE 0.9 % (FLUSH) 0.9 %
3 SYRINGE (ML) INJECTION EVERY 8 HOURS
Status: DISCONTINUED | OUTPATIENT
Start: 2017-05-11 | End: 2017-05-11 | Stop reason: HOSPADM

## 2017-05-11 RX ORDER — OXYCODONE HYDROCHLORIDE 5 MG/1
5 TABLET ORAL
Status: DISCONTINUED | OUTPATIENT
Start: 2017-05-11 | End: 2017-05-11 | Stop reason: HOSPADM

## 2017-05-11 RX ADMIN — FENTANYL CITRATE 50 MCG: 50 INJECTION, SOLUTION INTRAMUSCULAR; INTRAVENOUS at 07:05

## 2017-05-11 RX ADMIN — PROPOFOL 200 MG: 10 INJECTION, EMULSION INTRAVENOUS at 07:05

## 2017-05-11 RX ADMIN — SODIUM CHLORIDE: 0.9 INJECTION, SOLUTION INTRAVENOUS at 06:05

## 2017-05-11 RX ADMIN — HYDROMORPHONE HYDROCHLORIDE 0.2 MG: 1 INJECTION, SOLUTION INTRAMUSCULAR; INTRAVENOUS; SUBCUTANEOUS at 09:05

## 2017-05-11 RX ADMIN — SODIUM CHLORIDE: 0.9 INJECTION, SOLUTION INTRAVENOUS at 08:05

## 2017-05-11 RX ADMIN — FENTANYL CITRATE 150 MCG: 50 INJECTION, SOLUTION INTRAMUSCULAR; INTRAVENOUS at 07:05

## 2017-05-11 RX ADMIN — FENTANYL CITRATE 25 MCG: 50 INJECTION INTRAMUSCULAR; INTRAVENOUS at 09:05

## 2017-05-11 RX ADMIN — ONDANSETRON 4 MG: 2 INJECTION INTRAMUSCULAR; INTRAVENOUS at 09:05

## 2017-05-11 RX ADMIN — OXYCODONE HYDROCHLORIDE 5 MG: 5 TABLET ORAL at 09:05

## 2017-05-11 RX ADMIN — MIDAZOLAM HYDROCHLORIDE 2 MG: 1 INJECTION, SOLUTION INTRAMUSCULAR; INTRAVENOUS at 06:05

## 2017-05-11 RX ADMIN — HYDROCODONE BITARTRATE AND ACETAMINOPHEN 1 TABLET: 5; 325 TABLET ORAL at 10:05

## 2017-05-11 RX ADMIN — FENTANYL CITRATE 50 MCG: 50 INJECTION, SOLUTION INTRAMUSCULAR; INTRAVENOUS at 08:05

## 2017-05-11 RX ADMIN — FENTANYL CITRATE 100 MCG: 50 INJECTION, SOLUTION INTRAMUSCULAR; INTRAVENOUS at 07:05

## 2017-05-11 RX ADMIN — ONDANSETRON 4 MG: 2 INJECTION INTRAMUSCULAR; INTRAVENOUS at 07:05

## 2017-05-11 RX ADMIN — HYDROCORTISONE SODIUM SUCCINATE 100 MG: 100 INJECTION, POWDER, FOR SOLUTION INTRAMUSCULAR; INTRAVENOUS at 07:05

## 2017-05-11 RX ADMIN — DEXAMETHASONE SODIUM PHOSPHATE 4 MG: 4 INJECTION, SOLUTION INTRAMUSCULAR; INTRAVENOUS at 07:05

## 2017-05-11 RX ADMIN — Medication 2 G: at 07:05

## 2017-05-11 RX ADMIN — NEOSTIGMINE METHYLSULFATE 5 MG: 1 INJECTION INTRAVENOUS at 08:05

## 2017-05-11 RX ADMIN — PROPOFOL 50 MG: 10 INJECTION, EMULSION INTRAVENOUS at 08:05

## 2017-05-11 RX ADMIN — ROCURONIUM BROMIDE 50 MG: 10 INJECTION, SOLUTION INTRAVENOUS at 07:05

## 2017-05-11 RX ADMIN — GLYCOPYRROLATE 0.6 MG: 0.2 INJECTION, SOLUTION INTRAMUSCULAR; INTRAVENOUS at 08:05

## 2017-05-11 RX ADMIN — LIDOCAINE HYDROCHLORIDE 75 MG: 20 INJECTION, SOLUTION INTRAVENOUS at 07:05

## 2017-05-11 RX ADMIN — ACETAMINOPHEN 1000 MG: 10 INJECTION, SOLUTION INTRAVENOUS at 09:05

## 2017-05-11 NOTE — DISCHARGE INSTRUCTIONS
Incision Care  Remember: Follow-up visits allow your doctor to make sure your incision is healing well. Be sure to keep your appointments.   Stitches (sutures), surgical staples, special strips of surgical tape called Steri-Strips, or surgical skin glue may be used to close incisions. They also help stop bleeding and speed healing. To help your incision heal, follow the tips on this handout.  Home care     Steri-Strips   · Always wash your hands before touching your incision.  · Keep your incision clean and dry.  · Avoid doing things that could cause dirt or sweat to get on your incision.  · Dont pick at scabs. They help protect the wound.  · Keep your incision out of water.  · Take a sponge bath to avoid getting your incision wet, unless your healthcare provider tells you otherwise.  · Ask your provider when can you take a shower or bathe.  · Ask your provider about the best way to keep your incision dry when bathing or showering.  · Pat sutures dry if they get wet. Dont rub.  · Leave the bandage (dressing) in place until you are told to remove it or change it. Change it only as directed, using clean hands.  · After the first 12 hours, change your dressing every 24 hours, or as directed by your healthcare provider.  · Change your dressing if it gets wet or soiled.  Care for specific closures  Follow these guidelines unless your healthcare provider tells you otherwise:  · Sutures or staples. Once you no longer need to keep these dry, clean the wound daily. First remove the bandage using clean hands. Then wash the area gently with soap and warm water. Use a wet cotton swab to loosen and remove any blood or crust that forms. After cleaning, put a thin layer of antibiotic ointment on. Then put on a new bandage.  · Skin glue. Dont put liquid, ointment, or cream on your wound while the glue is in place. Avoid activities that cause heavy sweating. Protect the wound from sunlight. Do not scratch, rub, or pick at the  glue. Do not put tape directly over the glue. The glue should peel off within 5 to 10 days.  · Surgical tape. Keep the area dry. If it gets wet, blot the area dry with a clean towel. Surgical tape usually falls off within 7 to 10 days. If it has not fallen off after 10 days, contact your healthcare provider before taking it off yourself. If you are told to remove the tape, put mineral oil or petroleum jelly on a cotton ball. Gently rub the tape until it is removed.  Changing your dressing     Wash your hands before changing a dressing.    Leave the dressing (bandage) in place until you are told to remove it or change it. Follow the instructions below unless told otherwise by your healthcare provider.  · Always wash your hands before changing your dressing.  · After the first 48 hours the incision wound usually will have closed. At this point, leave the incision uncovered and open to the air. If the incision has not closed keep it covered.  · Cover your incision only if your clothing is rubbing it or causing irritation.  · Change your dressing if it gets wet or soiled.  Follow-up care  Follow up with your healthcare provider to ask how long sutures or staples should be left in place. Be sure to return for suture or staple removal as directed. If dissolving stitches were used in your mouth, these will not need to be removed. They should fall out or dissolve on their own.  If tape closures were used, remove them yourself when your provider recommends if they have not fallen off on their own. If skin glue was used, the glue will wear off by itself.  When to seek medical care  Call your healthcare provider if you have any of the following:  · More pain, redness, swelling, bleeding, or foul-smelling discharge around the incision area  · Fever of 100.4°F (38ºC) or higher or as directed by your healthcare provider  · Shaking chills  · Vomiting or nausea that doesn't go away  · Numbness, coldness, or tingling around the  incision area, or changes in skin color  · Opening of the sutures or wound  · Stitches or staples come apart or fall out or surgical tape falls off before 7 days or as directed by your healthcare provider   Date Last Reviewed: 10/16/2014  © 3017-2594 [employer]. [address] [place] [address]. All rights reserved. This information is not intended as a substitute for professional medical care. Always follow your healthcare professional's instructions.

## 2017-05-11 NOTE — ANESTHESIA RELEASE NOTE
"Anesthesia Release from PACU Note    Patient: Donna Kumar    Procedure(s) Performed: Procedure(s) (LRB):  MEDIASTINOSCOPY (N/A)  BRONCHOSCOPY-OPERATIVE,FLEXIBLE (N/A)    Anesthesia type: general    Post pain: Adequate analgesia    Post assessment: no apparent anesthetic complications, tolerated procedure well and no evidence of recall    Last Vitals:   Visit Vitals    /83    Pulse (!) 57    Temp 36.5 °C (97.7 °F) (Oral)    Resp 15    Ht 5' 3" (1.6 m)    Wt 60.8 kg (134 lb)    LMP 05/07/2017 (Approximate)    SpO2 95%    Breastfeeding No    BMI 23.74 kg/m2       Post vital signs: stable    Level of consciousness: awake, alert  and oriented    Nausea/Vomiting: no nausea/no vomiting    Complications: none    Airway Patency: patent    Respiratory: unassisted, spontaneous ventilation, room air    Cardiovascular: stable and blood pressure at baseline    Hydration: euvolemic  "

## 2017-05-11 NOTE — ANESTHESIA POSTPROCEDURE EVALUATION
"Anesthesia Post Evaluation    Patient: Donna Kumar    Procedure(s) Performed: Procedure(s) (LRB):  MEDIASTINOSCOPY (N/A)  BRONCHOSCOPY-OPERATIVE,FLEXIBLE (N/A)    Final Anesthesia Type: general  Patient location during evaluation: PACU  Patient participation: Yes- Able to Participate  Level of consciousness: awake and alert  Post-procedure vital signs: reviewed and stable  Pain management: adequate  Airway patency: patent  PONV status at discharge: No PONV  Anesthetic complications: no      Cardiovascular status: blood pressure returned to baseline and hemodynamically stable  Respiratory status: unassisted, spontaneous ventilation and room air  Hydration status: euvolemic  Follow-up not needed.        Visit Vitals    /83    Pulse (!) 57    Temp 36.5 °C (97.7 °F) (Oral)    Resp 15    Ht 5' 3" (1.6 m)    Wt 60.8 kg (134 lb)    LMP 05/07/2017 (Approximate)    SpO2 95%    Breastfeeding No    BMI 23.74 kg/m2       Pain/Cecilia Score: Pain Assessment Performed: Yes (5/11/2017  8:42 AM)  Presence of Pain: complains of pain/discomfort (5/11/2017  8:42 AM)  Pain Rating Prior to Med Admin: 4 (5/11/2017  9:25 AM)  Cecilia Score: 10 (5/11/2017  8:42 AM)      "

## 2017-05-11 NOTE — H&P (VIEW-ONLY)
History & Physical    SUBJECTIVE:     History of Present Illness:  21 y.o. female presents with PMH of mediastinal lymphadenopathy first identified in March 2017. Reports 6 month history of night sweats, chest tightness and pressure, dysphagia and SOB. She was prescribed a Z pack and then a course of Levaquin but symptoms persisted. She was referred to Dr. Kim and eventually underwent a bronch/EBUS on 3/17/17. Pathology showed acute and chronic granulomatous inflammation. Fungal and mycobacterial studies negative. On repeat chest CT the mediastinal lymphadenopathy persisted. Recently prescribed steroids which she reports has improved dysphagia and SOB.      PSH negative. Never smoker. She is in college in [place] and very active.     Chief Complaint   Patient presents with    Consult       Review of patient's allergies indicates:   Allergen Reactions    No known drug allergies        Current Outpatient Prescriptions   Medication Sig Dispense Refill    ACZONE 5 % topical gel Apply topically every morning. 60 g 3    cyanocobalamin (VITAMIN B-12) 1000 MCG tablet Take 1 tablet (1,000 mcg total) by mouth once daily. 90 tablet 3    ferrous sulfate 325 mg (65 mg iron) Tab tablet Take 1 tablet (325 mg total) by mouth daily with breakfast. 90 tablet 0    fluoxetine 10 MG Tab Take 1 tablet (10 mg total) by mouth once daily. (Patient taking differently: Take 10 mg by mouth once daily. ) 30 tablet 3    norethindrone-ethinyl estradiol (OVCON) 0.4-35 mg-mcg per tablet Take 1 tablet by mouth once daily. 30 tablet 11    predniSONE (DELTASONE) 20 MG tablet Take 40mg (two tablets) daily for two weeks then 20 mg daily until seen in clinic. (Patient taking differently: Take 40mg (two tablets) daily for two weeks then 20 mg daily until seen in clinic.) 50 tablet 1    sulfacetamide sodium-sulfur (SULFACLEANSE 8-4) 8-4 % Susp Wash face qhs 473 mL 3    tazarotene (TAZORAC) 0.05 % Crea cream Apply topically every evening. Apply  "thin film to face qhs after moisturizing. 60 g 1     No current facility-administered medications for this visit.        Past Medical History:   Diagnosis Date    B12 deficiency     Iron deficiency     Mastocytosis     Obsessive compulsive disorder     followed by Dr. Malone     Past Surgical History:   Procedure Laterality Date    LUNG BIOPSY  03/2017     Family History   Problem Relation Age of Onset    Hyperlipidemia Mother     Melanoma Mother     Allergies Mother     Cancer Mother     Other Father      ulcerative colitis    Allergies Brother     Depression Brother     Eczema Brother     Obesity Brother     Mental illness Sister      OCD, PANDAS    Psoriasis Neg Hx     Lupus Neg Hx     Anesthesia problems Neg Hx      Social History   Substance Use Topics    Smoking status: Never Smoker    Smokeless tobacco: Not on file    Alcohol use No        Review of Systems:  Review of Systems   Constitutional: Positive for fatigue. Negative for activity change, appetite change, diaphoresis and fever.   HENT: Positive for trouble swallowing. Negative for tinnitus and voice change.    Eyes: Negative.    Respiratory: Positive for chest tightness and shortness of breath. Negative for cough, wheezing and stridor.    Gastrointestinal: Negative for abdominal distention, abdominal pain, constipation, diarrhea, nausea and vomiting.   Endocrine: Negative.    Genitourinary: Negative.    Musculoskeletal: Negative.    Skin: Negative.    Allergic/Immunologic: Negative.    Neurological: Negative.    Hematological: Negative.    Psychiatric/Behavioral: Negative.        OBJECTIVE:     Vital Signs (Most Recent)  Pulse: 86 (05/10/17 0928)  BP: 115/77 (05/10/17 0928)  SpO2: 99 % (05/10/17 0928)  5' 3" (1.6 m)  61.7 kg (136 lb 0.4 oz)     Physical Exam:  Physical Exam   Constitutional: She is oriented to person, place, and time. She appears well-developed and well-nourished.   HENT:   Head: Normocephalic and atraumatic. "   Mouth/Throat: Oropharynx is clear and moist.   Eyes: EOM are normal. Pupils are equal, round, and reactive to light.   Neck: Normal range of motion. Neck supple. No tracheal deviation present. No thyromegaly present.   Cardiovascular: Normal rate, regular rhythm, normal heart sounds and intact distal pulses.    Pulmonary/Chest: Effort normal and breath sounds normal. No respiratory distress. She exhibits no tenderness.   Abdominal: Soft. Bowel sounds are normal. She exhibits no distension. There is no tenderness.   Musculoskeletal: Normal range of motion. She exhibits no edema or deformity.   Lymphadenopathy:     She has no cervical adenopathy.   Neurological: She is alert and oriented to person, place, and time.   Skin: Skin is warm and dry.   Psychiatric: She has a normal mood and affect.   Vitals reviewed.    Diagnostic Results:  5/9/17 Chest CT:  The patient has known mediastinal davey enlargement including subcarinal and RIGHT paratracheal nodes, plus RIGHT hilar davey enlargement. Subcarinal nodes have been used as the index lesion.  - 3/9/2017: 3.0 cm (axial series 2 image 22) with compression of the bronchus intermedius.  - 4/13/2017: 3.0 cm (axial series 4 image 79) but improved caliber of the bronchus intermedius indicating less compression by these nodes.  - 5/9/2017: 2.6 cm (axial series 2 image 51).  Therefore the size of the subcarinal nodes has diminished since 4/13/2017. Measurement of the RIGHT paratracheal node near the junction of LEFT and RIGHT innominate veins is limited by x-ray beam scatter off of concentrated contrast medium in the adjacent systemic veins.    CT of the abdomen and pelvis performed for/13/2017 revealed no subdiaphragmatic lymph node enlargement. I detect no lymph node enlargement on limited images of the upper abdomen today.    Esophagus maintains normal caliber and course.      There are at least 5 low attenuation lesions in the spleen on today's examination. One of these  was evident on 3/9/2017 (axial series 2 image 48; this lesion remains about the same size on the subsequent CT of chest abdomen pelvis dated for/13/2017 and today's study of 5/9/2017. However additional low attenuation lesions are evident in the spleen today. The 2 largest lesions including the original lesion are evident on axial series 2 image 104. I'm not convinced these are artifact due to 2 the phase of intravenous contrast enhancement of the spleen; rather I concerned that there are true splenic lesions with increasing in conspicuity over 2 months. Therefore I recommend splenic ultrasound to evaluate the echotexture of the spleen and confirm or exclude the presence of abnormal foci, most on the order of 1.5 cm long axis.     Tracheobronchial tree reveals no significant abnormality. As noted above, bronchus intermedius has a normal caliber on today's study, improved since 3/9/2017 when there was compression of its medial at aspect by enlarged subcarinal lymph node.     Lungs are symmetrically expanded. I detect no significant pulmonary disease.      ASSESSMENT/PLAN:     21 year old female with mediastinal lymphadenopathy and splenic lesions presents for surgical biopsy     PLAN:Plan   - Will order splenic US   - Proceed with bronchoscopy, mediastinoscopy and possible right VATS for open lung biopsy and mediastinal LN biopsy. Appropriate patient education regarding the magalis-operative period as well as intraperative details were discussed. Risks, including but not limited to, bleeding, infection, pain and anesthetic complication were discussed. Patient was given the opportunity to ask questions and to have those questions answered to their satisfaction. Patient verbalized understanding to both procedure and associated risks. Consent was obtained.          ATTENDING ATTESTATION:    I evaluated the patient and I agree with the assessment and plan.  I recommend a mediastinoscopy with biopsy and possible R VATS  mediastinal biopsy.  There is a very small risk of catastrophic mediastinal hemorrhage, recurrent nerve injury, pneumothorax and wound infection.  I have discussed the technical aspects, risks and benefits of the procedure with the patient.  I did inform the patient that the risks are the most common risks and that there are other less likely risks that are too numerous to elaborate.  The patient is aware and has agreed to undergo the procedure as detailed on the consent form.  Lastly, we will obtain a splenic US to better assess the CT abnormalities.

## 2017-05-11 NOTE — IP AVS SNAPSHOT
[facility]  [address]  [facility] [address]  Phone: [phone]           Patient Discharge Instructions   Our goal is to set you up for success. This packet includes information on your condition, medications, and your home care.  It will help you care for yourself to prevent having to return to the hospital.     Please ask your nurse if you have any questions.      There are many details to remember when preparing to leave the hospital. Here is what you will need to do:    1. Take your medicine. If you are prescribed medications, review your Medication List on the following pages. You may have new medications to  at the pharmacy and others that you'll need to stop taking. Review the instructions for how and when to take your medications. Talk with your doctor or nurses if you are unsure of what to do.     2. Go to your follow-up appointments. Specific follow-up information is listed in the following pages. Your may be contacted by a nurse or clinical provider about future appointments. Be sure we have all of the phone numbers to reach you. Please contact your provider's office if you are unable to make an appointment.     3. Watch for warning signs. Your doctor or nurse will give you detailed warning signs to watch for and when to call for assistance. These instructions may also include educational information about your condition. If you experience any of warning signs to your health, call your doctor.           [facility]  Unless otherwise directed by your provider, please   contact [facility], our nurse care line   that is available for 24/7 assistance.     [phone] (toll-free)     Registered nurses in the [facility]   provide: appointment scheduling, clinical advisement, health education, and other advisory services.                  ** Verify the list of medication(s) below is accurate and up to date. Carry this with you in case of  emergency. If your medications have changed, please notify your healthcare provider.             Medication List      START taking these medications        Additional Info                      hydrocodone-acetaminophen 5-325mg 5-325 mg per tablet   Commonly known as:  NORCO   Quantity:  21 tablet   Refills:  0   Dose:  1 tablet    Last time this was given:  1 tablet on 5/11/2017 10:25 AM   Instructions:  Take 1 tablet by mouth every 4 (four) hours as needed for Pain.     Begin Date    AM    Noon    PM    Bedtime         CHANGE how you take these medications        Additional Info                      predniSONE 20 MG tablet   Commonly known as:  DELTASONE   Quantity:  50 tablet   Refills:  1   What changed:  additional instructions    Instructions:  Take 40mg (two tablets) daily for two weeks then 20 mg daily until seen in clinic.     Begin Date    AM    Noon    PM    Bedtime         CONTINUE taking these medications        Additional Info                      ACZONE 5 % topical gel   Quantity:  60 g   Refills:  3   Generic drug:  dapsone    Instructions:  Apply topically every morning.     Begin Date    AM    Noon    PM    Bedtime       cyanocobalamin 1000 MCG tablet   Commonly known as:  VITAMIN B-12   Quantity:  90 tablet   Refills:  3   Dose:  1000 mcg    Instructions:  Take 1 tablet (1,000 mcg total) by mouth once daily.     Begin Date    AM    Noon    PM    Bedtime       ferrous sulfate 325 mg (65 mg iron) Tab tablet   Quantity:  90 tablet   Refills:  0   Dose:  325 mg    Instructions:  Take 1 tablet (325 mg total) by mouth daily with breakfast.     Begin Date    AM    Noon    PM    Bedtime       fluoxetine 10 MG Tab   Quantity:  30 tablet   Refills:  3   Dose:  10 mg    Instructions:  Take 1 tablet (10 mg total) by mouth once daily.     Begin Date    AM    Noon    PM    Bedtime       norethindrone-ethinyl estradiol 0.4-35 mg-mcg per tablet   Commonly known as:  OVCON   Quantity:  30 tablet   Refills:  11    Dose:  1 tablet    Instructions:  Take 1 tablet by mouth once daily.     Begin Date    AM    Noon    PM    Bedtime       sulfacetamide sodium-sulfur 8-4 % Susp   Commonly known as:  SULFACLEANSE 8-4   Quantity:  473 mL   Refills:  3    Instructions:  Wash face qhs     Begin Date    AM    Noon    PM    Bedtime       tazarotene 0.05 % Crea cream   Commonly known as:  TAZORAC   Quantity:  60 g   Refills:  1    Instructions:  Apply topically every evening. Apply thin film to face qhs after moisturizing.     Begin Date    AM    Noon    PM    Bedtime            Where to Get Your Medications      You can get these medications from any pharmacy     Bring a paper prescription for each of these medications     hydrocodone-acetaminophen 5-325mg 5-325 mg per tablet                  Please bring to all follow up appointments:    1. A copy of your discharge instructions.  2. All medicines you are currently taking in their original bottles.  3. Identification and insurance card.    Please arrive 15 minutes ahead of scheduled appointment time.    Please call 24 hours in advance if you must reschedule your appointment and/or time.        Your Scheduled Appointments     May 11, 2017  2:00 PM CDT   Us Abdomen with [facility] US 11 ALL [facility]    [address] [facility] [address] [address] [phone]              Follow-up Information     Follow up with Moustapha Kaur MD.    Specialty:  Cardiothoracic Surgery    Why:  As needed    Contact information:    [address] [facility] [id number] [phone]          Discharge Instructions     Future Orders    Activity as tolerated     Call MD for:  difficulty breathing, headache or visual disturbances     Call MD for:  persistent nausea and vomiting     Call MD for:  severe uncontrolled pain     Call MD for:  temperature >100.4     Change dressing (specify)     Comments:    As needed after initial dressing removed.    Diet  general     Questions:    Total calories:      Fat restriction, if any:      Protein restriction, if any:      Na restriction, if any:      Fluid restriction:      Additional restrictions:      Remove dressing in 48 hours         Discharge Instructions         Incision Care  Remember: Follow-up visits allow your doctor to make sure your incision is healing well. Be sure to keep your appointments.   Stitches (sutures), surgical staples, special strips of surgical tape called Steri-Strips, or surgical skin glue may be used to close incisions. They also help stop bleeding and speed healing. To help your incision heal, follow the tips on this handout.  Home care     Steri-Strips   · Always wash your hands before touching your incision.  · Keep your incision clean and dry.  · Avoid doing things that could cause dirt or sweat to get on your incision.  · Dont pick at scabs. They help protect the wound.  · Keep your incision out of water.  · Take a sponge bath to avoid getting your incision wet, unless your healthcare provider tells you otherwise.  · Ask your provider when can you take a shower or bathe.  · Ask your provider about the best way to keep your incision dry when bathing or showering.  · Pat sutures dry if they get wet. Dont rub.  · Leave the bandage (dressing) in place until you are told to remove it or change it. Change it only as directed, using clean hands.  · After the first 12 hours, change your dressing every 24 hours, or as directed by your healthcare provider.  · Change your dressing if it gets wet or soiled.  Care for specific closures  Follow these guidelines unless your healthcare provider tells you otherwise:  · Sutures or staples. Once you no longer need to keep these dry, clean the wound daily. First remove the bandage using clean hands. Then wash the area gently with soap and warm water. Use a wet cotton swab to loosen and remove any blood or crust that forms. After cleaning, put a thin layer of  antibiotic ointment on. Then put on a new bandage.  · Skin glue. Dont put liquid, ointment, or cream on your wound while the glue is in place. Avoid activities that cause heavy sweating. Protect the wound from sunlight. Do not scratch, rub, or pick at the glue. Do not put tape directly over the glue. The glue should peel off within 5 to 10 days.  · Surgical tape. Keep the area dry. If it gets wet, blot the area dry with a clean towel. Surgical tape usually falls off within 7 to 10 days. If it has not fallen off after 10 days, contact your healthcare provider before taking it off yourself. If you are told to remove the tape, put mineral oil or petroleum jelly on a cotton ball. Gently rub the tape until it is removed.  Changing your dressing     Wash your hands before changing a dressing.    Leave the dressing (bandage) in place until you are told to remove it or change it. Follow the instructions below unless told otherwise by your healthcare provider.  · Always wash your hands before changing your dressing.  · After the first 48 hours the incision wound usually will have closed. At this point, leave the incision uncovered and open to the air. If the incision has not closed keep it covered.  · Cover your incision only if your clothing is rubbing it or causing irritation.  · Change your dressing if it gets wet or soiled.  Follow-up care  Follow up with your healthcare provider to ask how long sutures or staples should be left in place. Be sure to return for suture or staple removal as directed. If dissolving stitches were used in your mouth, these will not need to be removed. They should fall out or dissolve on their own.  If tape closures were used, remove them yourself when your provider recommends if they have not fallen off on their own. If skin glue was used, the glue will wear off by itself.  When to seek medical care  Call your healthcare provider if you have any of the following:  · More pain, redness,  "swelling, bleeding, or foul-smelling discharge around the incision area  · Fever of 100.4°F (38ºC) or higher or as directed by your healthcare provider  · Shaking chills  · Vomiting or nausea that doesn't go away  · Numbness, coldness, or tingling around the incision area, or changes in skin color  · Opening of the sutures or wound  · Stitches or staples come apart or fall out or surgical tape falls off before 7 days or as directed by your healthcare provider   Date Last Reviewed: 10/16/2014  © 4683-8295 [employer]. [address] [place] [address]. All rights reserved. This information is not intended as a substitute for professional medical care. Always follow your healthcare professional's instructions.            Primary Diagnosis     Your primary diagnosis was:  Mediastinal Disease      Admission Information     Date & Time Provider Department CSN    5/11/2017  5:36 AM Moustapha Kaur MD [facility] [id number]      Care Providers     Provider Role Specialty Primary office phone    Moustapha Kaur MD Attending Provider Cardiothoracic Surgery [phone]    Moustapha Kaur MD Surgeon  Cardiothoracic Surgery [phone]      Your Vitals Were     BP Pulse Temp Resp Height Weight    119/73 60 97.5 °F (36.4 °C) (Oral) 16 5' 3" (1.6 m) 60.8 kg (134 lb)    Last Period SpO2 BMI          05/07/2017 (Approximate) 98% 23.74 kg/m2        Recent Lab Values     No lab values to display.      Pending Labs     Order Current Status    Specimen to Pathology - Surgery Collected (05/11/17 0830)    Angiotensin converting enzyme In process    C3 complement In process    C4 complement In process    IgE In process    IgG 1, 2, 3, and 4 In process    Immunoglobulin G Subclass 4 In process    Immunoglobulins (IgG, IgA, IgM) Quantitative In process    Prepare RBC 2 Units; surgery Preliminary result      Allergies as of 5/11/2017        Reactions    No Known Drug Allergies     "   Advance Directives     An advance directive is a document which, in the event you are no longer able to make decisions for yourself, tells your healthcare team what kind of treatment you do or do not want to receive, or who you would like to make those decisions for you.  If you do not currently have an advance directive, [facility] encourages you to create one.  For more information call:  [phone]), [phone] ([phone]),  or log on to [email or web address].        Language Assistance Services     ATTENTION: Language assistance services are available, free of charge. Please call [phone].      ATENCIÓN: Si cristina farfan, tiene a hebert disposición servicios gratuitos de asistencia lingüística. Llame al [phone].     CHÚ Ý: N?u b?n nói Ti?ng Vi?t, có các d?ch v? h? tr? ngôn ng? mi?n phí dành cho b?n. G?i s? [phone].         [facility] complies with applicable Federal civil rights laws and does not discriminate on the basis of race, color, national origin, age, disability, or sex.

## 2017-05-11 NOTE — ANESTHESIA PROCEDURE NOTES
Arterial    Diagnosis: mediastinal lymphadenopathy    Patient location during procedure: done in OR  Procedure start time: 5/11/2017 7:08 AM  Timeout: 5/11/2017 7:08 AM  Procedure end time: 5/11/2017 7:16 AM  Staffing  Anesthesiologist: GAIL MILLER  Other anesthesia staff: TATE GRULLON  Performed by: other anesthesia staff   Anesthesiologist was present at the time of the procedure.  Preanesthetic Checklist  Completed: patient identified, site marked, surgical consent, pre-op evaluation, timeout performed, IV checked, risks and benefits discussed, monitors and equipment checked and anesthesia consent given  Arterial Line  Skin Prep: chlorhexidine gluconate  Local Infiltration: none  Orientation: right  Location: radial  Catheter Size: 20 G{[facility] ANESTHESIA BLOCK ART PLACEMENTInsertion Attempts: 2  Assessment  Dressing: secured with tape and tegaderm  Patient: Tolerated well

## 2017-05-11 NOTE — INTERVAL H&P NOTE
The patient has been examined and the H&P has been reviewed:    I concur with the findings and no changes have occurred since H&P was written.     Patient feeling well, ready for procedure.     Anesthesia/Surgery risks, benefits and alternative options discussed and understood by patient/family.          Active Hospital Problems    Diagnosis  POA    Mediastinal lymphadenopathy [R59.0]  Yes      Resolved Hospital Problems    Diagnosis Date Resolved POA   No resolved problems to display.

## 2017-05-11 NOTE — OP NOTE
DATE OF PROCEDURE:  05/11/2017    PREOPERATIVE DIAGNOSIS:  Mediastinal lymphadenopathy.    POSTOPERATIVE DIAGNOSIS:  Mediastinal lymphadenopathy.    PROCEDURES:  1.  Mediastinoscopy.  2.  Flexible bronchoscopy with biopsy.    SURGEON:  Moustapha Kaur M.D.    FIRST ASSISTANT:  Farzad Louie M.D. (RES)    ANESTHESIA:  General.    INDICATIONS FOR PROCEDURE:  Ms. Kumar is a 21-year-old who has mediastinal   lymphadenopathy and multiple splenic lesions.  She had some mild-to-moderate   constitutional symptoms that seemed to have responded to prednisone therapy.    She recently underwent a flexible bronchoscopy with bronchial biopsy and   endobronchial ultrasound biopsy that was nondiagnostic.  I was asked to perform   a mediastinoscopy with biopsy for diagnostic purposes.    PROCEDURE IN DETAIL:  The patient was taken to the Operating Room and placed   supine on the operating table.  The patient was identified.  IV antibiotics were   given.  The patient was intubated with a single lumen tube.  A timeout   procedure was performed.  The patient's chest was prepped and draped in a   sterile fashion.  I made a 2 cm curvilinear incision at the base of the neck   along the course of the patient's natural skin lines.  I divided the platysma   and  the strap muscles along the median raphe.  I entered the   pretracheal plane.  I bluntly dissected distally along the pretracheal plane;   however, I encountered considerable mediastinal fibrosis distal to the   innominate artery.  I inserted a mediastinoscope and began to bluntly dissect in   the right paratracheal space beyond the innominate artery.  I did not encounter   any lymphadenopathy; however, there was considerable fibrosis.  After several   minutes of attempting to divide the fibrotic tissue and identify a suitable   dissection plane as well as lymphadenopathy, I aborted the procedure.  The wound   was closed in multiple layers and a sterile dressing applied.   I then performed   a flexible bronchoscopy.  The mid to distal trachea, major emerson and left   airway were within normal limits.  The distal right main stem bronchus had an   area of posterior membranous mucosal abnormalities.  I took several biopsies   from this area and injected dilute epinephrine along the biopsy site to minimize   bleeding.  I concluded the procedure at this point.  The biopsy specimens were   sent for permanent pathologic analysis.  I did not attempt a right VATS   considering the degree of fibrosis that I encountered through the   mediastinoscope.    I, Moustapha Kaur, attest that I was present for and scrubbed for the entire   procedure.  Furthermore, I performed the critical and key portions of the   procedure as surgeon.      AMRTIN  dd: 05/11/2017 09:23:09 (CDT)  td: 05/11/2017 13:40:28 (CDT)  Doc ID   #[id number]  Job ID #[id number]    CC:

## 2017-05-11 NOTE — BRIEF OP NOTE
[facility]  Brief Operative Note     SUMMARY     ATTENDING ATTESTATION:  I was present for and either directly assisted with or performed the critical and key portions of the procedure.    Surgery Date: 5/11/2017     Surgeon(s) and Role:     * Farzad Louie MD - Resident - Assisting     * Moustapha Kaur MD - Primary        Pre-op Diagnosis:  Mediastinal lymphadenopathy [R59.0]    Post-op Diagnosis:  Post-Op Diagnosis Codes:     * Mediastinal lymphadenopathy [R59.0]    Procedure(s) (LRB):  MEDIASTINOSCOPY (N/A)  BRONCHOSCOPY-OPERATIVE,FLEXIBLE (N/A)    Anesthesia: General    Technical Procedures Performed: Mediastinoscopy, Flexible Bronchoscopy with right mainstem bronchus biopsy    Findings/Key Components: Mediastinoscopy performed, unable to biopsy lymph nodes due to dense fibrosis. Flexible bronchoscopy with no evidence of stenosis or mass. Right main stem posterior membranous mucosal abnormality. Right mainstem bronchus biopsy.     Estimated Blood Loss: <5mL         Specimens:   Specimen (12h ago through future)    Start     Ordered    05/11/17 0822  Specimen to Pathology - Surgery  Once     Comments:  1. Right main stem biopsy (permanent)    05/11/17 0830          Discharge Note    SUMMARY     Admit Date: 5/11/2017    Discharge Date and Time:  05/11/2017 8:46 AM    Hospital Course (synopsis of major diagnoses, care, treatment, and services provided during the course of the hospital stay): Patient came to hospital for outpatient procedure (mediastinoscopy and flex bronchoscopy) and tolerated procedure well.      Final Diagnosis: Post-Op Diagnosis Codes:     * Mediastinal lymphadenopathy [R59.0]    Disposition: Home or Self Care    Follow Up/Patient Instructions:     Medications:  Reconciled Home Medications:   Current Discharge Medication List      START taking these medications    Details   hydrocodone-acetaminophen 5-325mg (NORCO) 5-325 mg per tablet Take 1 tablet by mouth every 4  (four) hours as needed for Pain.  Qty: 21 tablet, Refills: 0         CONTINUE these medications which have NOT CHANGED    Details   ACZONE 5 % topical gel Apply topically every morning.  Qty: 60 g, Refills: 3    Associated Diagnoses: Acne vulgaris      cyanocobalamin (VITAMIN B-12) 1000 MCG tablet Take 1 tablet (1,000 mcg total) by mouth once daily.  Qty: 90 tablet, Refills: 3    Associated Diagnoses: Vitamin B 12 deficiency      ferrous sulfate 325 mg (65 mg iron) Tab tablet Take 1 tablet (325 mg total) by mouth daily with breakfast.  Qty: 90 tablet, Refills: 0    Associated Diagnoses: Iron deficiency anemia due to chronic blood loss      fluoxetine 10 MG Tab Take 1 tablet (10 mg total) by mouth once daily.  Qty: 30 tablet, Refills: 3    Associated Diagnoses: Obsessive-compulsive disorder, unspecified type      norethindrone-ethinyl estradiol (OVCON) 0.4-35 mg-mcg per tablet Take 1 tablet by mouth once daily.  Qty: 30 tablet, Refills: 11    Associated Diagnoses: Encounter for contraceptive management, unspecified contraceptive encounter type      predniSONE (DELTASONE) 20 MG tablet Take 40mg (two tablets) daily for two weeks then 20 mg daily until seen in clinic.  Qty: 50 tablet, Refills: 1      sulfacetamide sodium-sulfur (SULFACLEANSE 8-4) 8-4 % Susp Wash face qhs  Qty: 473 mL, Refills: 3    Associated Diagnoses: Acne vulgaris      tazarotene (TAZORAC) 0.05 % Crea cream Apply topically every evening. Apply thin film to face qhs after moisturizing.  Qty: 60 g, Refills: 1    Associated Diagnoses: Acne vulgaris             Discharge Procedure Orders  Diet general     Activity as tolerated     Call MD for:  temperature >100.4     Call MD for:  persistent nausea and vomiting     Call MD for:  severe uncontrolled pain     Call MD for:  difficulty breathing, headache or visual disturbances     Remove dressing in 48 hours     Change dressing (specify)   Order Comments: As needed after initial dressing removed.        Follow-up Information     Follow up with Moustapha Kaur MD.    Specialty:  Cardiothoracic Surgery    Why:  As needed    Contact information:    [id number]  [facility] [address]  [phone]           Farzad Louie MD  Thoracic Surgery

## 2017-05-11 NOTE — TRANSFER OF CARE
"Anesthesia Transfer of Care Note    Patient: Donna Kumar    Procedure(s) Performed: Procedure(s) (LRB):  MEDIASTINOSCOPY (N/A)  BRONCHOSCOPY-OPERATIVE,FLEXIBLE (N/A)    Patient location: PACU    Anesthesia Type: general    Transport from OR: Transported from OR on room air with adequate spontaneous ventilation    Post pain: adequate analgesia    Post assessment: no apparent anesthetic complications    Post vital signs: stable    Level of consciousness: awake    Nausea/Vomiting: no nausea/vomiting    Complications: none    Transfer of care protocol was followed      Last vitals:   Visit Vitals    BP (!) 143/80    Pulse 75    Temp 36.5 °C (97.7 °F) (Oral)    Resp 19    Ht 5' 3" (1.6 m)    Wt 60.8 kg (134 lb)    LMP 05/07/2017 (Approximate)    SpO2 95%    Breastfeeding No    BMI 23.74 kg/m2     "

## 2017-05-11 NOTE — PLAN OF CARE
Discharge instructions given and explained to patient and family with verbalization of understanding all instructions. Prescription given and explained next time and doses of each medication. Patients v/s stable, denies n/v and tolerating po, rates pain level tolerable, IV removed, and family at bedside for patient discharge home.

## 2017-05-12 ENCOUNTER — TELEPHONE (OUTPATIENT)
Dept: CARDIOTHORACIC SURGERY | Facility: CLINIC | Age: 22
End: 2017-05-12

## 2017-05-12 LAB
ACE SERPL-CCNC: 30 U/L
IGG1 SER-MCNC: 490 MG/DL
IGG2 SER-MCNC: 199 MG/DL
IGG3 SER-MCNC: 20 MG/DL
IGG4 SER-MCNC: 64 MG/DL
IGG4 SER-MCNC: 64 MG/DL

## 2017-05-12 NOTE — TELEPHONE ENCOUNTER
Called to check on the pt, spoke with her mother. She reports the pt is doing well. She remove her surgical bandage tomorrow. Advised to leave steri strips in place and the ends will coil and come off slowly. Dr. Kaur would like to see her back on 5/24 to review pathology. Pt is agreeable and has our contact phone # to call with any questions/concerns.

## 2017-05-15 LAB
BLD PROD TYP BPU: NORMAL
BLD PROD TYP BPU: NORMAL
BLOOD UNIT EXPIRATION DATE: NORMAL
BLOOD UNIT EXPIRATION DATE: NORMAL
BLOOD UNIT TYPE CODE: 5100
BLOOD UNIT TYPE CODE: 5100
BLOOD UNIT TYPE: NORMAL
BLOOD UNIT TYPE: NORMAL
CODING SYSTEM: NORMAL
CODING SYSTEM: NORMAL
DISPENSE STATUS: NORMAL
DISPENSE STATUS: NORMAL
TRANS ERYTHROCYTES VOL PATIENT: NORMAL ML
TRANS ERYTHROCYTES VOL PATIENT: NORMAL ML

## 2017-05-16 ENCOUNTER — PATIENT MESSAGE (OUTPATIENT)
Dept: DERMATOLOGY | Facility: CLINIC | Age: 22
End: 2017-05-16

## 2017-05-17 ENCOUNTER — INITIAL CONSULT (OUTPATIENT)
Dept: HEMATOLOGY/ONCOLOGY | Facility: CLINIC | Age: 22
End: 2017-05-17
Attending: INTERNAL MEDICINE
Payer: COMMERCIAL

## 2017-05-17 ENCOUNTER — LAB VISIT (OUTPATIENT)
Dept: LAB | Facility: OTHER | Age: 22
End: 2017-05-17
Attending: INTERNAL MEDICINE
Payer: COMMERCIAL

## 2017-05-17 ENCOUNTER — TELEPHONE (OUTPATIENT)
Dept: HEMATOLOGY/ONCOLOGY | Facility: CLINIC | Age: 22
End: 2017-05-17

## 2017-05-17 DIAGNOSIS — R59.9 ADENOPATHY: ICD-10-CM

## 2017-05-17 DIAGNOSIS — R59.9 ADENOPATHY: Primary | ICD-10-CM

## 2017-05-17 DIAGNOSIS — R59.0 MEDIASTINAL LYMPHADENOPATHY: Primary | ICD-10-CM

## 2017-05-17 DIAGNOSIS — R59.0 MEDIASTINAL LYMPHADENOPATHY: ICD-10-CM

## 2017-05-17 LAB
ALBUMIN SERPL BCP-MCNC: 3.8 G/DL
ALP SERPL-CCNC: 50 U/L
ALT SERPL W/O P-5'-P-CCNC: 11 U/L
ANION GAP SERPL CALC-SCNC: 22 MMOL/L
AST SERPL-CCNC: 16 U/L
BILIRUB SERPL-MCNC: 0.4 MG/DL
BUN SERPL-MCNC: 17 MG/DL
CALCIUM SERPL-MCNC: 9.7 MG/DL
CHLORIDE SERPL-SCNC: 103 MMOL/L
CO2 SERPL-SCNC: 16 MMOL/L
CREAT SERPL-MCNC: 1.1 MG/DL
ERYTHROCYTE [DISTWIDTH] IN BLOOD BY AUTOMATED COUNT: 13.7 %
EST. GFR  (AFRICAN AMERICAN): >60 ML/MIN/1.73 M^2
EST. GFR  (NON AFRICAN AMERICAN): >60 ML/MIN/1.73 M^2
FLOW CYTOMETRY ANTIBODIES ANALYZED - FLUID: NORMAL
FLOW CYTOMETRY COMMENT - FLUID: NORMAL
FLOW CYTOMETRY INTERPRETATION - FLUID: NORMAL
FLUID TYPE: NORMAL
GLUCOSE SERPL-MCNC: 117 MG/DL
HCT VFR BLD AUTO: 40.1 %
HGB BLD-MCNC: 13.2 G/DL
LDH SERPL L TO P-CCNC: 116 U/L
MCH RBC QN AUTO: 27.9 PG
MCHC RBC AUTO-ENTMCNC: 32.9 %
MCV RBC AUTO: 85 FL
NEUTROPHILS # BLD AUTO: 11.4 K/UL
PLATELET # BLD AUTO: 303 K/UL
PMV BLD AUTO: 9.9 FL
POTASSIUM SERPL-SCNC: 4.1 MMOL/L
PROT SERPL-MCNC: 7.8 G/DL
RBC # BLD AUTO: 4.73 M/UL
SODIUM SERPL-SCNC: 141 MMOL/L
URATE SERPL-MCNC: 7.6 MG/DL
WBC # BLD AUTO: 12.45 K/UL

## 2017-05-17 PROCEDURE — 85027 COMPLETE CBC AUTOMATED: CPT

## 2017-05-17 PROCEDURE — 84550 ASSAY OF BLOOD/URIC ACID: CPT

## 2017-05-17 PROCEDURE — 80053 COMPREHEN METABOLIC PANEL: CPT

## 2017-05-17 PROCEDURE — 99999 PR PBB SHADOW E&M-EST. PATIENT-LVL I: CPT | Mod: PBBFAC,,, | Performed by: INTERNAL MEDICINE

## 2017-05-17 PROCEDURE — 36415 COLL VENOUS BLD VENIPUNCTURE: CPT

## 2017-05-17 PROCEDURE — 99205 OFFICE O/P NEW HI 60 MIN: CPT | Mod: S$GLB,,, | Performed by: INTERNAL MEDICINE

## 2017-05-17 PROCEDURE — 83615 LACTATE (LD) (LDH) ENZYME: CPT

## 2017-05-17 NOTE — PROGRESS NOTES
Subjective:       Patient ID: Donna Kumar is a 21 y.o. female.    Chief Complaint: No chief complaint on file.    HPI   REFERRING PHYSICIAN:  Camila Lynn M.D. and Moustapha Kaur M.D.    REASON FOR REFERRAL:  Mediastinal adenopathy.    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  Her symptoms started around   Thanksgiving and consisted primarily of night sweats.  A chest x-ray was   obtained in early March by her primary care physician and showed asymmetric   granulomatous changes in the right hilum.  The patient subsequently underwent a   CT scan of the chest that showed a large necrotic subcarinal lymph node   measuring almost 3 cm and there was evidence of a large necrotic right hilar   lymph node as well.  The patient was seen by Dr. Rand by Dr. Kim, has   undergone a bronchoscopy with an endobronchial biopsy that showed evidence of   acute and chronic inflammatory infiltrates, necrosis and vague granulomatous   inflammation.  There was no evidence of malignancy.  She underwent an exhaustive   infection workup by Dr. Rand that was essentially nondiagnostic.  In late   April, she was empirically started on prednisone by Dr. Kim with resolution of   her night sweats.  She was recently seen by Dr. Kaur and underwent an   attempted mediastinoscopy.  Results from the procedure are currently pending,   however, Dr. Kaur has indicated that he was not able to get to the   visualized lymph nodes due to a dense inflammatory and fibers reaction.  She is   referred for evaluation for possible lymphoma.    PAST MEDICAL HISTORY:  As above.  She had been diagnosed with mastocytosis at   the age of [age].  No further workup.    FAMILY HISTORY:  Her mother has been diagnosed with malignant melanoma.    SOCIAL HISTORY:  She does not smoke and drinks socially.  She is a student at   [employer].  She traveled last year to  [place], [place],   [place], and [place].  There was no exposure to any rural areas.          Review of Systems      Overall she feels well.   She no longer has night sweats and states that they resolved once she was started on steroids by Dr. Kim in late April.  She denies any anxiety, depression, easy bruising, fevers, chills, night  sweats, weight loss, nausea, vomiting, diarrhea, constipation, diplopia, blurred vision, headache, chest pain, palpitations, shortness of breath, breast pain, abdominal pain, extremity pain, or difficulty ambulating.  The remainder of the ten-point ROS, including general, skin, lymph, H/N, cardiorespiratory, GI, , Neuro, Endocrine, and psychiatric is negative.     Objective:      Physical Exam    She is alert, oriented to time, place, person, pleasant, well      nourished, in no acute physical distress.   She is accompanied by her mother.                                 VITAL SIGNS:  Reviewed                                      HEENT:  Normal.  There are no nasal, oral, lip, gingival, auricular, lid,    or conjunctival lesions.  Mucosae are moist and pink, and there is no        thrush.  Pupils are equal, reactive to light and accommodation.              Extraocular muscle movements are intact.  Dentition is good.  There is no frontal or maxillary tenderness.                                     NECK:  Supple without JVD, adenopathy, or thyromegaly.  A scar from her recent mediastinoscopy procedure is seen.                     LUNGS:  Clear to auscultation without wheezing, rales, or rhonchi.           CARDIOVASCULAR:  Reveals an S1, S2, no murmurs, no rubs, no gallops.         ABDOMEN:  Soft, nontender, without organomegaly.  Bowel sounds are    present.                                                                     EXTREMITIES:  No cyanosis, clubbing, or edema.                               BREASTS:  Deferred                                       LYMPHATIC:  There is no  cervical, axillary, inguinal, or supraclavicular adenopathy.   SKIN:  Warm and moist, without petechiae, rashes, induration, or ecchymoses.           NEUROLOGIC:  DTRs are 0-1+ bilaterally, symmetrical, motor function is 5/5,  and cranial nerves are  within normal limits.    Assessment:       1. Adenopathy            Plan:        I had a long discussion with Miss Kumar and her mother.  She has significant mediastinal adenopathy and she had night sweats for several months, until she was started on steroids.  She has undergone an exhaustive infectious diseases workup which has been essentially negative, even though the quantiferon test has not been reported yet and I do not see the results of an HIV test.  Of note, her T spot screening test was negative.  I explained to her that Hodgkin's disease may present with adenopathy and night sweats, and it will need to be ruled out.  I recommended that she consider a bone marrow aspirate and biopsy.  If the aspirate and biopsy are normal, I would recommend that a thoracoscopic procedure be attempted for an excisional mediastinal node biopsy.    Her diagnosis is unclear at this point, and does not necessarily have to be lymphoma; Castleman's disease and sarcoidosis can also explain her presentation.  I suspect that eventually she will need an excisional biopsy of a mediastinal node, and I have told her so.  I would not be in favor of a splenic biopsy.  She will let me know later today if she wants to undergo a bone marrow biopsy tomorrow, which happens to be her 22st birthday.  If not, we will proceed with a PET scan on May 19th and reschedule her bone marrow biopsy for May 22, 2017.  Her multiple questions were answered to her satisfaction.  We will also repeat basic labs today, and ask Dr. Rand to check her quantiferon results. Will also obtain an HIV test to complete her infectious disease workup.  I will reevaluate her a week from today at the [facility].

## 2017-05-18 ENCOUNTER — ANESTHESIA EVENT (OUTPATIENT)
Dept: SURGERY | Facility: HOSPITAL | Age: 22
End: 2017-05-18
Payer: COMMERCIAL

## 2017-05-18 ENCOUNTER — ANESTHESIA (OUTPATIENT)
Dept: SURGERY | Facility: HOSPITAL | Age: 22
End: 2017-05-18
Payer: COMMERCIAL

## 2017-05-18 ENCOUNTER — HOSPITAL ENCOUNTER (OUTPATIENT)
Facility: HOSPITAL | Age: 22
Discharge: HOME | End: 2017-05-18
Attending: INTERNAL MEDICINE | Admitting: INTERNAL MEDICINE
Payer: COMMERCIAL

## 2017-05-18 VITALS
SYSTOLIC BLOOD PRESSURE: 149 MMHG | HEART RATE: 74 BPM | OXYGEN SATURATION: 100 % | RESPIRATION RATE: 20 BRPM | DIASTOLIC BLOOD PRESSURE: 91 MMHG | BODY MASS INDEX: 23.74 KG/M2 | HEIGHT: 63 IN | TEMPERATURE: 98 F | WEIGHT: 134 LBS

## 2017-05-18 DIAGNOSIS — R59.9 ADENOPATHY: ICD-10-CM

## 2017-05-18 LAB
B-HCG UR QL: NEGATIVE
BONE MARROW WRIGHT STAIN COMMENT: NORMAL
CTP QC/QA: YES

## 2017-05-18 PROCEDURE — 88313 SPECIAL STAINS GROUP 2: CPT | Mod: 26,,, | Performed by: PATHOLOGY

## 2017-05-18 PROCEDURE — 71000015 HC POSTOP RECOV 1ST HR: Performed by: INTERNAL MEDICINE

## 2017-05-18 PROCEDURE — 88305 TISSUE EXAM BY PATHOLOGIST: CPT | Mod: 26,,, | Performed by: PATHOLOGY

## 2017-05-18 PROCEDURE — 88184 FLOWCYTOMETRY/ TC 1 MARKER: CPT | Performed by: PATHOLOGY

## 2017-05-18 PROCEDURE — 88291 CYTO/MOLECULAR REPORT: CPT

## 2017-05-18 PROCEDURE — 37000008 HC ANESTHESIA 1ST 15 MINUTES: Performed by: INTERNAL MEDICINE

## 2017-05-18 PROCEDURE — 38221 DX BONE MARROW BIOPSIES: CPT | Mod: LT,,, | Performed by: NURSE PRACTITIONER

## 2017-05-18 PROCEDURE — 88299 UNLISTED CYTOGENETIC STUDY: CPT

## 2017-05-18 PROCEDURE — 25000003 PHARM REV CODE 250: Performed by: INTERNAL MEDICINE

## 2017-05-18 PROCEDURE — 88311 DECALCIFY TISSUE: CPT | Mod: 26,,, | Performed by: PATHOLOGY

## 2017-05-18 PROCEDURE — 36000705 HC OR TIME LEV I EA ADD 15 MIN: Performed by: INTERNAL MEDICINE

## 2017-05-18 PROCEDURE — 71000033 HC RECOVERY, INTIAL HOUR: Performed by: INTERNAL MEDICINE

## 2017-05-18 PROCEDURE — 88237 TISSUE CULTURE BONE MARROW: CPT

## 2017-05-18 PROCEDURE — 88305 TISSUE EXAM BY PATHOLOGIST: CPT | Performed by: PATHOLOGY

## 2017-05-18 PROCEDURE — 88189 FLOWCYTOMETRY/READ 16 & >: CPT | Mod: ,,, | Performed by: PATHOLOGY

## 2017-05-18 PROCEDURE — D9220A PRA ANESTHESIA: Mod: CRNA,,, | Performed by: NURSE ANESTHETIST, CERTIFIED REGISTERED

## 2017-05-18 PROCEDURE — 25000003 PHARM REV CODE 250: Performed by: ANESTHESIOLOGY

## 2017-05-18 PROCEDURE — 25000003 PHARM REV CODE 250

## 2017-05-18 PROCEDURE — 36000704 HC OR TIME LEV I 1ST 15 MIN: Performed by: INTERNAL MEDICINE

## 2017-05-18 PROCEDURE — 88264 CHROMOSOME ANALYSIS 20-25: CPT

## 2017-05-18 PROCEDURE — 85097 BONE MARROW INTERPRETATION: CPT | Mod: ,,, | Performed by: PATHOLOGY

## 2017-05-18 PROCEDURE — 88185 FLOWCYTOMETRY/TC ADD-ON: CPT | Performed by: PATHOLOGY

## 2017-05-18 PROCEDURE — 81025 URINE PREGNANCY TEST: CPT | Performed by: ANESTHESIOLOGY

## 2017-05-18 PROCEDURE — 37000009 HC ANESTHESIA EA ADD 15 MINS: Performed by: INTERNAL MEDICINE

## 2017-05-18 PROCEDURE — 88313 SPECIAL STAINS GROUP 2: CPT

## 2017-05-18 PROCEDURE — 71000044 HC DOSC ROUTINE RECOVERY FIRST HOUR: Performed by: INTERNAL MEDICINE

## 2017-05-18 PROCEDURE — D9220A PRA ANESTHESIA: Mod: ANES,,, | Performed by: ANESTHESIOLOGY

## 2017-05-18 PROCEDURE — 63600175 PHARM REV CODE 636 W HCPCS

## 2017-05-18 RX ORDER — SODIUM CHLORIDE 9 MG/ML
INJECTION, SOLUTION INTRAVENOUS CONTINUOUS
Status: DISCONTINUED | OUTPATIENT
Start: 2017-05-18 | End: 2017-05-18 | Stop reason: HOSPADM

## 2017-05-18 RX ORDER — PROPOFOL 10 MG/ML
VIAL (ML) INTRAVENOUS
Status: DISCONTINUED | OUTPATIENT
Start: 2017-05-18 | End: 2017-05-18

## 2017-05-18 RX ORDER — LIDOCAINE HYDROCHLORIDE 10 MG/ML
INJECTION, SOLUTION EPIDURAL; INFILTRATION; INTRACAUDAL; PERINEURAL
Status: DISCONTINUED | OUTPATIENT
Start: 2017-05-18 | End: 2017-05-18 | Stop reason: HOSPADM

## 2017-05-18 RX ORDER — LIDOCAINE HCL/PF 100 MG/5ML
SYRINGE (ML) INTRAVENOUS
Status: DISCONTINUED | OUTPATIENT
Start: 2017-05-18 | End: 2017-05-18

## 2017-05-18 RX ORDER — PROPOFOL 10 MG/ML
VIAL (ML) INTRAVENOUS CONTINUOUS PRN
Status: DISCONTINUED | OUTPATIENT
Start: 2017-05-18 | End: 2017-05-18

## 2017-05-18 RX ORDER — LIDOCAINE HYDROCHLORIDE 10 MG/ML
1 INJECTION, SOLUTION EPIDURAL; INFILTRATION; INTRACAUDAL; PERINEURAL ONCE
Status: COMPLETED | OUTPATIENT
Start: 2017-05-18 | End: 2017-05-18

## 2017-05-18 RX ADMIN — SODIUM CHLORIDE: 0.9 INJECTION, SOLUTION INTRAVENOUS at 09:05

## 2017-05-18 RX ADMIN — PROPOFOL 200 MCG/KG/MIN: 10 INJECTION, EMULSION INTRAVENOUS at 10:05

## 2017-05-18 RX ADMIN — LIDOCAINE HYDROCHLORIDE 60 MG: 20 INJECTION, SOLUTION INTRAVENOUS at 10:05

## 2017-05-18 RX ADMIN — PROPOFOL 40 MG: 10 INJECTION, EMULSION INTRAVENOUS at 10:05

## 2017-05-18 RX ADMIN — LIDOCAINE HYDROCHLORIDE 0.2 MG: 10 INJECTION, SOLUTION EPIDURAL; INFILTRATION; INTRACAUDAL; PERINEURAL at 09:05

## 2017-05-18 NOTE — BRIEF OP NOTE
PROCEDURE NOTE:  Date of procedure: 5/18/2017  Bone Marrow aspiration and biopsy  Indication: Adenopathy and B symtpoms  Consent: Informed consent was obtained from patient.  Timeout: Done and documented.  Position: Right lateral  Site: Left posterior illiac crest.  Prep: Betadine.  Needle used: 11 gauge Jamshidi needle.  Anesthetic: 1% lidocaine 5 cc.  Biopsy: The biopsy needle was introduced into the marrow cavity and an aspirate was obtained without complications and sent for flow, cytogenetics and DNA hold. Core biopsy obtained without difficulty and sent for routine histologic examination.  Complications: None.  EBL: minimal  Disposition: The patient was discharged home per anaesthesia protocol.    Birgit Castellanos NP  Hematology/BMT

## 2017-05-18 NOTE — ANESTHESIA POSTPROCEDURE EVALUATION
"Anesthesia Post Evaluation    Patient: Donna Kumar    Procedure(s) Performed: Procedure(s) (LRB):  BIOPSY-BONE MARROW (Left)    Final Anesthesia Type: general  Patient location during evaluation: PACU  Patient participation: Yes- Able to Participate  Level of consciousness: awake and alert  Post-procedure vital signs: reviewed and stable  Pain management: adequate  Airway patency: patent  PONV status at discharge: No PONV  Anesthetic complications: no      Cardiovascular status: blood pressure returned to baseline and stable  Respiratory status: unassisted  Hydration status: euvolemic  Follow-up not needed.        Visit Vitals    BP (!) 149/91 (BP Location: Left arm, Patient Position: Sitting, BP Method: Automatic)    Pulse 74    Temp 36.8 °C (98.3 °F) (Oral)    Resp 20    Ht 5' 3" (1.6 m)    Wt 60.8 kg (134 lb)    LMP 05/18/2017    SpO2 100%    Breastfeeding No    BMI 23.74 kg/m2       Pain/Cecilia Score: Pain Assessment Performed: Yes (5/18/2017 10:55 AM)  Presence of Pain: denies (5/18/2017 10:55 AM)  Modified Cecilia Score: 20 (5/18/2017 10:55 AM)      "

## 2017-05-18 NOTE — ANESTHESIA RELEASE NOTE
"Anesthesia Release from PACU Note    Patient: Donna Kumar    Procedure(s) Performed: Procedure(s) (LRB):  BIOPSY-BONE MARROW (Left)    Anesthesia type: GEN    Post pain: Adequate analgesia reported    Post assessment: no apparent anesthetic complications, tolerated procedure well and no evidence of recall    Post vital signs: /79 (BP Location: Left arm, Patient Position: Sitting, BP Method: Automatic)  Pulse 83  Temp 36.8 °C (98.3 °F) (Oral)   Resp 20  Ht 5' 3" (1.6 m)  Wt 60.8 kg (134 lb)  LMP 05/18/2017 Comment: upt negative  SpO2 100%  Breastfeeding? No  BMI 23.74 kg/m2    Level of consciousness: awake, alert and oriented    Nausea/Vomiting: no nausea/no vomiting    Complications: none    Airway Patency: patent    Respiratory: unassisted, spontaneous ventilation, room air    Cardiovascular: stable and blood pressure at baseline    Hydration: euvolemic    "

## 2017-05-18 NOTE — ANESTHESIA PREPROCEDURE EVALUATION
05/18/2017  Donna Kumar is a 22 y.o., female.    Anesthesia Evaluation    I have reviewed the Patient Summary Reports.        Review of Systems  Anesthesia Hx:  No problems with previous Anesthesia    Social:  Non-Smoker    Hematology/Oncology:  Hematology Normal      Current/Recent Cancer. (Mediastinal adenopathy of unknown origin)   EENT/Dental:EENT/Dental Normal   Cardiovascular:  Cardiovascular Normal     Pulmonary:  Pulmonary Normal    Renal/:  Renal/ Normal     Hepatic/GI:  Hepatic/GI Normal    Musculoskeletal:  Musculoskeletal Normal    Neurological:  Neurology Normal    Endocrine:  Endocrine Normal    Dermatological:  Skin Normal    Psych:  Psychiatric Normal           Physical Exam  General:  Well nourished    Airway/Jaw/Neck:  Airway Findings: Mouth Opening: Normal Tongue: Normal  General Airway Assessment: Adult  Mallampati: II  Improves to II with phonation.  TM Distance: Normal, at least 6 cm  Jaw/Neck Findings:  Neck ROM: Normal ROM      Dental:  Dental Findings: In tact   Chest/Lungs:  Chest/Lungs Findings: Clear to auscultation, Normal Respiratory Rate     Heart/Vascular:  Heart Findings: Rate: Normal  Rhythm: Regular Rhythm  Sounds: Normal             Anesthesia Plan  Type of Anesthesia, risks & benefits discussed:  Anesthesia Type:  general  Patient's Preference: General  Intra-op Monitoring Plan:   Intra-op Monitoring Plan Comments:   Post Op Pain Control Plan:   Post Op Pain Control Plan Comments:   Induction:   IV  Beta Blocker:  Patient is not currently on a Beta-Blocker (No further documentation required).       Informed Consent: Patient understands risks and agrees with Anesthesia plan.  Questions answered. Anesthesia consent signed with patient.  ASA Score: 2     Day of Surgery Review of History & Physical: I have interviewed and examined the patient. I have reviewed the  patient's H&P dated:  There are no significant changes.          Ready For Surgery From Anesthesia Perspective.

## 2017-05-18 NOTE — DISCHARGE INSTRUCTIONS
Discharge instructions for having a Bone Marrow Aspiration / Biopsy    Keep Bandage in place for 24 hours.  - Do not shower or take a tube bath during this time. (you may sponge bathe).  - Call the nurse or physician for excessive bleeding or pain at biopsy site.  - You may take Tylenol as needed for pain.    You have received medication to sedate you.  - Do not drive a car or operate heavy machinery for the rest of the day.  - You may resume other activities as tolerated.    You can call [phone] for any problems during the hours of 8:00 AM-5:00PM.    For an emergency after 5:00 PM you can call [phone] and have the  page the Hematologist / Oncologist on call.

## 2017-05-18 NOTE — H&P
[facility]  Hematology/Oncology  H&P    Patient Name: Donna Kumar  MRN: [id number]  Admission Date: 5/18/2017  Code Status: No Order   Attending Provider: Jeffery Wright MD  Primary Care Physician: Brooklynn Boyle MD  Principal Problem:<principal problem not specified>    Subjective:     HPI: 22 year old female with B symptoms and adenopathy presents today for bone marrow aspiration and biopsy. For full history please see Dr. Wright's consult note dated 5/18/17. Patient has no complaints today.    Oncology Treatment Plan:   [No treatment plan]    Medications:  Continuous Infusions:   sodium chloride 0.9% 10 mL/hr at 05/18/17 0925     Scheduled Meds:   PRN Meds:     Review of patient's allergies indicates:   Allergen Reactions    No known drug allergies         Past Medical History:   Diagnosis Date    B12 deficiency     Iron deficiency     Mastocytosis     Obsessive compulsive disorder     followed by Dr. Malone     Past Surgical History:   Procedure Laterality Date    BRONCHOSCOPY      LUNG BIOPSY  03/2017     Family History     Problem Relation (Age of Onset)    Allergies Mother, Brother    Cancer Mother    Depression Brother    Eczema Brother    Hyperlipidemia Mother    Melanoma Mother    Mental illness Sister    Obesity Brother    Other Father        Social History Main Topics    Smoking status: Never Smoker    Smokeless tobacco: Not on file    Alcohol use Yes      Comment: occasionally    Drug use: No    Sexual activity: Not on file       Review of Systems   Constitutional: Negative for activity change, appetite change, chills, diaphoresis, fatigue, fever and unexpected weight change.   HENT: Negative for congestion, dental problem, mouth sores, nosebleeds, postnasal drip, rhinorrhea, sinus pressure, sore throat and trouble swallowing.    Eyes: Negative for photophobia and visual disturbance.   Respiratory: Negative for cough and shortness of breath.    Cardiovascular:  Negative for chest pain, palpitations and leg swelling.   Gastrointestinal: Negative for abdominal distention, abdominal pain, blood in stool, constipation, diarrhea, nausea and vomiting.   Genitourinary: Negative for difficulty urinating, dysuria, frequency, hematuria, menstrual problem, urgency and vaginal bleeding.   Musculoskeletal: Negative for arthralgias, back pain and myalgias.   Skin: Negative for pallor and rash.   Allergic/Immunologic: Negative for immunocompromised state.   Neurological: Negative for dizziness, syncope, weakness, numbness and headaches.   Hematological: Negative for adenopathy. Does not bruise/bleed easily.   Psychiatric/Behavioral: Negative for confusion. The patient is not nervous/anxious.      Objective:     Vital Signs (Most Recent):  Temp: 98.4 °F (36.9 °C) (05/18/17 0910)  Pulse: 76 (05/18/17 0910)  Resp: 20 (05/18/17 0910)  BP: 117/73 (05/18/17 0910)  SpO2: 99 % (05/18/17 0910) Vital Signs (24h Range):  Temp:  [98.4 °F (36.9 °C)] 98.4 °F (36.9 °C)  Pulse:  [76] 76  Resp:  [20] 20  SpO2:  [99 %] 99 %  BP: (117)/(73) 117/73     Weight: 60.8 kg (134 lb)  Body mass index is 23.74 kg/(m^2).  Body surface area is 1.64 meters squared.    No intake or output data in the 24 hours ending 05/18/17 0946    Physical Exam   Constitutional: She is oriented to person, place, and time. She appears well-developed and well-nourished. No distress.   HENT:   Mouth/Throat: Oropharynx is clear and moist. No oropharyngeal exudate or posterior oropharyngeal erythema.   Eyes: Conjunctivae are normal. Pupils are equal, round, and reactive to light. No scleral icterus.   Neck: Normal range of motion. Neck supple. No thyromegaly present.   Cardiovascular: Normal rate, regular rhythm, normal heart sounds and intact distal pulses.    Pulmonary/Chest: Effort normal and breath sounds normal. No respiratory distress.   Abdominal: Soft. Bowel sounds are normal. She exhibits no distension. There is no splenomegaly  or hepatomegaly. There is no tenderness.   Musculoskeletal: Normal range of motion. She exhibits no edema or tenderness.   Lymphadenopathy:     She has no cervical adenopathy.     She has no axillary adenopathy.   Neurological: She is alert and oriented to person, place, and time. No cranial nerve deficit. Coordination normal.   Skin: No rash noted. No cyanosis. No pallor. Nails show no clubbing.   Psychiatric: She has a normal mood and affect. Thought content normal.   Vitals reviewed.      Significant Labs:   CBC:   Recent Labs  Lab 05/17/17  1358   WBC 12.45   HGB 13.2   HCT 40.1      , CMP:   Recent Labs  Lab 05/17/17  1358      K 4.1      CO2 16*   *   BUN 17   CREATININE 1.1   CALCIUM 9.7   PROT 7.8   ALBUMIN 3.8   BILITOT 0.4   ALKPHOS 50*   AST 16   ALT 11   ANIONGAP 22*   EGFRNONAA >60   , Haptoglobin: No results for input(s): HAPTOGLOBIN in the last 48 hours., Immunology: No results for input(s): SPEP, RAZ, AYAN, FREELAMBDALI in the last 48 hours., LDH: No results for input(s): LDHCSF, BFSOURCE in the last 48 hours., Tumor Markers: No results for input(s): PSA, CEA, , AFPTM, HD3385,  in the last 48 hours.    Invalid input(s): ALGTM and Uric Acid   Recent Labs  Lab 05/17/17  1358   URICACID 7.6*       Diagnostic Results:  I have reviewed all pertinent imaging results/findings within the past 24 hours.    Assessment/Plan:     Active Diagnoses:    Diagnosis Date Noted POA    Adenopathy [R59.1] 05/18/2017 Yes      Problems Resolved During this Admission:    Diagnosis Date Noted Date Resolved POA     22 year old female with no complaints today, will proceed with bone marrow aspiration and biopsy for evaluation of adenopathy and B symptoms. Consent obtained, risk vs benefits explained.    Birgit Castellanos NP  Hematology/Oncology  [facility]

## 2017-05-18 NOTE — DISCHARGE SUMMARY
[facility]  Hematology/Oncology  Discharge Summary      Patient Name: Donna Kumar  MRN: [id number]  Admission Date: 5/18/2017  Hospital Length of Stay: 0 days  Discharge Date and Time: 5/18/2017 11:17 AM  Attending Physician: Jeffery Wright MD   Discharging Provider: Birgit Castellanos NP  Primary Care Provider: Brooklynn Boyle MD    HPI:  Evaluation of adenopathy and B symptoms    Procedure(s) (LRB):  BIOPSY-BONE MARROW (Left)     Hospital Course:   Patient admitted to pre op today for a bone marrow aspiration and biopsy. Consent was obtained for a bone marrow biopsy. Patient was sedated per anesthesia and a bone marrow biopsy and aspiration was performed in the OR (see Op note). Patient was then transferred to post op and discharged home when appropriate per anesthesia.     Final Active Diagnoses:    Diagnosis Date Noted POA    PRINCIPAL PROBLEM:  Adenopathy [R59.1] 05/18/2017 Yes      Problems Resolved During this Admission:    Diagnosis Date Noted Date Resolved POA      Discharged Condition: good    Disposition: Home or Self Care    Follow Up: in 1 week at [facility] with Dr. Wright as planned    Patient Instructions:     Diet general     Activity as tolerated     Call MD for:  temperature >100.4     Call MD for:  redness, tenderness, or signs of infection (pain, swelling, redness, odor or green/yellow discharge around incision site)     Call MD for:  severe uncontrolled pain     Remove dressing in 24 hours       Medications:  Reconciled Home Medications:   Current Discharge Medication List      CONTINUE these medications which have NOT CHANGED    Details   cyanocobalamin (VITAMIN B-12) 1000 MCG tablet Take 1 tablet (1,000 mcg total) by mouth once daily.  Qty: 90 tablet, Refills: 3    Associated Diagnoses: Vitamin B 12 deficiency      ferrous sulfate 325 mg (65 mg iron) Tab tablet Take 1 tablet (325 mg total) by mouth daily with breakfast.  Qty: 90 tablet, Refills: 0    Associated  Diagnoses: Iron deficiency anemia due to chronic blood loss      fluoxetine 10 MG Tab Take 1 tablet (10 mg total) by mouth once daily.  Qty: 30 tablet, Refills: 3    Associated Diagnoses: Obsessive-compulsive disorder, unspecified type      predniSONE (DELTASONE) 20 MG tablet Take 40mg (two tablets) daily for two weeks then 20 mg daily until seen in clinic.  Qty: 50 tablet, Refills: 1      sulfacetamide sodium-sulfur (SULFACLEANSE 8-4) 8-4 % Susp Wash face qhs  Qty: 473 mL, Refills: 3    Associated Diagnoses: Acne vulgaris      ACZONE 5 % topical gel Apply topically every morning.  Qty: 60 g, Refills: 3    Associated Diagnoses: Acne vulgaris      hydrocodone-acetaminophen 5-325mg (NORCO) 5-325 mg per tablet Take 1 tablet by mouth every 4 (four) hours as needed for Pain.  Qty: 21 tablet, Refills: 0      norethindrone-ethinyl estradiol (OVCON) 0.4-35 mg-mcg per tablet Take 1 tablet by mouth once daily.  Qty: 30 tablet, Refills: 11    Associated Diagnoses: Encounter for contraceptive management, unspecified contraceptive encounter type      tazarotene (TAZORAC) 0.05 % Crea cream Apply topically every evening. Apply thin film to face qhs after moisturizing.  Qty: 60 g, Refills: 1    Associated Diagnoses: Acne vulgaris             Birgit Castellanos NP  Hematology/Oncology  [facility]

## 2017-05-18 NOTE — ANESTHESIA POSTPROCEDURE EVALUATION
"Anesthesia Post Evaluation    Patient: Donna Kumar    Procedure(s) Performed: Procedure(s) (LRB):  BIOPSY-BONE MARROW (Left)    Final Anesthesia Type: general  Patient location during evaluation: PACU  Patient participation: Yes- Able to Participate  Level of consciousness: awake and alert  Post-procedure vital signs: reviewed and stable  Pain management: adequate  Airway patency: patent  PONV status at discharge: No PONV  Anesthetic complications: no      Cardiovascular status: blood pressure returned to baseline and stable  Respiratory status: unassisted  Hydration status: euvolemic  Follow-up not needed.        Visit Vitals    /79 (BP Location: Left arm, Patient Position: Sitting, BP Method: Automatic)    Pulse 83    Temp 36.8 °C (98.3 °F) (Oral)    Resp 20    Ht 5' 3" (1.6 m)    Wt 60.8 kg (134 lb)    LMP 05/18/2017    SpO2 100%    Breastfeeding No    BMI 23.74 kg/m2       Pain/Cecilia Score: Pain Assessment Performed: Yes (5/18/2017 10:55 AM)  Presence of Pain: denies (5/18/2017 10:55 AM)  Modified Cecilia Score: 20 (5/18/2017 10:55 AM)      "

## 2017-05-18 NOTE — TRANSFER OF CARE
"Anesthesia Transfer of Care Note    Patient: Donna Kumar    Procedure(s) Performed: Procedure(s) (LRB):  BIOPSY-BONE MARROW (Left)    Patient location: [facility]    Anesthesia Type: MAC and general    Transport from OR: Transported from OR on room air with adequate spontaneous ventilation    Post pain: adequate analgesia    Post assessment: no apparent anesthetic complications    Post vital signs: stable    Level of consciousness: awake and alert    Nausea/Vomiting: no nausea/vomiting    Complications: none    Transfer of care protocol was followed      Last vitals:   Visit Vitals    /73 (BP Location: Left arm, Patient Position: Lying, BP Method: Automatic)    Pulse 76    Temp 36.9 °C (98.4 °F) (Oral)    Resp 20    Ht 5' 3" (1.6 m)    Wt 60.8 kg (134 lb)    LMP 05/18/2017    SpO2 99%    Breastfeeding No    BMI 23.74 kg/m2     "

## 2017-05-18 NOTE — IP AVS SNAPSHOT
[facility]  [address]  [facility] [address]  Phone: [phone]           Patient Discharge Instructions   Our goal is to set you up for success. This packet includes information on your condition, medications, and your home care.  It will help you care for yourself to prevent having to return to the hospital.     Please ask your nurse if you have any questions.      There are many details to remember when preparing to leave the hospital. Here is what you will need to do:    1. Take your medicine. If you are prescribed medications, review your Medication List on the following pages. You may have new medications to  at the pharmacy and others that you'll need to stop taking. Review the instructions for how and when to take your medications. Talk with your doctor or nurses if you are unsure of what to do.     2. Go to your follow-up appointments. Specific follow-up information is listed in the following pages. Your may be contacted by a nurse or clinical provider about future appointments. Be sure we have all of the phone numbers to reach you. Please contact your provider's office if you are unable to make an appointment.     3. Watch for warning signs. Your doctor or nurse will give you detailed warning signs to watch for and when to call for assistance. These instructions may also include educational information about your condition. If you experience any of warning signs to your health, call your doctor.           [facility]  Unless otherwise directed by your provider, please   contact [facility], our nurse care line   that is available for 24/7 assistance.     [phone] (toll-free)     Registered nurses in the [facility]   provide: appointment scheduling, clinical advisement, health education, and other advisory services.                  ** Verify the list of medication(s) below is accurate and up to date. Carry this with you in case of  emergency. If your medications have changed, please notify your healthcare provider.             Medication List      ASK your doctor about these medications        Additional Info                      ACZONE 5 % topical gel   Quantity:  60 g   Refills:  3   Generic drug:  dapsone    Instructions:  Apply topically every morning.     Begin Date    AM    Noon    PM    Bedtime       cyanocobalamin 1000 MCG tablet   Commonly known as:  VITAMIN B-12   Quantity:  90 tablet   Refills:  3   Dose:  1000 mcg    Instructions:  Take 1 tablet (1,000 mcg total) by mouth once daily.     Begin Date    AM    Noon    PM    Bedtime       ferrous sulfate 325 mg (65 mg iron) Tab tablet   Quantity:  90 tablet   Refills:  0   Dose:  325 mg    Instructions:  Take 1 tablet (325 mg total) by mouth daily with breakfast.     Begin Date    AM    Noon    PM    Bedtime       fluoxetine 10 MG Tab   Quantity:  30 tablet   Refills:  3   Dose:  10 mg    Instructions:  Take 1 tablet (10 mg total) by mouth once daily.     Begin Date    AM    Noon    PM    Bedtime       hydrocodone-acetaminophen 5-325mg 5-325 mg per tablet   Commonly known as:  NORCO   Quantity:  21 tablet   Refills:  0   Dose:  1 tablet    Instructions:  Take 1 tablet by mouth every 4 (four) hours as needed for Pain.     Begin Date    AM    Noon    PM    Bedtime       norethindrone-ethinyl estradiol 0.4-35 mg-mcg per tablet   Commonly known as:  OVCON   Quantity:  30 tablet   Refills:  11   Dose:  1 tablet    Instructions:  Take 1 tablet by mouth once daily.     Begin Date    AM    Noon    PM    Bedtime       predniSONE 20 MG tablet   Commonly known as:  DELTASONE   Quantity:  50 tablet   Refills:  1    Instructions:  Take 40mg (two tablets) daily for two weeks then 20 mg daily until seen in clinic.     Begin Date    AM    Noon    PM    Bedtime       sulfacetamide sodium-sulfur 8-4 % Susp   Commonly known as:  SULFACLEANSE 8-4   Quantity:  473 mL   Refills:  3    Instructions:  Wash  face qhs     Begin Date    AM    Noon    PM    Bedtime       tazarotene 0.05 % Crea cream   Commonly known as:  TAZORAC   Quantity:  60 g   Refills:  1    Instructions:  Apply topically every evening. Apply thin film to face qhs after moisturizing.     Begin Date    AM    Noon    PM    Bedtime                  Please bring to all follow up appointments:    1. A copy of your discharge instructions.  2. All medicines you are currently taking in their original bottles.  3. Identification and insurance card.    Please arrive 15 minutes ahead of scheduled appointment time.    Please call 24 hours in advance if you must reschedule your appointment and/or time.        Your Scheduled Appointments     May 24, 2017 10:45 AM CDT   Post OP with Moustapha Kaur MD   [facility] ([facility])    [address]  [place] [address] [phone]              Your Future Surgeries/Procedures     May 18, 2017   Surgery with Jeffery Wright MD   [facility] ([facility])    [address] [facility] [address] [phone]                  Discharge Instructions       Discharge instructions for having a Bone Marrow Aspiration / Biopsy    Keep Bandage in place for 24 hours.  - Do not shower or take a tube bath during this time. (you may sponge bathe).  - Call the nurse or physician for excessive bleeding or pain at biopsy site.  - You may take Tylenol as needed for pain.    You have received medication to sedate you.  - Do not drive a car or operate heavy machinery for the rest of the day.  - You may resume other activities as tolerated.    You can call [phone] for any problems during the hours of 8:00 AM-5:00PM.    For an emergency after 5:00 PM you can call [phone] and have the  page the Hematologist / Oncologist on call.       Admission Information     Date & Time Provider Department CSN    5/18/2017  8:36 AM Jeffery  "MD [facility] [id number]      Care Providers     Provider Role Specialty Primary office phone    Jeffery Wright MD Attending Provider Hematology and Oncology [phone]    Jeffery Wright MD Surgeon  Hematology and Oncology [phone]      Your Vitals Were     BP Pulse Temp Resp Height Weight    116/74 (BP Location: Left arm, Patient Position: Sitting, BP Method: Automatic) 87 98.3 °F (36.8 °C) (Oral) 20 5' 3" (1.6 m) 60.8 kg (134 lb)    Last Period SpO2 BMI          05/18/2017 100% 23.74 kg/m2        Recent Lab Values     No lab values to display.      Pending Labs     Order Current Status    Tissue Specimen to Pathology, Bone Marrow Aspiration/Biopsy Procedure Collected (05/18/17 0859)    Bone Marrow Prep and Stain In process    Chromosome Analysis, Bone Marrow In process    Heme Disorders DNA/RNA Hold, Bone Marrow In process    Iron Stain, Bone Marrow In process    Leukemia/Lymphoma Screen - Bone Marrow Left Posterior Iliac Crest In process      Allergies as of 5/18/2017        Reactions    No Known Drug Allergies       Advance Directives     An advance directive is a document which, in the event you are no longer able to make decisions for yourself, tells your healthcare team what kind of treatment you do or do not want to receive, or who you would like to make those decisions for you.  If you do not currently have an advance directive, [facility] encourages you to create one.  For more information call:  [phone]), [phone] ([phone]),  or log on to [email or web address].        Language Assistance Services     ATTENTION: Language assistance services are available, free of charge. Please call [phone].      ATENCIÓN: Si habla español, tiene a hebert disposición servicios gratuitos de asistencia lingüística. Llame al [phone].     CHÚ Ý: N?u b?n nói Ti?ng Vi?t, có các d?ch v? h? tr? ngôn ng? mi?n phí dành cho b?n. G?i s? " [phone].         [facility] complies with applicable Federal civil rights laws and does not discriminate on the basis of race, color, national origin, age, disability, or sex.

## 2017-05-18 NOTE — ANESTHESIA RELEASE NOTE
"Anesthesia Release from PACU Note    Patient: Donna Kumar    Procedure(s) Performed: Procedure(s) (LRB):  BIOPSY-BONE MARROW (Left)    Anesthesia type: GEN    Post pain: Adequate analgesia reported    Post assessment: no apparent anesthetic complications, tolerated procedure well and no evidence of recall    Post vital signs: BP (!) 149/91 (BP Location: Left arm, Patient Position: Sitting, BP Method: Automatic)  Pulse 74  Temp 36.8 °C (98.3 °F) (Oral)   Resp 20  Ht 5' 3" (1.6 m)  Wt 60.8 kg (134 lb)  LMP 05/18/2017 Comment: upt negative  SpO2 100%  Breastfeeding? No  BMI 23.74 kg/m2    Level of consciousness: awake, alert and oriented    Nausea/Vomiting: no nausea/no vomiting    Complications: none    Airway Patency: patent    Respiratory: unassisted, spontaneous ventilation, room air    Cardiovascular: stable and blood pressure at baseline    Hydration: euvolemic    "

## 2017-05-18 NOTE — PLAN OF CARE
Patient and family given discharge instructions.  Patient verbalized understanding of instructions.  All questions answered.  Patient tolerated clear liquid diet well. Patient VSS.  Called DR. Chu for an anesthesia release. Patient getting dressed and ready for discharge.

## 2017-05-19 ENCOUNTER — TELEPHONE (OUTPATIENT)
Dept: HEMATOLOGY/ONCOLOGY | Facility: CLINIC | Age: 22
End: 2017-05-19

## 2017-05-19 LAB
ACID FAST MOD KINY STN SPEC: NORMAL
BODY SITE - BONE MARROW: NORMAL
CLINICAL DIAGNOSIS - BONE MARROW: NORMAL
FLOW CYTOMETRY ANTIBODIES ANALYZED - BONE MARROW: NORMAL
FLOW CYTOMETRY COMMENT - BONE MARROW: NORMAL
FLOW CYTOMETRY INTERPRETATION - BONE MARROW: NORMAL
MYCOBACTERIUM SPEC QL CULT: NORMAL

## 2017-05-20 LAB
DNA/RNA EXTRACT AND HOLD RESULT: NORMAL
DNA/RNA EXTRACTION: NORMAL
EXHR SPECIMEN TYPE: NORMAL

## 2017-05-22 ENCOUNTER — TELEPHONE (OUTPATIENT)
Dept: DERMATOLOGY | Facility: CLINIC | Age: 22
End: 2017-05-22

## 2017-05-22 DIAGNOSIS — R59.0 MEDIASTINAL ADENOPATHY: Primary | ICD-10-CM

## 2017-05-22 LAB — BONE MARROW IRON STAIN COMMENT: NORMAL

## 2017-05-22 NOTE — TELEPHONE ENCOUNTER
----- Message from Sophie Clemens sent at 5/22/2017  2:10 PM CDT -----  Contact: PT  PT needs to be seen as soon as possible.  PT has a cyst on her forehead and it's getting bigger.    PT callback: [phone]

## 2017-05-23 ENCOUNTER — HOSPITAL ENCOUNTER (OUTPATIENT)
Dept: RADIOLOGY | Facility: HOSPITAL | Age: 22
Discharge: HOME | End: 2017-05-23
Attending: INTERNAL MEDICINE
Payer: COMMERCIAL

## 2017-05-23 ENCOUNTER — OFFICE VISIT (OUTPATIENT)
Dept: DERMATOLOGY | Facility: CLINIC | Age: 22
End: 2017-05-23
Payer: COMMERCIAL

## 2017-05-23 DIAGNOSIS — R59.9 ADENOPATHY: ICD-10-CM

## 2017-05-23 DIAGNOSIS — L72.0 EIC (EPIDERMAL INCLUSION CYST): Primary | ICD-10-CM

## 2017-05-23 PROCEDURE — 78815 PET IMAGE W/CT SKULL-THIGH: CPT | Mod: 26,PS,, | Performed by: RADIOLOGY

## 2017-05-23 PROCEDURE — A9552 F18 FDG: HCPCS

## 2017-05-23 PROCEDURE — 99213 OFFICE O/P EST LOW 20 MIN: CPT | Mod: S$GLB,,, | Performed by: DERMATOLOGY

## 2017-05-23 PROCEDURE — 99999 PR PBB SHADOW E&M-EST. PATIENT-LVL II: CPT | Mod: PBBFAC,,, | Performed by: DERMATOLOGY

## 2017-05-23 NOTE — PROGRESS NOTES
Subjective:       Patient ID:  Donna Kumar is a 22 y.o. female who presents for   Chief Complaint   Patient presents with    Cyst     left forehead x 3-4 months, larger not painful not movable no prior tx     Cyst  - Initial  Affected locations: forehead  Duration: 4 months  Signs / symptoms: growing  Aggravated by: nothing  Relieving factors/Treatments tried: nothing        Review of Systems   Skin: Negative for itching and rash.   Hematologic/Lymphatic: Positive for adenopathy (in chest - currently on pred x 1 month ). Does not bruise/bleed easily.        Objective:    Physical Exam   Constitutional: She appears well-developed and well-nourished. No distress.   Neurological: She is alert and oriented to person, place, and time. She is not disoriented.   Psychiatric: She has a normal mood and affect.   Skin:   Areas Examined (abnormalities noted in diagram):   Head / Face Inspection Performed              Diagram Legend     Erythematous scaling macule/papule c/w actinic keratosis       Vascular papule c/w angioma      Pigmented verrucoid papule/plaque c/w seborrheic keratosis      Yellow umbilicated papule c/w sebaceous hyperplasia      Irregularly shaped tan macule c/w lentigo     1-2 mm smooth white papules consistent with Milia      Movable subcutaneous cyst with punctum c/w epidermal inclusion cyst      Subcutaneous movable cyst c/w pilar cyst      Firm pink to brown papule c/w dermatofibroma      Pedunculated fleshy papule(s) c/w skin tag(s)      Evenly pigmented macule c/w junctional nevus     Mildly variegated pigmented, slightly irregular-bordered macule c/w mildly atypical nevus      Flesh colored to evenly pigmented papule c/w intradermal nevus       Pink pearly papule/plaque c/w basal cell carcinoma      Erythematous hyperkeratotic cursted plaque c/w SCC      Surgical scar with no sign of skin cancer recurrence      Open and closed comedones      Inflammatory papules and pustules      Verrucoid  papule consistent consistent with wart     Erythematous eczematous patches and plaques     Dystrophic onycholytic nail with subungual debris c/w onychomycosis     Umbilicated papule    Erythematous-base heme-crusted tan verrucoid plaque consistent with inflamed seborrheic keratosis     Erythematous Silvery Scaling Plaque c/w Psoriasis     See annotation          Assessment / Plan:        EIC (epidermal inclusion cyst) - left forehead  -     Ambulatory referral to Plastic Surgery - [facility]             Return if symptoms worsen or fail to improve.

## 2017-05-24 ENCOUNTER — LAB VISIT (OUTPATIENT)
Dept: LAB | Facility: HOSPITAL | Age: 22
End: 2017-05-24
Attending: ANESTHESIOLOGY
Payer: COMMERCIAL

## 2017-05-24 ENCOUNTER — ANESTHESIA EVENT (OUTPATIENT)
Dept: SURGERY | Facility: HOSPITAL | Age: 22
DRG: 804 | End: 2017-05-24
Payer: COMMERCIAL

## 2017-05-24 ENCOUNTER — TELEPHONE (OUTPATIENT)
Dept: PLASTIC SURGERY | Facility: CLINIC | Age: 22
End: 2017-05-24

## 2017-05-24 ENCOUNTER — OFFICE VISIT (OUTPATIENT)
Dept: HEMATOLOGY/ONCOLOGY | Facility: CLINIC | Age: 22
End: 2017-05-24
Attending: INTERNAL MEDICINE
Payer: COMMERCIAL

## 2017-05-24 ENCOUNTER — OFFICE VISIT (OUTPATIENT)
Dept: CARDIOTHORACIC SURGERY | Facility: CLINIC | Age: 22
End: 2017-05-24
Payer: COMMERCIAL

## 2017-05-24 VITALS
HEIGHT: 63 IN | TEMPERATURE: 98 F | SYSTOLIC BLOOD PRESSURE: 123 MMHG | HEART RATE: 80 BPM | BODY MASS INDEX: 23.36 KG/M2 | OXYGEN SATURATION: 100 % | DIASTOLIC BLOOD PRESSURE: 83 MMHG | WEIGHT: 131.81 LBS

## 2017-05-24 VITALS
BODY MASS INDEX: 22.02 KG/M2 | HEART RATE: 87 BPM | HEIGHT: 65 IN | SYSTOLIC BLOOD PRESSURE: 123 MMHG | WEIGHT: 132.19 LBS | OXYGEN SATURATION: 100 % | TEMPERATURE: 98 F | RESPIRATION RATE: 18 BRPM | DIASTOLIC BLOOD PRESSURE: 89 MMHG

## 2017-05-24 DIAGNOSIS — Z01.818 PREOP TESTING: ICD-10-CM

## 2017-05-24 DIAGNOSIS — R59.0 MEDIASTINAL LYMPHADENOPATHY: Primary | ICD-10-CM

## 2017-05-24 DIAGNOSIS — Z01.818 PREOP TESTING: Primary | ICD-10-CM

## 2017-05-24 DIAGNOSIS — R59.0 MEDIASTINAL ADENOPATHY: Primary | ICD-10-CM

## 2017-05-24 LAB
ABO + RH BLD: NORMAL
BLD GP AB SCN CELLS X3 SERPL QL: NORMAL

## 2017-05-24 PROCEDURE — 99024 POSTOP FOLLOW-UP VISIT: CPT | Mod: S$GLB,,, | Performed by: THORACIC SURGERY (CARDIOTHORACIC VASCULAR SURGERY)

## 2017-05-24 PROCEDURE — 99999 PR PBB SHADOW E&M-EST. PATIENT-LVL III: CPT | Mod: PBBFAC,,, | Performed by: THORACIC SURGERY (CARDIOTHORACIC VASCULAR SURGERY)

## 2017-05-24 PROCEDURE — 99213 OFFICE O/P EST LOW 20 MIN: CPT | Mod: S$GLB,,, | Performed by: INTERNAL MEDICINE

## 2017-05-24 PROCEDURE — 86900 BLOOD TYPING SEROLOGIC ABO: CPT

## 2017-05-24 PROCEDURE — 99999 PR PBB SHADOW E&M-EST. PATIENT-LVL III: CPT | Mod: PBBFAC,,, | Performed by: INTERNAL MEDICINE

## 2017-05-24 PROCEDURE — 36415 COLL VENOUS BLD VENIPUNCTURE: CPT

## 2017-05-24 PROCEDURE — 86850 RBC ANTIBODY SCREEN: CPT

## 2017-05-24 NOTE — ANESTHESIA PREPROCEDURE EVALUATION
Anesthesia Assessment: Preoperative EQUATION    Planned Procedure: Procedure(s) (LRB):  THORACOSCOPY-VIDEO ASSISTED (VATS) (Right)  DISSECTION-LYMPH NODE (Right)  Requested Anesthesia Type:General  Surgeon: Moustapha Kaur MD  Service: Thoracic  Known or anticipated Date of Surgery:5/29/2017    Surgeon notes: reviewed and Mediastinal adenopathy    Electronic QUestionnaire Assessment completed via nurse interview with patient.        Donna Kumar [[id number]] - 22 y.o. Female     Providers Outside of [facility]     No data to display   Surgical Risk Level     Surgical Risk Level:  3       caRDScore (Clinical Anesthesia Rapid Decision Score)     Very Very Low   Total Score: 5    5 Sum of Clinical Scores   caRDScores (Grouped)     caRDScore - Ane:  4            caRDScore - CVD:  0            caRDScore - Pul:  0            caRDScore - Met:  1            caRDScore - Phy:  0       caRDScore Items     Flowsheet Row Pre-admit from 5/29/2017 in [facility] Admission (Discharged) from 5/11/2017 in [facility]   Anesthesia   Oral or IM for chronic condition (steroid dependent- Why in comment) Yes [inflammation-on steriod for 1.5 wks at present]    Yes [inflammation-on steriod for 1.5 wks at present]   Has trouble swallowing / handling saliva Yes [sometimes] Yes [mild-sometimes]   CVD   Activity similar to best ability for maximal activity or exercise METS 4 METS 4   Pulmonary   Metabolic   Has / Has had Dx of schizophrenia, OCD, Other disease Rxed by psychiatrist Yes Yes   Physiologic   Flags     Red Flag Score:  0            Yellow Flag Score:  4       Red Flags     No data to display   Yellow Flags     Flowsheet Row Pre-admit from 5/29/2017 in [facility] Admission (Discharged) from 5/11/2017 in [facility]   Has had surgery within the last 3 months Yes Yes   Has had steroids in the last year Yes Yes   Takes herbal medications or vitamin  supplements Yes[will stop x7 days prior to surgery] Yes [will stop x7 days prior to surgery]   Has Iron Deficiency Anemia Yes No data   Has / Has had Dx of schizophrenia, OCD, Other disease Rxed by psychiatrist Yes Yes   PONV Risk Score (assumes periop narcotic use = +1, Max=4)     PONV Risk Score:  3       PONV Risk Factors   Total Score: 2            Sleep Apnea   Total Score: 0    KRISTIAN STOP-Bang Risk Factors (Max=8)   Total Score: 0    KRISTIAN Risk Level - 1 (Low), 2 (Moderate), 3 (High)     KRISTIAN Risk Level:  0       RCRI (Revised Cardiac Risk Indices of ACC/AHA guidelines, Max=6)   Total Score: 1    1 Patient is having an intra-abdominal thoracic or major vascular case   CAD Risk Factors   Total Score: 1        CHADS Score if applicable (history of atrial fib/flutter, Max=6)   Total Score: 0    Maximal Exercise Capacity     Flowsheet Row Pre-admit from 5/29/2017 in [facility]   Maximal Exercise Capacity METS 4   Summary of Dependence   Total Score: 1    1 Is totally independent of others for activities of daily living   Phone Fraility Score (Max = 17)   Total Score: 1    1 Uses 5 or more meds on reg basis   Pain Factors     No data to display   Risk Triggers (Evidence-Based Risk Triggers)     Pulmonary Risk Triggers   Total Score: 1        Renal Risk Triggers   Total Score: 0    Delirium Risk Triggers   Total Score: 0    Urologic Risk Triggers   Total Score: 0    Logistics     Pre-op Clinic Logistics   Total Score: 9                            [facility] Logistics   Total Score: 3            Discharge Logistics   Total Score: 1        Discharge Planning     Flowsheet Row Pre-admit from 5/29/2017 in [facility] Admission (Discharged) from 5/11/2017 in [facility]   Discharge Planning   Will assist patient 24/7, if needed -- [mother-Brittany] -- [parents]   Anes <For office use only>     Total Score: 8      Surgical Risk Level Assessment             Triage  considerations:     The patient has no apparent active cardiac condition (No unstable coronary Syndrome such as severe unstable angina or recent [<1 month] myocardial infarction, decompensated CHF, severe valvular   disease or significant arrhythmia)    Previous anesthesia records:5/18/17-Biopsy-Bone Marrow-Lefr-General-Airway/Jaw/Neck:  Airway Findings: Mouth Opening: Normal Tongue: Normal  General Airway Assessment: Adult  Mallampati: II  Improves to II with phonation.  TM Distance: Normal, at least 6 cm  Jaw/Neck Findings-No PONV-no apparent anesthetic complications    Anesthesia Hx:  No problems with previous Anesthesia      Last PCP note: within 3 months , within [facility] , Annual Exam  Subspecialty notes: Hematology/Oncology, Pulmonary, Dermatology/Infectious Disease/Obstetrics and Gynecology    Other important co-morbidities: B12 deficiency/Iron deficiency/Mastocytosis/Obsessive compulsive disorder     Tests already available:  Results have been reviewed.Labs-5/18/17-POCT urine pregnancy test/5/17/17-Uric acid/CBC/CMP/  5/11/17-UA/ 4/13/17-Iron & TIBC/C-Reactive Protein/Sed Rate, manual/AYAN/ 3/17/17-Donny -Barr virus antibody panel/ 3/16/17-Vitamin B12/Iron and TIBC/CBC/CXR 1 view-5/11/17            Instructions given. (See in Nurse's note)    Optimization:  Phone -Reviewed with Dr. Jaimes (anesthesia)-No POC needed at this time  Plan:    Testing:  T&S      Patient  has previously scheduled Medical Appointment:None    Navigation: Tests Scheduled. Lab-T&S on 5/24/17 @ 12:05p                        Results will be tracked by Preop Clinic.   Addendum:Lab-T&S done and reviewed.               Janis Esposito, RN  5/24/17 05/24/2017  Donna Kumar is a 22 y.o., female.    Anesthesia Evaluation         Review of Systems      Physical Exam  General:  Well nourished    Airway/Jaw/Neck:  Airway Findings:  Mouth Opening: Normal Tongue: Normal  General Airway Assessment: Adult  Mallampati: II  Improves to II, I with phonation.  TM Distance: Normal, at least 6 cm  Jaw/Neck Findings:  Neck ROM: Normal ROM       Chest/Lungs:  Chest/Lungs Findings: Normal Respiratory Rate     Heart/Vascular:  Heart Findings: Rate: Normal        Mental Status:  Mental Status Findings:  Alert and Oriented         Anesthesia Plan  Type of Anesthesia, risks & benefits discussed:  Anesthesia Type:  general  Patient's Preference: General   Intra-op Monitoring Plan: standard ASA monitors  Intra-op Monitoring Plan Comments:   Post Op Pain Control Plan: multimodal analgesia and IV/PO Opiods PRN  Post Op Pain Control Plan Comments:   Induction:   IV  Beta Blocker:  Patient is not currently on a Beta-Blocker (No further documentation required).       Informed Consent: Patient understands risks and agrees with Anesthesia plan.  Questions answered. Anesthesia consent signed with patient.  ASA Score: 2     Day of Surgery Review of History & Physical:    H&P update referred to the surgeon.     Anesthesia Plan Notes: NPO confirmed.   Mild nausea previously.  GARETT but no a-line per discussions with Dr. Kaur.          Ready For Surgery From Anesthesia Perspective.

## 2017-05-24 NOTE — TELEPHONE ENCOUNTER
Called pt to schedule a consult for forehead cyst per Dr. Sanchez's referral. No answer. Left a voice message.

## 2017-05-24 NOTE — PROGRESS NOTES
Subjective:       Patient ID: Donna Kumar is a 22 y.o. female.    Chief Complaint: No chief complaint on file.    HPI   Miss Kumar returns today for follow up.  Her bone marrow biopsy was negative, and her PET scan showed uptake in the mediastinum and right  hilum only.  There was no uptake elsewhere.    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  An attempt at mediastinoscopy was unsuccessful.  She is scheduled to see Dr. Kaur late today and discuss with him what the next diagnostic procedure will be.      Review of Systems      Overall she feels well.   She no longer has night sweats and states that they resolved once she was started on steroids by Dr. Kim in late April.  Her current dose of prednisone is 30 mg daily.  She denies any anxiety, depression, easy bruising, fevers, chills, night  sweats, weight loss, nausea, vomiting, diarrhea, constipation, diplopia, blurred vision, headache, chest pain, palpitations, shortness of breath, breast pain, abdominal pain, extremity pain, or difficulty ambulating.  The remainder of the ten-point ROS, including general, skin, lymph, H/N, cardiorespiratory, GI, , Neuro, Endocrine, and psychiatric is negative.     Objective:      Physical Exam    Deferred    Assessment:       1. Adenopathy            Plan:        I had a long discussion with Miss Kumar and her mother.  I suggested they follow up with Dr. Kaur and discuss with him the upcoming procedure. I have also discussed her case with Dr. Rand, and we will have an HIV test drawn prior to her surgery.  I will see her in two weeks from today.  \Her questions were answered to her satisfaction.  Duration of visit was 15 minutes.

## 2017-05-24 NOTE — PROGRESS NOTES
Subjective:       Patient ID: Donna Kumar is a 22 y.o. female.    Chief Complaint: No chief complaint on file.    HPI   21 y.o. female presents with PMH of mediastinal lymphadenopathy first identified in March 2017. Reports 6 month history of night sweats, chest tightness and pressure, dysphagia and SOB. She was prescribed a Z pack and then a course of Levaquin but symptoms persisted. She was referred to Dr. Kim and eventually underwent a bronch/EBUS on 3/17/17. Pathology showed acute and chronic granulomatous inflammation. Fungal and mycobacterial studies negative. On repeat chest CT the mediastinal lymphadenopathy persisted. Recently prescribed steroids which she reports has improved dysphagia and SOB.      PSH negative. Never smoker. She is in college in [place] and very active.     Interval History:   Mediastinoscopy was non diagnostic. Secondary to difficulty entering the mediastinum which had mediastinal fibrosis. Splenic US in the PACU revealed stable splenic cyst. Uncomplicated post-operative course.   Following procedure was seen by Dr. Wright and underwent bone marrow biopsy which was benign. PET/CT on 5/23/17 significant for mediastinal and hilar adenopathy uptake with remaining normal physicological uptake. Today, here to discuss other means of surgical biopsy. Reports night sweats have largely resolved. Currently taking prednisone 30mg. Denies fever, chills, fatigue, wt loss, appetite changes, cough, SOB, CP.     Review of Systems   Constitutional: Negative for activity change, appetite change, chills, diaphoresis, fatigue and fever.   HENT: Negative for congestion.    Eyes: Negative for pain.   Respiratory: Negative for cough, shortness of breath and wheezing.    Cardiovascular: Negative for chest pain and palpitations.   Gastrointestinal: Negative for abdominal pain, nausea and vomiting.   Endocrine: Negative for cold intolerance and heat intolerance.   Genitourinary: Negative for  difficulty urinating.   Skin: Negative for rash.   Neurological: Negative for syncope and headaches.   Hematological: Negative for adenopathy.   Psychiatric/Behavioral: Negative for confusion.         Objective:      Physical Exam   Constitutional: She is oriented to person, place, and time. She appears well-developed and well-nourished. No distress.   HENT:   Head: Normocephalic and atraumatic.   Eyes: EOM are normal.   Neck: Normal range of motion. Neck supple.   Collar incision healing erythema. No surrounding erythema or drainage.    Cardiovascular: Normal rate, regular rhythm and normal heart sounds.    Pulmonary/Chest: Effort normal and breath sounds normal. No respiratory distress. She has no wheezes. She exhibits no tenderness.   Abdominal: Soft. She exhibits no distension.   Lymphadenopathy:     She has no cervical adenopathy.   Neurological: She is alert and oriented to person, place, and time.   Skin: Skin is warm and dry.   Psychiatric: She has a normal mood and affect.   Vitals reviewed.        Pathology 5/11/17:  Right mainstem bronchus, biopsy:   Benign bronchial mucosa with underlying necrotizing granulomatous type inflammation.   Special stains (AFB and GMS) for organisms are pending and the results will be issued in    Bone Marrow Biopsy 5/18/17:   BONE MARROW ASPIRATE SMEARS, TOUCH IMPRINTS, CORE BIOPSY, LEFT ILIAC CREST, AND CLOT  SECTION:   Normocellular bone marrow with trilineage hematopoiesis and no morphologic or immunophenotypic evidence of a  hematolymphoid neoplasm, see comment.    Splenic US 5/11/17:  Minimally complex, sonographically nonspecific cystic structure within the mid body of the spleen measuring 1.8 x 1.6 x 1.4 cm, grossly unchanged compared to prior CT.  Two additional nonspecific subcentimeter cystic lesions within the spleen.    PET/CT 5/23/17:  There is right supraclavicular, mediastinal, and right hilar lymphadenopathy.  Subcarinal index lesion SUV max 11.71.  Right  hilar SUV max 9.55.  There is physiologic intracranial activity.  There is physiologic neck activity.  No significant axillary lymph nodes are seen and no primary breast lesion is seen.  There is physiologic liver, spleen, GI, and  activity.  No bone lesions are seen.    Assessment:       21 year old female with PET avid mediastinal/hilar lymphadenopathy here today to discuss surgical biopsy. BM biopsy normal. Nonspecific splenic cyst, grossly unchanged from previous imaging.     Plan:       Will plan for Right VATS with mediastinal lymph node biopsy on 5/29/17.  Appropriate patient education regarding the magalis-operative period as well as intraperative details were discussed. Risks, including but not limited to, bleeding, infection, pain and anesthetic complication were discussed. Patient was given the opportunity to ask questions and to have those questions answered to their satisfaction. Patient verbalized understanding to both procedure and associated risks. Consent was obtained.      ATTENDING ATTESTATION:    I evaluated the patient and I agree with the assessment and plan.  I recommend a RVATS mediastinal exploration with LN biopsy for diagnostic purposes.  I recommend VATS because less invasive procedures have failed to generate a diagnosis.  I have discussed the technical aspects, risks and benefits of the procedure with the patient.  I did inform the patient that the risks are the most common risks and that there are other less likely risks that are too numerous to elaborate.  The patient is aware and has agreed to undergo the procedure as detailed on the consent form.

## 2017-05-24 NOTE — PRE ADMISSION SCREENING
Anesthesia Assessment: Preoperative EQUATION    Planned Procedure: Procedure(s) (LRB):  THORACOSCOPY-VIDEO ASSISTED (VATS) (Right)  DISSECTION-LYMPH NODE (Right)  Requested Anesthesia Type:General  Surgeon: Moustapha Kaur MD  Service: Thoracic  Known or anticipated Date of Surgery:5/29/2017    Surgeon notes: reviewed and Mediastinal adenopathy    Electronic QUestionnaire Assessment completed via nurse interview with patient.        Donna Kumar [[id number]] - 22 y.o. Female     Providers Outside of [facility]     No data to display   Surgical Risk Level     Surgical Risk Level:  3       caRDScore (Clinical Anesthesia Rapid Decision Score)     Very Very Low   Total Score: 5    5 Sum of Clinical Scores   caRDScores (Grouped)     caRDScore - Ane:  4            caRDScore - CVD:  0            caRDScore - Pul:  0            caRDScore - Met:  1            caRDScore - Phy:  0       caRDScore Items     Flowsheet Row Pre-admit from 5/29/2017 in [facility] Admission (Discharged) from 5/11/2017 in [facility]   Anesthesia   Oral or IM for chronic condition (steroid dependent- Why in comment) Yes [inflammation-on steriod for 1.5 wks at present]    Yes [inflammation-on steriod for 1.5 wks at present]   Has trouble swallowing / handling saliva Yes [sometimes] Yes [mild-sometimes]   CVD   Activity similar to best ability for maximal activity or exercise METS 4 METS 4   Pulmonary   Metabolic   Has / Has had Dx of schizophrenia, OCD, Other disease Rxed by psychiatrist Yes Yes   Physiologic   Flags     Red Flag Score:  0            Yellow Flag Score:  4       Red Flags     No data to display   Yellow Flags     Flowsheet Row Pre-admit from 5/29/2017 in [facility] Admission (Discharged) from 5/11/2017 in [facility]   Has had surgery within the last 3 months Yes Yes   Has had steroids in the last year Yes Yes   Takes herbal medications or vitamin  supplements Yes[will stop x7 days prior to surgery] Yes [will stop x7 days prior to surgery]   Has Iron Deficiency Anemia Yes No data   Has / Has had Dx of schizophrenia, OCD, Other disease Rxed by psychiatrist Yes Yes   PONV Risk Score (assumes periop narcotic use = +1, Max=4)     PONV Risk Score:  3       PONV Risk Factors   Total Score: 2            Sleep Apnea   Total Score: 0    KRISTIAN STOP-Bang Risk Factors (Max=8)   Total Score: 0    KRISTIAN Risk Level - 1 (Low), 2 (Moderate), 3 (High)     KRISTIAN Risk Level:  0       RCRI (Revised Cardiac Risk Indices of ACC/AHA guidelines, Max=6)   Total Score: 1    1 Patient is having an intra-abdominal thoracic or major vascular case   CAD Risk Factors   Total Score: 1        CHADS Score if applicable (history of atrial fib/flutter, Max=6)   Total Score: 0    Maximal Exercise Capacity     Flowsheet Row Pre-admit from 5/29/2017 in [facility]   Maximal Exercise Capacity METS 4   Summary of Dependence   Total Score: 1    1 Is totally independent of others for activities of daily living   Phone Fraility Score (Max = 17)   Total Score: 1    1 Uses 5 or more meds on reg basis   Pain Factors     No data to display   Risk Triggers (Evidence-Based Risk Triggers)     Pulmonary Risk Triggers   Total Score: 1        Renal Risk Triggers   Total Score: 0    Delirium Risk Triggers   Total Score: 0    Urologic Risk Triggers   Total Score: 0    Logistics     Pre-op Clinic Logistics   Total Score: 9                            [facility]OS[facility] Logistics   Total Score: 3            Discharge Logistics   Total Score: 1        Discharge Planning     Flowsheet Row Pre-admit from 5/29/2017 in [facility] Admission (Discharged) from 5/11/2017 in [facility]   Discharge Planning   Will assist patient 24/7, if needed -- [mother-Brittany] -- [parents]   Anes <For office use only>     Total Score: 8      Surgical Risk Level Assessment             Triage  considerations:     The patient has no apparent active cardiac condition (No unstable coronary Syndrome such as severe unstable angina or recent [<1 month] myocardial infarction, decompensated CHF, severe valvular   disease or significant arrhythmia)    Previous anesthesia records:5/18/17-Biopsy-Bone Marrow-Lefr-General-Airway/Jaw/Neck:  Airway Findings: Mouth Opening: Normal Tongue: Normal  General Airway Assessment: Adult  Mallampati: II  Improves to II with phonation.  TM Distance: Normal, at least 6 cm  Jaw/Neck Findings-No PONV-no apparent anesthetic complications    Anesthesia Hx:  No problems with previous Anesthesia      Last PCP note: within 3 months , within [facility] , Annual Exam  Subspecialty notes: Hematology/Oncology, Pulmonary, Dermatology/Infectious Disease/Obstetrics and Gynecology    Other important co-morbidities: B12 deficiency/Iron deficiency/Mastocytosis/Obsessive compulsive disorder     Tests already available:  Results have been reviewed.Labs-5/18/17-POCT urine pregnancy test/5/17/17-Uric acid/CBC/CMP/  5/11/17-UA/ 4/13/17-Iron & TIBC/C-Reactive Protein/Sed Rate, manual/AYAN/ 3/17/17-Donny -Barr virus antibody panel/ 3/16/17-Vitamin B12/Iron and TIBC/CBC/CXR 1 view-5/11/17            Instructions given. (See in Nurse's note)    Optimization:  Phone -Reviewed with Dr. Jaimes (anesthesia)-No POC needed at this time  Plan:    Testing:  T&S      Patient  has previously scheduled Medical Appointment:None    Navigation: Tests Scheduled. Lab-T&S on 5/24/17 @ 12;05p                        Results will be tracked by Preop Clinic.                Janis Esposito RN  5/24/17

## 2017-05-25 ENCOUNTER — PATIENT MESSAGE (OUTPATIENT)
Dept: PULMONOLOGY | Facility: CLINIC | Age: 22
End: 2017-05-25

## 2017-05-26 ENCOUNTER — LAB VISIT (OUTPATIENT)
Dept: LAB | Facility: HOSPITAL | Age: 22
End: 2017-05-26
Attending: INTERNAL MEDICINE
Payer: COMMERCIAL

## 2017-05-26 ENCOUNTER — TELEPHONE (OUTPATIENT)
Dept: INFECTIOUS DISEASES | Facility: CLINIC | Age: 22
End: 2017-05-26

## 2017-05-26 ENCOUNTER — TELEPHONE (OUTPATIENT)
Dept: CARDIOTHORACIC SURGERY | Facility: CLINIC | Age: 22
End: 2017-05-26

## 2017-05-26 DIAGNOSIS — R59.9 GRANULOMATOUS ADENOPATHY: Primary | ICD-10-CM

## 2017-05-26 DIAGNOSIS — R59.9 GRANULOMATOUS ADENOPATHY: ICD-10-CM

## 2017-05-26 LAB
CHROM BANDING METHOD: NORMAL
CHROMOSOME ANALYSIS BM ADDITIONAL INFORMATION: NORMAL
CHROMOSOME ANALYSIS BM RELEASED BY: NORMAL
CHROMOSOME ANALYSIS BM RESULT SUMMARY: NORMAL
CLINICAL CYTOGENETICIST REVIEW: NORMAL
KARYOTYP MAR: NORMAL
REASON FOR REFERRAL (NARRATIVE): NORMAL
REF LAB TEST METHOD: NORMAL
SPECIMEN SOURCE: NORMAL
SPECIMEN: NORMAL

## 2017-05-26 PROCEDURE — 87799 DETECT AGENT NOS DNA QUANT: CPT

## 2017-05-26 PROCEDURE — 86703 HIV-1/HIV-2 1 RESULT ANTBDY: CPT

## 2017-05-26 PROCEDURE — 86790 VIRUS ANTIBODY NOS: CPT

## 2017-05-26 PROCEDURE — 36415 COLL VENOUS BLD VENIPUNCTURE: CPT

## 2017-05-29 ENCOUNTER — HOSPITAL ENCOUNTER (INPATIENT)
Facility: HOSPITAL | Age: 22
LOS: 1 days | Discharge: HOME | DRG: 804 | End: 2017-05-30
Attending: THORACIC SURGERY (CARDIOTHORACIC VASCULAR SURGERY) | Admitting: THORACIC SURGERY (CARDIOTHORACIC VASCULAR SURGERY)
Payer: COMMERCIAL

## 2017-05-29 ENCOUNTER — ANESTHESIA (OUTPATIENT)
Dept: SURGERY | Facility: HOSPITAL | Age: 22
DRG: 804 | End: 2017-05-29
Payer: COMMERCIAL

## 2017-05-29 DIAGNOSIS — R59.9 ADENOPATHY: ICD-10-CM

## 2017-05-29 DIAGNOSIS — F42.9 OBSESSIVE-COMPULSIVE DISORDER, UNSPECIFIED TYPE: ICD-10-CM

## 2017-05-29 DIAGNOSIS — R59.0 MEDIASTINAL LYMPHADENOPATHY: Primary | ICD-10-CM

## 2017-05-29 LAB
ANION GAP SERPL CALC-SCNC: 14 MMOL/L
ANION GAP SERPL CALC-SCNC: 14 MMOL/L
ANISOCYTOSIS BLD QL SMEAR: SLIGHT
B-HCG UR QL: NEGATIVE
BASOPHILS # BLD AUTO: 0.02 K/UL
BASOPHILS NFR BLD: 0.1 %
BUN SERPL-MCNC: 10 MG/DL
BUN SERPL-MCNC: 10 MG/DL
BURR CELLS BLD QL SMEAR: ABNORMAL
CALCIUM SERPL-MCNC: 7.2 MG/DL
CALCIUM SERPL-MCNC: 7.2 MG/DL
CHLORIDE SERPL-SCNC: 108 MMOL/L
CHLORIDE SERPL-SCNC: 108 MMOL/L
CO2 SERPL-SCNC: 15 MMOL/L
CO2 SERPL-SCNC: 15 MMOL/L
CREAT SERPL-MCNC: 0.8 MG/DL
CREAT SERPL-MCNC: 0.8 MG/DL
CTP QC/QA: YES
DIFFERENTIAL METHOD: ABNORMAL
EOSINOPHIL # BLD AUTO: 0 K/UL
EOSINOPHIL NFR BLD: 0.1 %
ERYTHROCYTE [DISTWIDTH] IN BLOOD BY AUTOMATED COUNT: 13.7 %
EST. GFR  (AFRICAN AMERICAN): >60 ML/MIN/1.73 M^2
EST. GFR  (AFRICAN AMERICAN): >60 ML/MIN/1.73 M^2
EST. GFR  (NON AFRICAN AMERICAN): >60 ML/MIN/1.73 M^2
EST. GFR  (NON AFRICAN AMERICAN): >60 ML/MIN/1.73 M^2
GLUCOSE SERPL-MCNC: 97 MG/DL
GLUCOSE SERPL-MCNC: 97 MG/DL
HCT VFR BLD AUTO: 34.9 %
HGB BLD-MCNC: 11.4 G/DL
HIV 1+2 AB+HIV1 P24 AG SERPL QL IA: NEGATIVE
HYPOCHROMIA BLD QL SMEAR: ABNORMAL
LYMPHOCYTES # BLD AUTO: 1.6 K/UL
LYMPHOCYTES NFR BLD: 7.1 %
MAGNESIUM SERPL-MCNC: 2.3 MG/DL
MCH RBC QN AUTO: 28 PG
MCHC RBC AUTO-ENTMCNC: 32.7 %
MCV RBC AUTO: 86 FL
MONOCYTES # BLD AUTO: 0.4 K/UL
MONOCYTES NFR BLD: 1.7 %
NEUTROPHILS # BLD AUTO: 20.2 K/UL
NEUTROPHILS NFR BLD: 91 %
PHOSPHATE SERPL-MCNC: 2.9 MG/DL
PLATELET # BLD AUTO: 260 K/UL
PLATELET BLD QL SMEAR: ABNORMAL
PMV BLD AUTO: 10 FL
POIKILOCYTOSIS BLD QL SMEAR: SLIGHT
POLYCHROMASIA BLD QL SMEAR: ABNORMAL
POTASSIUM SERPL-SCNC: 4.3 MMOL/L
POTASSIUM SERPL-SCNC: 4.3 MMOL/L
RBC # BLD AUTO: 4.07 M/UL
SODIUM SERPL-SCNC: 137 MMOL/L
SODIUM SERPL-SCNC: 137 MMOL/L
WBC # BLD AUTO: 22.25 K/UL

## 2017-05-29 PROCEDURE — 94760 N-INVAS EAR/PLS OXIMETRY 1: CPT

## 2017-05-29 PROCEDURE — 88307 TISSUE EXAM BY PATHOLOGIST: CPT | Mod: 26,,,

## 2017-05-29 PROCEDURE — D9220A PRA ANESTHESIA: Mod: CRNA,,, | Performed by: NURSE ANESTHETIST, CERTIFIED REGISTERED

## 2017-05-29 PROCEDURE — 27000221 HC OXYGEN, UP TO 24 HOURS

## 2017-05-29 PROCEDURE — 25000003 PHARM REV CODE 250: Performed by: THORACIC SURGERY (CARDIOTHORACIC VASCULAR SURGERY)

## 2017-05-29 PROCEDURE — 27100025 HC TUBING, SET FLUID WARMER: Performed by: NURSE ANESTHETIST, CERTIFIED REGISTERED

## 2017-05-29 PROCEDURE — 88307 TISSUE EXAM BY PATHOLOGIST: CPT

## 2017-05-29 PROCEDURE — 63600175 PHARM REV CODE 636 W HCPCS: Performed by: THORACIC SURGERY (CARDIOTHORACIC VASCULAR SURGERY)

## 2017-05-29 PROCEDURE — 88342 IMHCHEM/IMCYTCHM 1ST ANTB: CPT | Mod: 26,,,

## 2017-05-29 PROCEDURE — 86920 COMPATIBILITY TEST SPIN: CPT

## 2017-05-29 PROCEDURE — 85025 COMPLETE CBC W/AUTO DIFF WBC: CPT

## 2017-05-29 PROCEDURE — 07B74ZX EXCISION OF THORAX LYMPHATIC, PERCUTANEOUS ENDOSCOPIC APPROACH, DIAGNOSTIC: ICD-10-PCS | Performed by: THORACIC SURGERY (CARDIOTHORACIC VASCULAR SURGERY)

## 2017-05-29 PROCEDURE — 25000003 PHARM REV CODE 250: Performed by: NURSE ANESTHETIST, CERTIFIED REGISTERED

## 2017-05-29 PROCEDURE — 81025 URINE PREGNANCY TEST: CPT | Performed by: THORACIC SURGERY (CARDIOTHORACIC VASCULAR SURGERY)

## 2017-05-29 PROCEDURE — C1729 CATH, DRAINAGE: HCPCS | Performed by: THORACIC SURGERY (CARDIOTHORACIC VASCULAR SURGERY)

## 2017-05-29 PROCEDURE — 94799 UNLISTED PULMONARY SVC/PX: CPT

## 2017-05-29 PROCEDURE — 25000003 PHARM REV CODE 250: Performed by: PHYSICIAN ASSISTANT

## 2017-05-29 PROCEDURE — 37000008 HC ANESTHESIA 1ST 15 MINUTES: Performed by: THORACIC SURGERY (CARDIOTHORACIC VASCULAR SURGERY)

## 2017-05-29 PROCEDURE — 84100 ASSAY OF PHOSPHORUS: CPT

## 2017-05-29 PROCEDURE — 63600175 PHARM REV CODE 636 W HCPCS: Performed by: ANESTHESIOLOGY

## 2017-05-29 PROCEDURE — 63600175 PHARM REV CODE 636 W HCPCS: Performed by: NURSE ANESTHETIST, CERTIFIED REGISTERED

## 2017-05-29 PROCEDURE — 37000009 HC ANESTHESIA EA ADD 15 MINS: Performed by: THORACIC SURGERY (CARDIOTHORACIC VASCULAR SURGERY)

## 2017-05-29 PROCEDURE — 36000710: Performed by: THORACIC SURGERY (CARDIOTHORACIC VASCULAR SURGERY)

## 2017-05-29 PROCEDURE — 32606 THORACOSCOPY W/BX MED SPACE: CPT | Mod: ,,, | Performed by: THORACIC SURGERY (CARDIOTHORACIC VASCULAR SURGERY)

## 2017-05-29 PROCEDURE — 80048 BASIC METABOLIC PNL TOTAL CA: CPT

## 2017-05-29 PROCEDURE — 20600001 HC STEP DOWN PRIVATE ROOM

## 2017-05-29 PROCEDURE — 36000711: Performed by: THORACIC SURGERY (CARDIOTHORACIC VASCULAR SURGERY)

## 2017-05-29 PROCEDURE — 71000039 HC RECOVERY, EACH ADD'L HOUR: Performed by: THORACIC SURGERY (CARDIOTHORACIC VASCULAR SURGERY)

## 2017-05-29 PROCEDURE — 27201423 OPTIME MED/SURG SUP & DEVICES STERILE SUPPLY: Performed by: THORACIC SURGERY (CARDIOTHORACIC VASCULAR SURGERY)

## 2017-05-29 PROCEDURE — 83735 ASSAY OF MAGNESIUM: CPT

## 2017-05-29 PROCEDURE — D9220A PRA ANESTHESIA: Mod: ANES,,, | Performed by: ANESTHESIOLOGY

## 2017-05-29 PROCEDURE — 88312 SPECIAL STAINS GROUP 1: CPT | Mod: 26,,,

## 2017-05-29 PROCEDURE — 71000033 HC RECOVERY, INTIAL HOUR: Performed by: THORACIC SURGERY (CARDIOTHORACIC VASCULAR SURGERY)

## 2017-05-29 PROCEDURE — 25000003 PHARM REV CODE 250: Performed by: STUDENT IN AN ORGANIZED HEALTH CARE EDUCATION/TRAINING PROGRAM

## 2017-05-29 PROCEDURE — 27200695 HC TUBE, ENDOBRONCHIAL: Performed by: NURSE ANESTHETIST, CERTIFIED REGISTERED

## 2017-05-29 RX ORDER — KETAMINE HYDROCHLORIDE 100 MG/ML
INJECTION, SOLUTION INTRAMUSCULAR; INTRAVENOUS
Status: DISCONTINUED | OUTPATIENT
Start: 2017-05-29 | End: 2017-05-29

## 2017-05-29 RX ORDER — FERROUS SULFATE 325(65) MG
325 TABLET, DELAYED RELEASE (ENTERIC COATED) ORAL DAILY
Status: DISCONTINUED | OUTPATIENT
Start: 2017-05-29 | End: 2017-05-30 | Stop reason: HOSPADM

## 2017-05-29 RX ORDER — LACTULOSE 10 G/15ML
20 SOLUTION ORAL EVERY 6 HOURS PRN
Status: DISCONTINUED | OUTPATIENT
Start: 2017-05-29 | End: 2017-05-30 | Stop reason: HOSPADM

## 2017-05-29 RX ORDER — LIDOCAINE HYDROCHLORIDE 10 MG/ML
1 INJECTION, SOLUTION EPIDURAL; INFILTRATION; INTRACAUDAL; PERINEURAL ONCE
Status: COMPLETED | OUTPATIENT
Start: 2017-05-29 | End: 2017-05-29

## 2017-05-29 RX ORDER — ONDANSETRON 2 MG/ML
4 INJECTION INTRAMUSCULAR; INTRAVENOUS DAILY PRN
Status: CANCELLED | OUTPATIENT
Start: 2017-05-29

## 2017-05-29 RX ORDER — NEOSTIGMINE METHYLSULFATE 1 MG/ML
INJECTION, SOLUTION INTRAVENOUS
Status: DISCONTINUED | OUTPATIENT
Start: 2017-05-29 | End: 2017-05-29

## 2017-05-29 RX ORDER — FLUOXETINE 10 MG/1
10 CAPSULE ORAL DAILY
Status: DISCONTINUED | OUTPATIENT
Start: 2017-05-29 | End: 2017-05-30 | Stop reason: HOSPADM

## 2017-05-29 RX ORDER — MAG HYDROX/ALUMINUM HYD/SIMETH 200-200-20
30 SUSPENSION, ORAL (FINAL DOSE FORM) ORAL EVERY 6 HOURS PRN
Status: DISCONTINUED | OUTPATIENT
Start: 2017-05-29 | End: 2017-05-30 | Stop reason: HOSPADM

## 2017-05-29 RX ORDER — SODIUM CHLORIDE 0.9 % (FLUSH) 0.9 %
3 SYRINGE (ML) INJECTION
Status: DISCONTINUED | OUTPATIENT
Start: 2017-05-29 | End: 2017-05-29 | Stop reason: HOSPADM

## 2017-05-29 RX ORDER — ACETAMINOPHEN 10 MG/ML
INJECTION, SOLUTION INTRAVENOUS
Status: DISCONTINUED | OUTPATIENT
Start: 2017-05-29 | End: 2017-05-29

## 2017-05-29 RX ORDER — GLYCOPYRROLATE 0.2 MG/ML
INJECTION INTRAMUSCULAR; INTRAVENOUS
Status: DISCONTINUED | OUTPATIENT
Start: 2017-05-29 | End: 2017-05-29

## 2017-05-29 RX ORDER — ACETAMINOPHEN 500 MG
1000 TABLET ORAL EVERY 8 HOURS PRN
Status: DISCONTINUED | OUTPATIENT
Start: 2017-05-29 | End: 2017-05-30 | Stop reason: HOSPADM

## 2017-05-29 RX ORDER — PHENYLEPHRINE HYDROCHLORIDE 10 MG/ML
INJECTION INTRAVENOUS
Status: DISCONTINUED | OUTPATIENT
Start: 2017-05-29 | End: 2017-05-29

## 2017-05-29 RX ORDER — CEFAZOLIN SODIUM 2 G/50ML
2 SOLUTION INTRAVENOUS
Status: COMPLETED | OUTPATIENT
Start: 2017-05-29 | End: 2017-05-30

## 2017-05-29 RX ORDER — SODIUM CHLORIDE 0.9 % (FLUSH) 0.9 %
3 SYRINGE (ML) INJECTION EVERY 8 HOURS
Status: DISCONTINUED | OUTPATIENT
Start: 2017-05-29 | End: 2017-05-30 | Stop reason: HOSPADM

## 2017-05-29 RX ORDER — ONDANSETRON 2 MG/ML
INJECTION INTRAMUSCULAR; INTRAVENOUS
Status: DISCONTINUED | OUTPATIENT
Start: 2017-05-29 | End: 2017-05-29

## 2017-05-29 RX ORDER — ONDANSETRON 2 MG/ML
4 INJECTION INTRAMUSCULAR; INTRAVENOUS DAILY PRN
Status: COMPLETED | OUTPATIENT
Start: 2017-05-29 | End: 2017-05-29

## 2017-05-29 RX ORDER — SODIUM CHLORIDE AND POTASSIUM CHLORIDE 150; 900 MG/100ML; MG/100ML
INJECTION, SOLUTION INTRAVENOUS CONTINUOUS
Status: DISCONTINUED | OUTPATIENT
Start: 2017-05-29 | End: 2017-05-30

## 2017-05-29 RX ORDER — FENTANYL CITRATE 50 UG/ML
25 INJECTION, SOLUTION INTRAMUSCULAR; INTRAVENOUS EVERY 5 MIN PRN
Status: CANCELLED | OUTPATIENT
Start: 2017-05-29

## 2017-05-29 RX ORDER — IPRATROPIUM BROMIDE AND ALBUTEROL SULFATE 2.5; .5 MG/3ML; MG/3ML
3 SOLUTION RESPIRATORY (INHALATION) EVERY 4 HOURS PRN
Status: DISCONTINUED | OUTPATIENT
Start: 2017-05-29 | End: 2017-05-30 | Stop reason: HOSPADM

## 2017-05-29 RX ORDER — FENTANYL CITRATE 50 UG/ML
25 INJECTION, SOLUTION INTRAMUSCULAR; INTRAVENOUS EVERY 5 MIN PRN
Status: DISCONTINUED | OUTPATIENT
Start: 2017-05-29 | End: 2017-05-29 | Stop reason: HOSPADM

## 2017-05-29 RX ORDER — FAMOTIDINE 10 MG/ML
20 INJECTION INTRAVENOUS EVERY 12 HOURS
Status: DISCONTINUED | OUTPATIENT
Start: 2017-05-29 | End: 2017-05-30 | Stop reason: HOSPADM

## 2017-05-29 RX ORDER — POVIDONE-IODINE 10 MG/G
OINTMENT TOPICAL
Status: DISCONTINUED | OUTPATIENT
Start: 2017-05-29 | End: 2017-05-30 | Stop reason: HOSPADM

## 2017-05-29 RX ORDER — POLYETHYLENE GLYCOL 3350 17 G/17G
17 POWDER, FOR SOLUTION ORAL DAILY
Status: DISCONTINUED | OUTPATIENT
Start: 2017-05-29 | End: 2017-05-30 | Stop reason: HOSPADM

## 2017-05-29 RX ORDER — HYDROMORPHONE HYDROCHLORIDE 2 MG/ML
INJECTION, SOLUTION INTRAMUSCULAR; INTRAVENOUS; SUBCUTANEOUS
Status: DISCONTINUED | OUTPATIENT
Start: 2017-05-29 | End: 2017-05-29

## 2017-05-29 RX ORDER — SODIUM CHLORIDE 0.9 % (FLUSH) 0.9 %
3 SYRINGE (ML) INJECTION
Status: CANCELLED | OUTPATIENT
Start: 2017-05-29

## 2017-05-29 RX ORDER — ONDANSETRON 2 MG/ML
4 INJECTION INTRAMUSCULAR; INTRAVENOUS DAILY PRN
Status: DISCONTINUED | OUTPATIENT
Start: 2017-05-29 | End: 2017-05-29 | Stop reason: HOSPADM

## 2017-05-29 RX ORDER — IBUPROFEN 600 MG/1
600 TABLET ORAL EVERY 6 HOURS PRN
Status: DISCONTINUED | OUTPATIENT
Start: 2017-05-29 | End: 2017-05-30 | Stop reason: HOSPADM

## 2017-05-29 RX ORDER — LANOLIN ALCOHOL/MO/W.PET/CERES
1000 CREAM (GRAM) TOPICAL DAILY
Status: DISCONTINUED | OUTPATIENT
Start: 2017-05-29 | End: 2017-05-30 | Stop reason: HOSPADM

## 2017-05-29 RX ORDER — SODIUM CHLORIDE 9 MG/ML
INJECTION, SOLUTION INTRAVENOUS CONTINUOUS
Status: DISCONTINUED | OUTPATIENT
Start: 2017-05-29 | End: 2017-05-29

## 2017-05-29 RX ORDER — PROPOFOL 10 MG/ML
VIAL (ML) INTRAVENOUS
Status: DISCONTINUED | OUTPATIENT
Start: 2017-05-29 | End: 2017-05-29

## 2017-05-29 RX ORDER — MIDAZOLAM HYDROCHLORIDE 1 MG/ML
INJECTION, SOLUTION INTRAMUSCULAR; INTRAVENOUS
Status: DISCONTINUED | OUTPATIENT
Start: 2017-05-29 | End: 2017-05-29

## 2017-05-29 RX ORDER — OXYCODONE HYDROCHLORIDE 5 MG/1
5 TABLET ORAL EVERY 4 HOURS PRN
Status: DISCONTINUED | OUTPATIENT
Start: 2017-05-29 | End: 2017-05-30 | Stop reason: HOSPADM

## 2017-05-29 RX ORDER — CALCIUM CARBONATE 200(500)MG
1000 TABLET,CHEWABLE ORAL EVERY 8 HOURS PRN
Status: DISCONTINUED | OUTPATIENT
Start: 2017-05-29 | End: 2017-05-30 | Stop reason: HOSPADM

## 2017-05-29 RX ORDER — DEXAMETHASONE SODIUM PHOSPHATE 4 MG/ML
INJECTION, SOLUTION INTRA-ARTICULAR; INTRALESIONAL; INTRAMUSCULAR; INTRAVENOUS; SOFT TISSUE
Status: DISCONTINUED | OUTPATIENT
Start: 2017-05-29 | End: 2017-05-29

## 2017-05-29 RX ORDER — ROCURONIUM BROMIDE 10 MG/ML
INJECTION, SOLUTION INTRAVENOUS
Status: DISCONTINUED | OUTPATIENT
Start: 2017-05-29 | End: 2017-05-29

## 2017-05-29 RX ORDER — BISACODYL 10 MG
10 SUPPOSITORY, RECTAL RECTAL DAILY PRN
Status: DISCONTINUED | OUTPATIENT
Start: 2017-05-29 | End: 2017-05-30 | Stop reason: HOSPADM

## 2017-05-29 RX ORDER — FENTANYL CITRATE 50 UG/ML
INJECTION, SOLUTION INTRAMUSCULAR; INTRAVENOUS
Status: DISCONTINUED | OUTPATIENT
Start: 2017-05-29 | End: 2017-05-29

## 2017-05-29 RX ORDER — ENOXAPARIN SODIUM 100 MG/ML
30 INJECTION SUBCUTANEOUS
Status: DISCONTINUED | OUTPATIENT
Start: 2017-05-30 | End: 2017-05-30 | Stop reason: HOSPADM

## 2017-05-29 RX ORDER — METOCLOPRAMIDE HYDROCHLORIDE 5 MG/ML
5 INJECTION INTRAMUSCULAR; INTRAVENOUS EVERY 6 HOURS PRN
Status: DISCONTINUED | OUTPATIENT
Start: 2017-05-29 | End: 2017-05-30 | Stop reason: HOSPADM

## 2017-05-29 RX ORDER — LIDOCAINE HCL/PF 100 MG/5ML
SYRINGE (ML) INTRAVENOUS
Status: DISCONTINUED | OUTPATIENT
Start: 2017-05-29 | End: 2017-05-29

## 2017-05-29 RX ORDER — ONDANSETRON 8 MG/1
8 TABLET, ORALLY DISINTEGRATING ORAL EVERY 8 HOURS PRN
Status: DISCONTINUED | OUTPATIENT
Start: 2017-05-29 | End: 2017-05-30 | Stop reason: HOSPADM

## 2017-05-29 RX ORDER — SCOLOPAMINE TRANSDERMAL SYSTEM 1 MG/1
PATCH, EXTENDED RELEASE TRANSDERMAL
Status: DISCONTINUED | OUTPATIENT
Start: 2017-05-29 | End: 2017-05-29

## 2017-05-29 RX ORDER — OXYCODONE HYDROCHLORIDE 5 MG/1
10 TABLET ORAL EVERY 4 HOURS PRN
Status: DISCONTINUED | OUTPATIENT
Start: 2017-05-29 | End: 2017-05-30 | Stop reason: HOSPADM

## 2017-05-29 RX ORDER — BACITRACIN ZINC 500 UNIT/G
1 OINTMENT (GRAM) TOPICAL DAILY
Status: DISCONTINUED | OUTPATIENT
Start: 2017-05-30 | End: 2017-05-29

## 2017-05-29 RX ORDER — KETOROLAC TROMETHAMINE 30 MG/ML
INJECTION, SOLUTION INTRAMUSCULAR; INTRAVENOUS
Status: DISCONTINUED | OUTPATIENT
Start: 2017-05-29 | End: 2017-05-29

## 2017-05-29 RX ORDER — HYDROMORPHONE HYDROCHLORIDE 1 MG/ML
0.2 INJECTION, SOLUTION INTRAMUSCULAR; INTRAVENOUS; SUBCUTANEOUS EVERY 5 MIN PRN
Status: DISCONTINUED | OUTPATIENT
Start: 2017-05-29 | End: 2017-05-29 | Stop reason: HOSPADM

## 2017-05-29 RX ORDER — CEFAZOLIN SODIUM 2 G/50ML
2 SOLUTION INTRAVENOUS
Status: DISCONTINUED | OUTPATIENT
Start: 2017-05-29 | End: 2017-05-29

## 2017-05-29 RX ADMIN — CYANOCOBALAMIN TAB 1000 MCG 1000 MCG: 1000 TAB at 02:05

## 2017-05-29 RX ADMIN — NEOSTIGMINE METHYLSULFATE 3 MG: 1 INJECTION INTRAVENOUS at 01:05

## 2017-05-29 RX ADMIN — GLYCOPYRROLATE 0.4 MG: 0.2 INJECTION, SOLUTION INTRAMUSCULAR; INTRAVENOUS at 11:05

## 2017-05-29 RX ADMIN — ACETAMINOPHEN 1000 MG: 10 INJECTION, SOLUTION INTRAVENOUS at 11:05

## 2017-05-29 RX ADMIN — PHENYLEPHRINE HYDROCHLORIDE 50 MCG: 10 INJECTION INTRAVENOUS at 12:05

## 2017-05-29 RX ADMIN — PROPOFOL 200 MG: 10 INJECTION, EMULSION INTRAVENOUS at 11:05

## 2017-05-29 RX ADMIN — FLUOXETINE 10 MG: 10 CAPSULE ORAL at 02:05

## 2017-05-29 RX ADMIN — SODIUM CHLORIDE: 0.9 INJECTION, SOLUTION INTRAVENOUS at 10:05

## 2017-05-29 RX ADMIN — MIDAZOLAM HYDROCHLORIDE 2 MG: 1 INJECTION, SOLUTION INTRAMUSCULAR; INTRAVENOUS at 11:05

## 2017-05-29 RX ADMIN — OXYCODONE HYDROCHLORIDE 10 MG: 5 TABLET ORAL at 11:05

## 2017-05-29 RX ADMIN — HYDROMORPHONE HYDROCHLORIDE 0.4 MG: 2 INJECTION, SOLUTION INTRAMUSCULAR; INTRAVENOUS; SUBCUTANEOUS at 01:05

## 2017-05-29 RX ADMIN — LIDOCAINE HYDROCHLORIDE 75 MG: 20 INJECTION, SOLUTION INTRAVENOUS at 11:05

## 2017-05-29 RX ADMIN — SODIUM CHLORIDE AND POTASSIUM CHLORIDE: 9; 1.49 INJECTION, SOLUTION INTRAVENOUS at 03:05

## 2017-05-29 RX ADMIN — KETAMINE HYDROCHLORIDE 30 MG: 100 INJECTION, SOLUTION, CONCENTRATE INTRAMUSCULAR; INTRAVENOUS at 11:05

## 2017-05-29 RX ADMIN — FAMOTIDINE 20 MG: 10 INJECTION, SOLUTION INTRAVENOUS at 09:05

## 2017-05-29 RX ADMIN — HYDROMORPHONE HYDROCHLORIDE 0.2 MG: 1 INJECTION, SOLUTION INTRAMUSCULAR; INTRAVENOUS; SUBCUTANEOUS at 04:05

## 2017-05-29 RX ADMIN — Medication 2 G: at 11:05

## 2017-05-29 RX ADMIN — ONDANSETRON 4 MG: 2 INJECTION INTRAMUSCULAR; INTRAVENOUS at 02:05

## 2017-05-29 RX ADMIN — FENTANYL CITRATE 25 MCG: 50 INJECTION, SOLUTION INTRAMUSCULAR; INTRAVENOUS at 12:05

## 2017-05-29 RX ADMIN — PREDNISONE 30 MG: 10 TABLET ORAL at 02:05

## 2017-05-29 RX ADMIN — PHENYLEPHRINE HYDROCHLORIDE 100 MCG: 10 INJECTION INTRAVENOUS at 01:05

## 2017-05-29 RX ADMIN — ONDANSETRON 4 MG: 2 INJECTION INTRAMUSCULAR; INTRAVENOUS at 01:05

## 2017-05-29 RX ADMIN — KETOROLAC TROMETHAMINE 15 MG: 30 INJECTION, SOLUTION INTRAMUSCULAR; INTRAVENOUS at 01:05

## 2017-05-29 RX ADMIN — FENTANYL CITRATE 100 MCG: 50 INJECTION, SOLUTION INTRAMUSCULAR; INTRAVENOUS at 11:05

## 2017-05-29 RX ADMIN — OXYCODONE HYDROCHLORIDE 5 MG: 5 TABLET ORAL at 07:05

## 2017-05-29 RX ADMIN — DEXAMETHASONE SODIUM PHOSPHATE 8 MG: 4 INJECTION, SOLUTION INTRAMUSCULAR; INTRAVENOUS at 11:05

## 2017-05-29 RX ADMIN — FENTANYL CITRATE 50 MCG: 50 INJECTION, SOLUTION INTRAMUSCULAR; INTRAVENOUS at 11:05

## 2017-05-29 RX ADMIN — SCOPALAMINE 1 PATCH: 1 PATCH, EXTENDED RELEASE TRANSDERMAL at 11:05

## 2017-05-29 RX ADMIN — OXYCODONE HYDROCHLORIDE 10 MG: 5 TABLET ORAL at 02:05

## 2017-05-29 RX ADMIN — GLYCOPYRROLATE 0.4 MG: 0.2 INJECTION, SOLUTION INTRAMUSCULAR; INTRAVENOUS at 01:05

## 2017-05-29 RX ADMIN — KETAMINE HYDROCHLORIDE 20 MG: 100 INJECTION, SOLUTION, CONCENTRATE INTRAMUSCULAR; INTRAVENOUS at 01:05

## 2017-05-29 RX ADMIN — Medication 3 ML: at 03:05

## 2017-05-29 RX ADMIN — PHENYLEPHRINE HYDROCHLORIDE 100 MCG: 10 INJECTION INTRAVENOUS at 12:05

## 2017-05-29 RX ADMIN — DEXTROSE 2 G: 50 INJECTION, SOLUTION INTRAVENOUS at 07:05

## 2017-05-29 RX ADMIN — FERROUS SULFATE TAB EC 325 MG (65 MG FE EQUIVALENT) 325 MG: 325 (65 FE) TABLET DELAYED RESPONSE at 02:05

## 2017-05-29 RX ADMIN — SODIUM CHLORIDE, SODIUM GLUCONATE, SODIUM ACETATE, POTASSIUM CHLORIDE, MAGNESIUM CHLORIDE, SODIUM PHOSPHATE, DIBASIC, AND POTASSIUM PHOSPHATE: .53; .5; .37; .037; .03; .012; .00082 INJECTION, SOLUTION INTRAVENOUS at 11:05

## 2017-05-29 RX ADMIN — Medication 3 ML: at 09:05

## 2017-05-29 RX ADMIN — IBUPROFEN 600 MG: 600 TABLET, FILM COATED ORAL at 10:05

## 2017-05-29 RX ADMIN — ROCURONIUM BROMIDE 50 MG: 10 INJECTION, SOLUTION INTRAVENOUS at 11:05

## 2017-05-29 RX ADMIN — LIDOCAINE HYDROCHLORIDE 0.2 MG: 10 INJECTION, SOLUTION EPIDURAL; INFILTRATION; INTRACAUDAL; PERINEURAL at 10:05

## 2017-05-29 RX ADMIN — FENTANYL CITRATE 50 MCG: 50 INJECTION, SOLUTION INTRAMUSCULAR; INTRAVENOUS at 12:05

## 2017-05-29 NOTE — OP NOTE
[facility]  Cardiothoracic Surgery  Operative Note    SUMMARY     Date of Procedure: 5/29/2017     Procedure: Procedure(s) (LRB):  THORACOSCOPY-VIDEO ASSISTED (VATS) (Right)  DISSECTION-LYMPH NODE (Right)    Surgeon(s) and Role:     * Channing Saucedo MD - Fellow     * Moustapha Kaur MD - Primary    Assisting Surgeon: None    Pre-Operative Diagnosis: Mediastinal adenopathy [R59.0]    Post-Operative Diagnosis: Post-Op Diagnosis Codes:     * Mediastinal adenopathy [R59.0]    Anesthesia: General    Technical Procedures Used: thoracoscopic mediastinal lymph node biopsy    Description of the Findings of the Procedure: biopsy of right lower paratracheal lymph nodes    Significant Surgical Tasks Conducted by the Assistant(s), if Applicable: n/a    Complications: No    Estimated Blood Loss (EBL): 50 mL           Implants: * No implants in log *    Specimens:   Specimen (12h ago through future)    4R lymph nodes for permanent (low paratracheal)                  Condition: Good    Disposition: PACU - hemodynamically stable.    Attestation: Dr Kaur was present and scrubbed for the entire procedure.    INDICATION:     Donna Kumar is a 22 y.o. woman. She has dyspnea on exertion and mediastinal lymph adenopathy. After several attempts at other biopsies including mediastinoscopy she agreed to have a thoracoscopic biopsy.    PROCEDURE:    She was identified in the preoperative period. She was transferred to [facility] and general anesthesia was induced. Double lumen endotracheal tube was placed. She was positioned left side down lateral decubitus and right chest was prepared for surgery. Time out was used to verify all items on the checklist were correct.     Four 5 mm thoracoscopic trocars were placed along the chest wall along the anterior and posterior axillary line. A fan retractor was used to displace the lung. The carinal nodes were inspected, however, dense fibrosis stopped our attempt to  sample them. The low paratracheal nodes were inspected. Samples were taken piecemeal and sent for permanent section. Surigcell was placed for local hemostasis. 19 F Bebeto chest tube was placed. Incisions were closed with absorbable suture. Bandages were applied. She was extubated in the room and transferred to PACU.     Channing Saucedo D.O.   Fellow in Cardiothoracic Surgery  [facility]  Pg [phone]  5/29/2017 1:38 PM

## 2017-05-29 NOTE — INTERVAL H&P NOTE
The patient has been examined and the H&P has been reviewed:    I concur with the findings and no changes have occurred since H&P was written.    Anesthesia/Surgery risks, benefits and alternative options discussed and understood by patient/family.          Active Hospital Problems    Diagnosis  POA    Mediastinal lymphadenopathy [R59.0]  Yes      Resolved Hospital Problems    Diagnosis Date Resolved POA   No resolved problems to display.

## 2017-05-29 NOTE — NURSING TRANSFER
Nursing Transfer Note      5/29/2017     Transfer To: [id number]    Transfer via stretcher    Transfer with cardiac monitoring    Transported by rn and pct    Medicines sent: n/a    Chart send with patient: Yes    Notified: mom

## 2017-05-29 NOTE — ANESTHESIA POSTPROCEDURE EVALUATION
"Anesthesia Post Evaluation    Patient: Donna Kumar    Procedure(s) Performed: Procedure(s) (LRB):  THORACOSCOPY-VIDEO ASSISTED (VATS) (Right)  DISSECTION-LYMPH NODE (Right)    Final Anesthesia Type: general  Patient location during evaluation: PACU  Patient participation: Yes- Able to Participate  Level of consciousness: awake and alert  Post-procedure vital signs: reviewed and stable  Pain management: adequate  Airway patency: patent  PONV status at discharge: No PONV  Anesthetic complications: no      Cardiovascular status: blood pressure returned to baseline  Respiratory status: unassisted  Hydration status: euvolemic  Follow-up not needed.        Visit Vitals  /76   Pulse 97   Temp 36.4 °C (97.5 °F)   Resp 20   Ht 5' 3" (1.6 m)   Wt 60.3 kg (133 lb)   LMP 05/18/2017   SpO2 96%   Breastfeeding? No   BMI 23.56 kg/m²       Pain/Cecilia Score: Pain Assessment Performed: Yes (5/29/2017  1:50 PM)  Presence of Pain: denies (5/29/2017  1:50 PM)  Pain Rating Prior to Med Admin: 0 (5/29/2017  1:50 PM)  Cecilia Score: 10 (5/29/2017  1:50 PM)      "

## 2017-05-29 NOTE — H&P (VIEW-ONLY)
Subjective:       Patient ID: Donna Kumar is a 22 y.o. female.    Chief Complaint: No chief complaint on file.    HPI   21 y.o. female presents with PMH of mediastinal lymphadenopathy first identified in March 2017. Reports 6 month history of night sweats, chest tightness and pressure, dysphagia and SOB. She was prescribed a Z pack and then a course of Levaquin but symptoms persisted. She was referred to Dr. Kim and eventually underwent a bronch/EBUS on 3/17/17. Pathology showed acute and chronic granulomatous inflammation. Fungal and mycobacterial studies negative. On repeat chest CT the mediastinal lymphadenopathy persisted. Recently prescribed steroids which she reports has improved dysphagia and SOB.      PSH negative. Never smoker. She is in college in [place] and very active.     Interval History:   Mediastinoscopy was non diagnostic. Secondary to difficulty entering the mediastinum which had mediastinal fibrosis. Splenic US in the PACU revealed stable splenic cyst. Uncomplicated post-operative course.   Following procedure was seen by Dr. Wright and underwent bone marrow biopsy which was benign. PET/CT on 5/23/17 significant for mediastinal and hilar adenopathy uptake with remaining normal physicological uptake. Today, here to discuss other means of surgical biopsy. Reports night sweats have largely resolved. Currently taking prednisone 30mg. Denies fever, chills, fatigue, wt loss, appetite changes, cough, SOB, CP.     Review of Systems   Constitutional: Negative for activity change, appetite change, chills, diaphoresis, fatigue and fever.   HENT: Negative for congestion.    Eyes: Negative for pain.   Respiratory: Negative for cough, shortness of breath and wheezing.    Cardiovascular: Negative for chest pain and palpitations.   Gastrointestinal: Negative for abdominal pain, nausea and vomiting.   Endocrine: Negative for cold intolerance and heat intolerance.   Genitourinary: Negative for  difficulty urinating.   Skin: Negative for rash.   Neurological: Negative for syncope and headaches.   Hematological: Negative for adenopathy.   Psychiatric/Behavioral: Negative for confusion.         Objective:      Physical Exam   Constitutional: She is oriented to person, place, and time. She appears well-developed and well-nourished. No distress.   HENT:   Head: Normocephalic and atraumatic.   Eyes: EOM are normal.   Neck: Normal range of motion. Neck supple.   Collar incision healing erythema. No surrounding erythema or drainage.    Cardiovascular: Normal rate, regular rhythm and normal heart sounds.    Pulmonary/Chest: Effort normal and breath sounds normal. No respiratory distress. She has no wheezes. She exhibits no tenderness.   Abdominal: Soft. She exhibits no distension.   Lymphadenopathy:     She has no cervical adenopathy.   Neurological: She is alert and oriented to person, place, and time.   Skin: Skin is warm and dry.   Psychiatric: She has a normal mood and affect.   Vitals reviewed.        Pathology 5/11/17:  Right mainstem bronchus, biopsy:   Benign bronchial mucosa with underlying necrotizing granulomatous type inflammation.   Special stains (AFB and GMS) for organisms are pending and the results will be issued in    Bone Marrow Biopsy 5/18/17:   BONE MARROW ASPIRATE SMEARS, TOUCH IMPRINTS, CORE BIOPSY, LEFT ILIAC CREST, AND CLOT  SECTION:   Normocellular bone marrow with trilineage hematopoiesis and no morphologic or immunophenotypic evidence of a  hematolymphoid neoplasm, see comment.    Splenic US 5/11/17:  Minimally complex, sonographically nonspecific cystic structure within the mid body of the spleen measuring 1.8 x 1.6 x 1.4 cm, grossly unchanged compared to prior CT.  Two additional nonspecific subcentimeter cystic lesions within the spleen.    PET/CT 5/23/17:  There is right supraclavicular, mediastinal, and right hilar lymphadenopathy.  Subcarinal index lesion SUV max 11.71.  Right  hilar SUV max 9.55.  There is physiologic intracranial activity.  There is physiologic neck activity.  No significant axillary lymph nodes are seen and no primary breast lesion is seen.  There is physiologic liver, spleen, GI, and  activity.  No bone lesions are seen.    Assessment:       21 year old female with PET avid mediastinal/hilar lymphadenopathy here today to discuss surgical biopsy. BM biopsy normal. Nonspecific splenic cyst, grossly unchanged from previous imaging.     Plan:       Will plan for Right VATS with mediastinal lymph node biopsy on 5/29/17.  Appropriate patient education regarding the magalis-operative period as well as intraperative details were discussed. Risks, including but not limited to, bleeding, infection, pain and anesthetic complication were discussed. Patient was given the opportunity to ask questions and to have those questions answered to their satisfaction. Patient verbalized understanding to both procedure and associated risks. Consent was obtained.      ATTENDING ATTESTATION:    I evaluated the patient and I agree with the assessment and plan.  I recommend a RVATS mediastinal exploration with LN biopsy for diagnostic purposes.  I recommend VATS because less invasive procedures have failed to generate a diagnosis.  I have discussed the technical aspects, risks and benefits of the procedure with the patient.  I did inform the patient that the risks are the most common risks and that there are other less likely risks that are too numerous to elaborate.  The patient is aware and has agreed to undergo the procedure as detailed on the consent form.

## 2017-05-29 NOTE — NURSING TRANSFER
Nursing Transfer Note      5/29/2017     Transfer To: [facility] From: PACU    Transfer via stretcher    Transfer with cardiac monitoring    Transported by RN    Medicines sent: NO    Chart send with patient: Yes    Notified:     Patient reassessed at:  (5/29/17, 1750)    Upon arrival to floor: cardiac monitor applied, patient oriented to room, call bell in reach and bed in lowest position

## 2017-05-29 NOTE — ANESTHESIA RELEASE NOTE
Anesthesia Release from PACU Note    Anesthesia Release from PACU Note    Patient: Donna Kumar    Procedure(s) Performed: Procedure(s) (LRB):  THORACOSCOPY-VIDEO ASSISTED (VATS) (Right)  DISSECTION-LYMPH NODE (Right)    Anesthesia type: general    Post pain: Adequate analgesia    Post assessment: no apparent anesthetic complications, tolerated procedure well and no evidence of recall    Post vital signs:   Vitals:    05/29/17 1445   BP:    Pulse:    Resp: 20   Temp:         Level of consciousness: awake, alert  and oriented    Nausea/Vomiting: no nausea/no vomiting    Complications: none    Airway Patency: patent    Respiratory: unassisted, spontaneous ventilation    Cardiovascular: stable and blood pressure at baseline    Hydration: euvolemic

## 2017-05-29 NOTE — BRIEF OP NOTE
[facility]  Brief Operative Note    SUMMARY     Surgery Date: 5/29/2017     Surgeon(s) and Role:     * Channing Saucedo MD - Fellow     * Moustapha Kaur MD - Primary    Assisting Surgeon: None    Pre-op Diagnosis:  Mediastinal adenopathy [R59.0]    Post-op Diagnosis:  Post-Op Diagnosis Codes:     * Mediastinal adenopathy [R59.0]    Procedure(s) (LRB):  THORACOSCOPY-VIDEO ASSISTED (VATS) (Right)  DISSECTION-LYMPH NODE (Right)    Anesthesia: General    Description of Procedure: thoracoscopic mediastinal lymph node biopsy    Description of the findings of the procedure: biopsy of 4R lymph nodes    Estimated Blood Loss: 50 mL         Specimens:   Specimen (12h ago through future)    Level 4R (low paratracheal) lymph nodes for permanent

## 2017-05-29 NOTE — TRANSFER OF CARE
"Anesthesia Transfer of Care Note    Patient: Donna Kumar    Procedure(s) Performed: Procedure(s) (LRB):  THORACOSCOPY-VIDEO ASSISTED (VATS) (Right)  DISSECTION-LYMPH NODE (Right)    Patient location: PACU    Anesthesia Type: general    Transport from OR: Transported from OR on 6-10 L/min O2 by face mask with adequate spontaneous ventilation    Post pain: adequate analgesia    Post assessment: no apparent anesthetic complications    Post vital signs: stable    Level of consciousness: awake, alert and oriented    Nausea/Vomiting: no nausea/vomiting    Complications: none    Transfer of care protocol was followed      Last vitals:   Visit Vitals  /67   Pulse 89   Temp 36.4 °C (97.5 °F)   Resp 16   Ht 5' 3" (1.6 m)   Wt 60.3 kg (133 lb)   LMP 05/18/2017   SpO2 100%   Breastfeeding? No   BMI 23.56 kg/m²     "

## 2017-05-30 ENCOUNTER — PATIENT MESSAGE (OUTPATIENT)
Dept: CARDIOTHORACIC SURGERY | Facility: CLINIC | Age: 22
End: 2017-05-30

## 2017-05-30 VITALS
HEART RATE: 64 BPM | WEIGHT: 133 LBS | SYSTOLIC BLOOD PRESSURE: 104 MMHG | BODY MASS INDEX: 23.57 KG/M2 | HEIGHT: 63 IN | OXYGEN SATURATION: 94 % | TEMPERATURE: 99 F | DIASTOLIC BLOOD PRESSURE: 63 MMHG | RESPIRATION RATE: 16 BRPM

## 2017-05-30 LAB
BASOPHILS # BLD AUTO: 0 K/UL
BASOPHILS NFR BLD: 0 %
DIFFERENTIAL METHOD: ABNORMAL
EOSINOPHIL # BLD AUTO: 0 K/UL
EOSINOPHIL NFR BLD: 0 %
ERYTHROCYTE [DISTWIDTH] IN BLOOD BY AUTOMATED COUNT: 13.9 %
HCT VFR BLD AUTO: 35.3 %
HGB BLD-MCNC: 11.8 G/DL
LYMPHOCYTES # BLD AUTO: 0.7 K/UL
LYMPHOCYTES NFR BLD: 5.5 %
MAGNESIUM SERPL-MCNC: 2.3 MG/DL
MCH RBC QN AUTO: 28.2 PG
MCHC RBC AUTO-ENTMCNC: 33.4 %
MCV RBC AUTO: 84 FL
MONOCYTES # BLD AUTO: 0.3 K/UL
MONOCYTES NFR BLD: 2.2 %
NEUTROPHILS # BLD AUTO: 11.6 K/UL
NEUTROPHILS NFR BLD: 92.1 %
PHOSPHATE SERPL-MCNC: 4.5 MG/DL
PLATELET # BLD AUTO: 310 K/UL
PMV BLD AUTO: 9.8 FL
RBC # BLD AUTO: 4.19 M/UL
WBC # BLD AUTO: 12.56 K/UL

## 2017-05-30 PROCEDURE — 84100 ASSAY OF PHOSPHORUS: CPT

## 2017-05-30 PROCEDURE — 25000003 PHARM REV CODE 250: Performed by: THORACIC SURGERY (CARDIOTHORACIC VASCULAR SURGERY)

## 2017-05-30 PROCEDURE — 85025 COMPLETE CBC W/AUTO DIFF WBC: CPT

## 2017-05-30 PROCEDURE — 63600175 PHARM REV CODE 636 W HCPCS: Performed by: THORACIC SURGERY (CARDIOTHORACIC VASCULAR SURGERY)

## 2017-05-30 PROCEDURE — 97530 THERAPEUTIC ACTIVITIES: CPT

## 2017-05-30 PROCEDURE — 97165 OT EVAL LOW COMPLEX 30 MIN: CPT

## 2017-05-30 PROCEDURE — 36415 COLL VENOUS BLD VENIPUNCTURE: CPT

## 2017-05-30 PROCEDURE — 83735 ASSAY OF MAGNESIUM: CPT

## 2017-05-30 PROCEDURE — 97161 PT EVAL LOW COMPLEX 20 MIN: CPT

## 2017-05-30 PROCEDURE — 25000003 PHARM REV CODE 250: Performed by: STUDENT IN AN ORGANIZED HEALTH CARE EDUCATION/TRAINING PROGRAM

## 2017-05-30 RX ORDER — OXYCODONE AND ACETAMINOPHEN 5; 325 MG/1; MG/1
1 TABLET ORAL EVERY 4 HOURS PRN
Qty: 51 TABLET | Refills: 0 | Status: SHIPPED | OUTPATIENT
Start: 2017-05-30 | End: 2017-06-07

## 2017-05-30 RX ADMIN — CYANOCOBALAMIN TAB 1000 MCG 1000 MCG: 1000 TAB at 08:05

## 2017-05-30 RX ADMIN — ACETAMINOPHEN 1000 MG: 500 TABLET ORAL at 02:05

## 2017-05-30 RX ADMIN — PREDNISONE 30 MG: 10 TABLET ORAL at 08:05

## 2017-05-30 RX ADMIN — OXYCODONE HYDROCHLORIDE 10 MG: 5 TABLET ORAL at 03:05

## 2017-05-30 RX ADMIN — FERROUS SULFATE TAB EC 325 MG (65 MG FE EQUIVALENT) 325 MG: 325 (65 FE) TABLET DELAYED RESPONSE at 08:05

## 2017-05-30 RX ADMIN — OXYCODONE HYDROCHLORIDE 10 MG: 5 TABLET ORAL at 08:05

## 2017-05-30 RX ADMIN — ENOXAPARIN SODIUM 30 MG: 100 INJECTION SUBCUTANEOUS at 01:05

## 2017-05-30 RX ADMIN — IBUPROFEN 600 MG: 600 TABLET, FILM COATED ORAL at 05:05

## 2017-05-30 RX ADMIN — POLYETHYLENE GLYCOL 3350 17 G: 17 POWDER, FOR SOLUTION ORAL at 08:05

## 2017-05-30 RX ADMIN — FLUOXETINE 10 MG: 10 CAPSULE ORAL at 08:05

## 2017-05-30 RX ADMIN — FAMOTIDINE 20 MG: 10 INJECTION, SOLUTION INTRAVENOUS at 08:05

## 2017-05-30 RX ADMIN — DEXTROSE 2 G: 50 INJECTION, SOLUTION INTRAVENOUS at 04:05

## 2017-05-30 RX ADMIN — Medication 3 ML: at 01:05

## 2017-05-30 RX ADMIN — OXYCODONE HYDROCHLORIDE 5 MG: 5 TABLET ORAL at 01:05

## 2017-05-30 RX ADMIN — DEXTROSE 2 G: 50 INJECTION, SOLUTION INTRAVENOUS at 11:05

## 2017-05-30 NOTE — PT/OT/SLP EVAL
"Occupational Therapy  Evaluation    Donna Kumar   MRN: [id number]   Admitting Diagnosis: Mediastinal lymphadenopathy    OT Date of Treatment: 05/30/17   OT Start Time: 0843  OT Stop Time: 0914  OT Total Time (min): 31 min    Billable Minutes:  Evaluation 23  Therapeutic Activity 8    Diagnosis: Mediastinal lymphadenopathy     Past Medical History:   Diagnosis Date    B12 deficiency     Iron deficiency     Mastocytosis     Obsessive compulsive disorder     followed by Dr. Malone      Past Surgical History:   Procedure Laterality Date    BRONCHOSCOPY      LUNG BIOPSY  03/2017       Referring physician: Channing Saucedo M.D.  Date referred to OT: 5/29    General Precautions: Standard, fall    Do you have any cultural, spiritual, Religion conflicts, given your current situation?: No     Patient History:  Living Environment  Living Environment Comment: While away at college pt reports living in a [facility] in [place] with 2 roommates. Upon DC, pt tania be living with parents in a 2SH with 21+ stairs (with 1 landing) to reach bedroom and bathroom.  PTA pt was independent in ADL and mobility tasks. 24/7 assistance available upon DC.   Equipment Currently Used at Home: none    Prior level of function:   Bed Mobility/Transfers: independent  Grooming: independent  Bathing: independent  Upper Body Dressing: independent  Lower Body Dressing: independent  Toileting: independent  Home Management Skills: independent  Driving License: Yes  Mode of Transportation: Car  Occupation: Student     Dominant hand: right    Subjective:  Communicated with RN prior to session.  "I feel so much better without the chest tube" -Pt  Chief Complaint: None  Patient/Family stated goals: Return to school    Pain/Comfort  Pain Rating 1: 6/10 (when moving )  Location - Side 1: Right  Location - Orientation 1: generalized  Location 1: chest  Pain Addressed 1: Reposition, Distraction  Pain Rating Post-Intervention 1: 6/10    Objective:  Patient " found with: peripheral IV, pulse ox (continuous), telemetry    Cognitive Exam:  Oriented to: Person, Place, Time and Situation  Follows Commands/attention: Follows multistep  commands  Communication: clear/fluent  Memory:  No Deficits noted  Safety awareness/insight to disability: Intact  Coping skills/emotional control: Appropriate to situation    Visual/perceptual:  Intact    Physical Exam:  Postural examination/scapula alignment: No postural abnormalities identified  Skin integrity: Visible skin intact  Edema: None noted     Sensation:   Intact    Upper Extremity Range of Motion:  Right Upper Extremity: WFL  Left Upper Extremity: WFL  -Pt reported tightness in R UE and chest. Given HEP with handout for UE ROM.    Upper Extremity Strength:  Right Upper Extremity: WFL  Left Upper Extremity: WFL   Strength: WFL    Fine motor coordination:   Intact    Gross motor coordination: WFL    Functional Mobility:  Bed Mobility:  Rolling/Turning to Left: Modified independent, With side rail (HOB flat)  Supine to Sit: Modified Independent (HOB flat)    Transfers:  Sit <> Stand Assistance: Independent (from EOB)  Sit <> Stand Assistive Device: No Assistive Device  Toilet Transfer Technique: Stand Pivot  Toilet Transfer Assistance: Modified Independent    Functional Ambulation: Pt performed functional mobility ~200 with [place] and no AD. PT ascended/descended 2 flights of stairs with Supervision and no AD.       Activities of Daily Living:     UE Dressing Level of Assistance: Minimum assistance (Min A to don gown like robe 2* to lines)  Grooming Position: Standing at sink  Grooming Level of Assistance: Independent (brushed teeth and washed face with cloth)       Therapeutic Activities and Exercises:  Pt educated on HEP for UE ROM. Pt educated on safety with daily tasks OOB, and importance of participating in daily ax. Whiteboard updated.      AM-PAC 6 CLICK ADL  How much help from another person does this patient  "currently need?  1 = Unable, Total/Dependent Assistance  2 = A lot, Maximum/Moderate Assistance  3 = A little, Minimum/Contact Guard/Supervision  4 = None, Modified [place]/Independent    Putting on and taking off regular lower body clothing? : 4  Bathing (including washing, rinsing, drying)?: 4  Toileting, which includes using toilet, bedpan, or urinal? : 4  Putting on and taking off regular upper body clothing?: 4  Taking care of personal grooming such as brushing teeth?: 4  Eating meals?: 4  Total Score: 24    AM-PAC Raw Score CMS "G-Code Modifier Level of Impairment Assistance   6 % Total / Unable   7 - 9 CM 80 - 100% Maximal Assist   10-14 CL 60 - 80% Moderate Assist   15 - 19 CK 40 - 60% Moderate Assist   20 - 22 CJ 20 - 40% Minimal Assist   23 CI 1-20% SBA / CGA   24 CH 0% Independent/ Mod I       Patient left up in chair with all lines intact, call button in reach, RN notified and Mother present    Assessment:  Donna Kumar is a 22 y.o. female with a medical diagnosis of Mediastinal lymphadenopathy. Pt tolerated OT and PT evaluation well and was motivated to participate in OOB activities. Pt performed self care and functional mobility tasks with high (I) and appears close to reported PLOF. OT returned in PM to give pt a HEP for reported tightness in R UE and educated on proper exercise techniques. Pt will be discharged from OT services due to high independence.     Rehab identified problem list/impairments:      Rehab potential is good.    Activity tolerance: Good    Discharge recommendations: Discharge Facility/Level Of Care Needs: home     Barriers to discharge: Barriers to Discharge: None    Equipment recommendations: none     GOALS:    Occupational Therapy Goals     Not on file          Multidisciplinary Problems (Resolved)        Problem: Occupational Therapy Goal    Goal Priority Disciplines Outcome Interventions   Occupational Therapy Goal   (Resolved)     OT, PT/OT Outcome(s) achieved "    Description:  No goals set due to pt's high (I).                    PLAN:  Plan of Care reviewed with: patient, mother      Carmencita WittKEENAN  05/30/2017

## 2017-05-30 NOTE — PROGRESS NOTES
MD on call made aware of small air leak noted in CT.  MD ordered to re-enforce dressing. Will do and continue to monitor.

## 2017-05-30 NOTE — NURSING
Gave pt discharge instructions and perscriptions. No further questions at this time. Pt stated she will walk downstairs with her mother.

## 2017-05-30 NOTE — PROGRESS NOTES
Spoke to MD on call about having multiple CT orders. Pt found with CT to wall suction. MD said to leave chest tube to wall suction for now. Orders to be carried out. Will continue to monitor.

## 2017-05-30 NOTE — PT/OT/SLP EVAL
Physical Therapy  Evaluation/discharge    Donna Kumar   MRN: [id number]   Admitting Diagnosis: Mediastinal lymphadenopathy    PT Received On: 05/30/17  PT Start Time: 0844     PT Stop Time: 0900    PT Total Time (min): 16 min       Billable Minutes:  Evaluation 1 procedure    Diagnosis: Mediastinal lymphadenopathy      Past Medical History:   Diagnosis Date    B12 deficiency     Iron deficiency     Mastocytosis     Obsessive compulsive disorder     followed by Dr. Malone      Past Surgical History:   Procedure Laterality Date    BRONCHOSCOPY      LUNG BIOPSY  03/2017       Referring physician: Vincent  Date referred to PT: 5/29/17    General Precautions: Standard, fall  Orthopedic Precautions: N/A   Braces: N/A       Do you have any cultural, spiritual, Pentecostal conflicts, given your current situation?: none noted    Patient History:  Lives With: other (see comments) (pt lives with 2 roommates at college; parents at home)  Living Arrangements: house  Home Accessibility: stairs within home  Home Layout: Bedroom on 2nd floor, Bathroom on 2nd floor  Number of Stairs Within Home: 21  Stair Railings at Home: inside, present on right side  Transportation Available: family or friend will provide  Living Environment Comment: Pt reports that she goes to college in [place] and usually lives with 2 roommates in 1 story home with BOLIVAR; once she d/c's she will live with parents in 2 story home with 21 steps + RHR to bed and bath.   Equipment Currently Used at Home: none  DME owned (not currently used): none    Previous Level of Function:  Ambulation Skills: independent  Transfer Skills: independent  ADL Skills: independent  Work/Leisure Activity: independent    Subjective:  Communicated with RN prior to session.    Chief Complaint: Pain  Patient goals: get back home    Pain/Comfort  Pain Rating 1: 6/10  Location - Side 1: Bilateral  Location - Orientation 1: generalized  Location 1: chest  Pain Addressed 1: Pre-medicate  for activity, Cessation of Activity, Nurse notified    Objective:   Patient found with: pulse ox (continuous), telemetry, peripheral IV     Cognitive Exam:  Oriented to: Person, Place, Time and Situation    Follows Commands/attention: Follows multistep  commands  Communication: clear/fluent  Safety awareness/insight to disability: intact    Physical Exam:  Postural examination/scapula alignment: No postural abnormalities identified    Skin integrity: Visible skin intact  Edema: None noted     Sensation:   Intact    Lower Extremity Range of Motion:  Right Lower Extremity: WNL  Left Lower Extremity: WNL    Lower Extremity Strength:  Right Lower Extremity: WNL  Left Lower Extremity: WNL    Functional Mobility:  Bed Mobility:  Rolling/Turning Right: Modified independent  Scooting/Bridging: Modified Independent  Supine to Sit: Modified Independent    Transfers:  Sit <> Stand Assistance: Independent  Sit <> Stand Assistive Device: No Assistive Device    Gait:   Gait Distance: 200 feet with Mod I; self propelling IV pole  Assistance 1: Modified Independent  Gait Assistive Device: No device  Gait Pattern: swing-through gait  Gait Deviation(s): decreased vishal    Stairs:  Pt ascended/descend 2 flight(s) with No Assistive Device with right with Supervision or Set-up Assistance.     Balance:   Static Sit: NORMAL: No deviations seen in posture held statically  Dynamic Sit: NORMAL: No deviations seen in posture held dynamically  Static Stand: NORMAL: No deviations seen in posture held statically  Dynamic stand: NORMAL: No deviations seen in posture held dynamically    AM-PAC 6 CLICK MOBILITY  How much help from another person does this patient currently need?   1 = Unable, Total/Dependent Assistance  2 = A lot, Maximum/Moderate Assistance  3 = A little, Minimum/Contact Guard/Supervision  4 = None, Modified [place]/Independent    Turning over in bed (including adjusting bedclothes, sheets and blankets)?: 4  Sitting down on  and standing up from a chair with arms (e.g., wheelchair, bedside commode, etc.): 4  Moving from lying on back to sitting on the side of the bed?: 4  Moving to and from a bed to a chair (including a wheelchair)?: 4  Need to walk in hospital room?: 4  Climbing 3-5 steps with a railing?: 4  Total Score: 24     AM-PAC Raw Score CMS G-Code Modifier Level of Impairment Assistance   6 % Total / Unable   7 - 9 CM 80 - 100% Maximal Assist   10 - 14 CL 60 - 80% Moderate Assist   15 - 19 CK 40 - 60% Moderate Assist   20 - 22 CJ 20 - 40% Minimal Assist   23 CI 1-20% SBA / CGA   24 CH 0% Independent/ Mod I     Patient left up in chair with all lines intact, call button in reach, RN notified and mother present.    Assessment:   Donna Kumar is a 22 y.o. female with a medical diagnosis of Mediastinal lymphadenopathy and presents with increased pain. Pt at Modified Independent/supervision level. Pt not in need of skilled PT services at this time. She is able to independently mobilize while remaining in acute. Evaluation and discharge.     Rehab identified problem list/impairments: Rehab identified problem list/impairments: pain    Activity tolerance: Excellent    Discharge recommendations: Discharge Facility/Level Of Care Needs: home     Barriers to discharge: Barriers to Discharge: None    Equipment recommendations: Equipment Needed After Discharge: none     GOALS:    Physical Therapy Goals     Not on file                PLAN:    Discharge from inpatient PT        Marie Wheat PT  05/30/2017

## 2017-05-30 NOTE — PLAN OF CARE
Problem: Occupational Therapy Goal  Goal: Occupational Therapy Goal  No goals set due to pt's high (I).  Outcome: Outcome(s) achieved Date Met: 05/30/17  Initial OT eval completed and no goals set due to high (I).    KEENAN Bagley  5/30/2017

## 2017-05-30 NOTE — PLAN OF CARE
Problem: Patient Care Overview  Goal: Plan of Care Review  Outcome: Ongoing (interventions implemented as appropriate)  POC reviewed with pt and mother who both verbalized understanding. AAOx4. VSS. Remains free of falls and injury. IVF infusing throughout shift. Right CT in place to wall suction; small air leak noted. Pain managed with PRN meds per MAR. Up with assist to bathroom. Tolerating CL diet; no complaints of nausea. Tele and cont pulse ox monitored. NV checks done Q4 with no change from baseline. No acute events. No distress noted. Will continue to monitor.

## 2017-05-30 NOTE — PROGRESS NOTES
Progress Note  Thoracic Surgery     Admit Date: 5/29/2017   LOS: 1 day     SUBJECTIVE:     Follow-up For:  Right VATS with lymph node dissection     Interval History:   NAEON. Chest tube 55 mL, no air leak. Tolerating clears. No CP/SOB/N/V. Pain controlled with prn meds.     Scheduled Meds:   ceFAZolin (ANCEF) IVPB  2 g Intravenous Q8H    cyanocobalamin  1,000 mcg Oral Daily    enoxaparin  30 mg Subcutaneous Q24H    famotidine (PF)  20 mg Intravenous Q12H    ferrous sulfate  325 mg Oral Daily    fluoxetine  10 mg Oral Daily    polyethylene glycol  17 g Oral Daily    predniSONE  30 mg Oral Daily    sodium chloride 0.9%  3 mL Intravenous Q8H     Continuous Infusions:   PRN Meds:acetaminophen, albuterol-ipratropium 2.5mg-0.5mg/3mL, aluminum-magnesium hydroxide-simethicone, bisacodyl, calcium carbonate, ibuprofen, lactulose, metoclopramide HCl, ondansetron, oxycodone, oxycodone, povidone-iodine    Review of patient's allergies indicates:   Allergen Reactions    No known drug allergies        Review of Systems  Constitutional: Negative for activity change, appetite change, chills, diaphoresis, fatigue and fever.   HENT: Negative for congestion.    Eyes: Negative for pain.   Respiratory: Negative for cough, shortness of breath and wheezing.   Cardiovascular: Negative for chest pain and palpitations.   Gastrointestinal: Negative for abdominal pain, nausea and vomiting.   Endocrine: Negative for cold intolerance and heat intolerance.   Genitourinary: Negative for difficulty urinating.   Skin: Negative for rash.   Neurological: Negative for syncope and headaches.   Hematological: Negative for adenopathy.   Psychiatric/Behavioral: Negative for confusion.    OBJECTIVE:     Vital Signs (Most Recent)  Temp: 98.9 °F (37.2 °C) (05/30/17 0742)  Pulse: 67 (05/30/17 0742)  Resp: 16 (05/30/17 0742)  BP: 111/61 (05/30/17 0742)  SpO2: 95 % (05/30/17 0742)    Vital Signs Range (Last 24H):  Temp:  [97.5 °F (36.4 °C)-98.9 °F  (37.2 °C)]   Pulse:  [57-99]   Resp:  [15-24]   BP: (102-133)/(57-76)   SpO2:  [92 %-100 %]     I & O (Last 24H):  Intake/Output Summary (Last 24 hours) at 05/30/17 1058  Last data filed at 05/30/17 0800   Gross per 24 hour   Intake          3486.67 ml   Output             2905 ml   Net           581.67 ml     Physical Exam:  General: well developed, well nourished  HENT: Head:normocephalic, atraumatic.   Neck: supple, symmetrical, trachea midline, no JVD and thyroid not enlarged, symmetric, no tenderness/mass/nodules  Lungs:  clear to auscultation bilaterally and normal respiratory effort  Cardiovascular: Heart: regular rate and rhythm, S1, S2 normal, no murmur, click, rub or gallop. Chest Wall: right sided chest wall tenderness. Right CT in place.  Extremities: no cyanosis or edema, or clubbing. Pulses: 2+ and symmetric.  Abdomen/Rectal: Abdomen: soft, non-tender non-distented; bowel sounds normal; no masses,  no organomegaly. Rectal: normal tone, no masses or tenderness and not examined  Skin: Skin color, texture, turgor normal. No rashes or lesions  Musculoskeletal:normal gait  Neurologic: Normal strength and tone. No focal numbness or weakness    Laboratory:  CBC:   Recent Labs  Lab 05/30/17  0444   WBC 12.56   RBC 4.19   HGB 11.8*   HCT 35.3*      MCV 84   MCH 28.2   MCHC 33.4     CMP:   Recent Labs  Lab 05/29/17  1350   GLU 97  97   CALCIUM 7.2*  7.2*     137   K 4.3  4.3   CO2 15*  15*     108   BUN 10  10   CREATININE 0.8  0.8       Diagnostic Results:  Labs: Reviewed  X-Ray: Reviewed   CXR : One view: Heart size is normal.  There is a right chest tube.  There is mild perihilar atelectasis.  Lungs and bowel gas are noncontributory.       ASSESSMENT/PLAN:     21 year old female with PET avid mediastinal/hilar lymphadenopathy here today to discuss surgical biopsy. BM biopsy normal. POD 1 for right VATS with lymph node biopsy     Plan:     - d/c chest tube this morning. Repeat  CXR.  - prn pain meds  - regular diet  - wean O2 for SpO2 for >90%  - continue home meds  - discharged from OT  - prn bowel regimen   PPx: enoxaparin, famotidine    Dispo: likely d/c this afternoon     AG Canales-C  Thoracic Surgery  [id number]

## 2017-05-30 NOTE — PLAN OF CARE
S/P THORACOSCOPY-VIDEO ASSISTED (VATS) (Right) DISSECTION-LYMPH NODE (Right) on 5/29/17. CM visited with pt and mother. Pt independent at home. CT has been removed. CXR will be done then depending on results pt may dc home with no dc needs.        05/30/17 1435   Discharge Assessment   Assessment Type Discharge Planning Assessment   Confirmed/corrected address and phone number on facesheet? Yes   Assessment information obtained from? Patient   Expected Length of Stay (days) 4.5   Communicated expected length of stay with patient/caregiver yes   Prior to hospitilization cognitive status: Alert/Oriented   Prior to hospitalization functional status: Independent   Current cognitive status: Alert/Oriented   Current Functional Status: Independent   Arrived From admitted as an inpatient   Able to Return to Prior Arrangements yes   Is patient able to care for self after discharge? Yes   How many people do you have in your home that can help with your care after discharge? 1   Who are your caregiver(s) and their phone number(s)? Brittany/mom [phone]   Patient's perception of discharge disposition admitted as an inpatient   Readmission Within The Last 30 Days no previous admission in last 30 days   Patient currently being followed by outpatient case management? No   Patient currently receives home health services? No   Does the patient currently use HME? No   Patient currently receives private duty nursing? No   Patient currently receives any other outside agency services? No   Equipment Currently Used at Home none   Do you have any problems affording any of your prescribed medications? No   Is the patient taking medications as prescribed? (not on any meds)   Do you have any financial concerns preventing you from receiving the healthcare you need? No   Does the patient have transportation to healthcare appointments? No   Transportation Available family or friend will provide   On Dialysis? No   Does the patient receive  services at the [facility]madin Clinic? No   Are there any open cases? No   Discharge Plan A Home with family   Discharge Plan B Home with family;Home Health   Patient/Family In Agreement With Plan yes

## 2017-05-30 NOTE — DISCHARGE SUMMARY
[facility]  Thoracic Surgery  Discharge Summary      Patient Name: Donna Kumar  MRN: [id number]  Admission Date: 5/29/2017  Hospital Length of Stay: 1 days  Discharge Date and Time:  05/30/2017 3:43 PM  Attending Physician: Moustapha Kaur MD   Discharging Provider: Azra Dalton PA-C  Primary Care Provider: Brooklynn Boyle MD     HPI:   21 y.o. female presents with PMH of mediastinal lymphadenopathy first identified in March 2017. Reports 6 month history of night sweats, chest tightness and pressure, dysphagia and SOB. She was prescribed a Z pack and then a course of Levaquin but symptoms persisted. She was referred to Dr. Kim and eventually underwent a bronch/EBUS on 3/17/17. Pathology showed acute and chronic granulomatous inflammation. Fungal and mycobacterial studies negative. On repeat chest CT the mediastinal lymphadenopathy persisted. Recently prescribed steroids which she reports has improved dysphagia and SOB. Mediastinoscopy was non diagnostic. Secondary to difficulty entering the mediastinum which had mediastinal fibrosis.  Following procedure was seen by Dr. Wright and underwent bone marrow biopsy which was benign. PET/CT on 5/23/17 significant for mediastinal and hilar adenopathy uptake with remaining normal physicological uptake.     Procedure(s) (LRB):  THORACOSCOPY-VIDEO ASSISTED (VATS) (Right)  DISSECTION-LYMPH NODE (Right)     Hospital Course:   Patient was admitted following the above mentioned procedure which she tolerated well. Extubated in the OR, transferred to the PACU then standard floor room. Output of chest tube was <75mL/24 hr thus it was removed on POD1. Small post pull pneumothorax which was unchanged on follow-up imaging and patient remained asymptomatic. Pain was controlled with prn PO pain meds. Patient tolerated a regular diet. Ambulated and voided without difficulty. Weaned to RA. Vitals remained stable. Deemed suitable for discharge.          Consults:  OT  Consults         Status Ordering Provider     Inpatient consult to Respiratory Care  Once     Provider:  (Not yet assigned)    Acknowledged MICHELINE YANES          Significant Diagnostic Studies: Labs:   BMP:   Recent Labs  Lab 05/29/17  1350 05/30/17  0444   GLU 97  97  --      137  --    K 4.3  4.3  --      108  --    CO2 15*  15*  --    BUN 10  10  --    CREATININE 0.8  0.8  --    CALCIUM 7.2*  7.2*  --    MG 2.3 2.3    and CBC   Recent Labs  Lab 05/29/17  1350 05/30/17  0444   WBC 22.25* 12.56   HGB 11.4* 11.8*   HCT 34.9* 35.3*    310     Radiology: X-Ray: CXR: X-Ray Chest 1 View (CXR):   Results for orders placed or performed during the hospital encounter of 05/29/17   X-Ray Chest 1 View    Narrative    Time of Procedure: 05/30/17 14:55:00  Accession # [id number]    Comparison: 5/30/2017, 1140 and 0550 hrs.    Number of views: 1.    Findings:  RIGHT pneumothorax persists, no smaller than at 1140 hrs.  Mediastinal structures remain midline.    No new disease identified.    Impression    Please see above.      Electronically signed by: Bibiana Gallo MD  Date:     05/30/17  Time:    15:09        Pending Diagnostic Studies:     None        Final Active Diagnoses:    Diagnosis Date Noted POA    PRINCIPAL PROBLEM:  Mediastinal lymphadenopathy [R59.0] 05/11/2017 Yes    Iron deficiency [E61.1]  Yes    B12 deficiency [E53.8]  Yes    Obsessive compulsive disorder [F42.9]  Yes      Problems Resolved During this Admission:    Diagnosis Date Noted Date Resolved POA      Discharged Condition: good    Disposition: Home or Self Care    Follow Up:  Follow-up Information     Moustapha Kaur MD In 1 week.    Specialty:  Cardiothoracic Surgery  Contact information:  [address] [place] [id number] [phone]                 Patient Instructions:     Diet general     Activity as tolerated     Lifting restrictions   Order Comments: No more than 20 pounds      Shower on day dressing removed (No bath)     Call MD for:  temperature >100.4     Call MD for:  persistent nausea and vomiting or diarrhea     Call MD for:  severe uncontrolled pain     Call MD for:  redness, tenderness, or signs of infection (pain, swelling, redness, odor or green/yellow discharge around incision site)     Call MD for:  difficulty breathing or increased cough     Call MD for:  severe persistent headache     Call MD for:  worsening rash     Call MD for:  persistent dizziness, light-headedness, or visual disturbances     Call MD for:  increased confusion or weakness     Remove dressing in 24 hours       Medications:  Reconciled Home Medications:   Current Discharge Medication List      START taking these medications    Details   oxycodone-acetaminophen (PERCOCET) 5-325 mg per tablet Take 1 tablet by mouth every 4 (four) hours as needed for Pain.  Qty: 51 tablet, Refills: 0         CONTINUE these medications which have NOT CHANGED    Details   cyanocobalamin (VITAMIN B-12) 1000 MCG tablet Take 1 tablet (1,000 mcg total) by mouth once daily.  Qty: 90 tablet, Refills: 3    Associated Diagnoses: Vitamin B 12 deficiency      ferrous sulfate 325 mg (65 mg iron) Tab tablet Take 1 tablet (325 mg total) by mouth daily with breakfast.  Qty: 90 tablet, Refills: 0    Associated Diagnoses: Iron deficiency anemia due to chronic blood loss      fluoxetine 10 MG Tab Take 1 tablet (10 mg total) by mouth once daily.  Qty: 30 tablet, Refills: 3    Associated Diagnoses: Obsessive-compulsive disorder, unspecified type      norethindrone-ethinyl estradiol (OVCON) 0.4-35 mg-mcg per tablet Take 1 tablet by mouth once daily.  Qty: 30 tablet, Refills: 11    Associated Diagnoses: Encounter for contraceptive management, unspecified contraceptive encounter type      predniSONE (DELTASONE) 20 MG tablet Take 40mg (two tablets) daily for two weeks then 20 mg daily until seen in clinic.  Qty: 50 tablet, Refills: 1       sulfacetamide sodium-sulfur (SULFACLEANSE 8-4) 8-4 % Susp Wash face qhs  Qty: 473 mL, Refills: 3    Associated Diagnoses: Acne vulgaris      ACZONE 5 % topical gel Apply topically every morning.  Qty: 60 g, Refills: 3    Associated Diagnoses: Acne vulgaris      tazarotene (TAZORAC) 0.05 % Crea cream Apply topically every evening. Apply thin film to face qhs after moisturizing.  Qty: 60 g, Refills: 1    Associated Diagnoses: Acne vulgaris             Azra Dalton PA-C  Thoracic Surgery  [facility]

## 2017-05-31 ENCOUNTER — OFFICE VISIT (OUTPATIENT)
Dept: PLASTIC SURGERY | Facility: CLINIC | Age: 22
End: 2017-05-31
Payer: COMMERCIAL

## 2017-05-31 VITALS
HEIGHT: 63 IN | BODY MASS INDEX: 23.51 KG/M2 | TEMPERATURE: 98 F | DIASTOLIC BLOOD PRESSURE: 73 MMHG | HEART RATE: 61 BPM | SYSTOLIC BLOOD PRESSURE: 105 MMHG | WEIGHT: 132.69 LBS

## 2017-05-31 DIAGNOSIS — D23.9: ICD-10-CM

## 2017-05-31 DIAGNOSIS — L72.0 EPIDERMAL CYST OF FACE: Primary | ICD-10-CM

## 2017-05-31 PROCEDURE — 99214 OFFICE O/P EST MOD 30 MIN: CPT | Mod: PBBFAC,PO | Performed by: PHYSICIAN ASSISTANT

## 2017-05-31 PROCEDURE — 99999 PR PBB SHADOW E&M-EST. PATIENT-LVL IV: CPT | Mod: PBBFAC,,, | Performed by: PHYSICIAN ASSISTANT

## 2017-05-31 PROCEDURE — 99203 OFFICE O/P NEW LOW 30 MIN: CPT | Mod: S$GLB,,, | Performed by: PHYSICIAN ASSISTANT

## 2017-05-31 NOTE — PROGRESS NOTES
Donna Kumar [facility] to [facility] on 5[facility]31/2017 for a follow up visit for consult regarding cyst of Left forehead. She is here today to discuss removal. The procedure was discussed in detail with the patient as were the risks and possible complications. She understands that the risks include but are not limited to bleeding, scarring, infection, numbness, asymmetry, deformity, open wound, recurrence, skin necrosis, wound dehiscence, hematoma and need for further surgery. She understands that this will be done by myself and/or Dr. Charli Romero  Under straight local anesthesia. Recently (5/29/2017) she underwent a VATS secondary to mediastinal adenopathy with Dr ricketts, pathology pending.     Review of patient's allergies indicates:   Allergen Reactions    No known drug allergies      Current Outpatient Prescriptions on File Prior to Visit   Medication Sig Dispense Refill    ACZONE 5 % topical gel Apply topically every morning. 60 g 3    cyanocobalamin (VITAMIN B-12) 1000 MCG tablet Take 1 tablet (1,000 mcg total) by mouth once daily. 90 tablet 3    ferrous sulfate 325 mg (65 mg iron) Tab tablet Take 1 tablet (325 mg total) by mouth daily with breakfast. 90 tablet 0    fluoxetine 10 MG Tab Take 1 tablet (10 mg total) by mouth once daily. (Patient taking differently: Take 10 mg by mouth once daily. ) 30 tablet 3    norethindrone-ethinyl estradiol (OVCON) 0.4-35 mg-mcg per tablet Take 1 tablet by mouth once daily. 30 tablet 11    oxycodone-acetaminophen (PERCOCET) 5-325 mg per tablet Take 1 tablet by mouth every 4 (four) hours as needed for Pain. 51 tablet 0    predniSONE (DELTASONE) 20 MG tablet Take 40mg (two tablets) daily for two weeks then 20 mg daily until seen in clinic. (Patient taking differently: Take 30 mg by mouth once daily. Take 40mg (two tablets) daily for two weeks then 20 mg daily until seen in clinic.) 50 tablet 1    sulfacetamide sodium-sulfur (SULFACLEANSE 8-4) 8-4  % Susp Wash face qhs 473 mL 3    tazarotene (TAZORAC) 0.05 % Crea cream Apply topically every evening. Apply thin film to face qhs after moisturizing. 60 g 1     Current Facility-Administered Medications on File Prior to Visit   Medication Dose Route Frequency Provider Last Rate Last Dose    [COMPLETED] cefazolin (ANCEF) 2 gram in dextrose 5% 50 mL IVPB (premix)  2 g Intravenous Q8H Moustapha Kaur  mL/hr at 05/30/17 1105 2 g at 05/30/17 1105    [DISCONTINUED] acetaminophen tablet 1,000 mg  1,000 mg Oral Q8H PRN Jennifer Lock MD   1,000 mg at 05/30/17 0203    [DISCONTINUED] albuterol-ipratropium 2.5mg-0.5mg/3mL nebulizer solution 3 mL  3 mL Nebulization Q4H PRN Channing Saucedo MD        [DISCONTINUED] aluminum-magnesium hydroxide-simethicone 200-200-20 mg/5 mL suspension 30 mL  30 mL Oral Q6H PRN Channing Saucedo MD        [DISCONTINUED] bisacodyl suppository 10 mg  10 mg Rectal Daily PRN Channing Saucedo MD        [DISCONTINUED] calcium carbonate 200 mg calcium (500 mg) chewable tablet 1,000 mg  1,000 mg Oral Q8H PRN Channing Saucedo MD        [DISCONTINUED] cyanocobalamin tablet 1,000 mcg  1,000 mcg Oral Daily Channing Saucedo MD   1,000 mcg at 05/30/17 0819    [DISCONTINUED] enoxaparin injection 30 mg  30 mg Subcutaneous Q24H Channing Saucedo MD   30 mg at 05/30/17 1348    [DISCONTINUED] famotidine (PF) injection 20 mg  20 mg Intravenous Q12H Channing Saucedo MD   20 mg at 05/30/17 0819    [DISCONTINUED] ferrous sulfate EC tablet 325 mg  325 mg Oral Daily Channing Saucedo MD   325 mg at 05/30/17 0820    [DISCONTINUED] fluoxetine capsule 10 mg  10 mg Oral Daily Channing Saucedo MD   10 mg at 05/30/17 0820    [DISCONTINUED] ibuprofen tablet 600 mg  600 mg Oral Q6H PRN Jennifer Lock MD   600 mg at 05/30/17 0558    [DISCONTINUED] lactulose 20 gram/30 mL solution Soln 20 g  20 g Oral Q6H PRN Channing Aguila  MD Lewis        [DISCONTINUED] metoclopramide HCl injection 5 mg  5 mg Intravenous Q6H PRN Channing Saucedo MD        [DISCONTINUED] ondansetron disintegrating tablet 8 mg  8 mg Oral Q8H PRN Channing Saucedo MD        [DISCONTINUED] oxycodone immediate release tablet 10 mg  10 mg Oral Q4H PRN Channing Saucedo MD   10 mg at 05/30/17 0818    [DISCONTINUED] oxycodone immediate release tablet 5 mg  5 mg Oral Q4H PRN Channing Saucedo MD   5 mg at 05/30/17 1348    [DISCONTINUED] polyethylene glycol packet 17 g  17 g Oral Daily Channing Saucedo MD   17 g at 05/30/17 0818    [DISCONTINUED] povidone-iodine 10 % ointment   Topical PRN Channing Saucedo MD        [DISCONTINUED] predniSONE tablet 30 mg  30 mg Oral Daily Channing Saucedo MD   30 mg at 05/30/17 0819    [DISCONTINUED] sodium chloride 0.9% flush 3 mL  3 mL Intravenous Q8H Channing Saucedo MD   3 mL at 05/30/17 1348     Patient Active Problem List   Diagnosis    Foot pain    Obsessive compulsive disorder    Iron deficiency    B12 deficiency    Mediastinal lymphadenopathy    Adenopathy     Past Surgical History:   Procedure Laterality Date    BRONCHOSCOPY      LUNG BIOPSY  03/2017     PHYSICAL EXAMINATION:    Vitals:    05/31/17 0831   BP: 105/73   Pulse: 61   Temp: 98.3 °F (36.8 °C)     WD WN NAD  AAOx3  VSS  Lungs - normal resp effort  Face - cyst of L forehead 0.5cm x 0.5cm, nontender, no erythema, freely moveable  Skin - warm/dry, no rash    Physical Exam     Face  Her facial skeleton is symmetrical. She shows normal skin tone. Her facial expression is fully symmetric. She has facial weakness in the following areas: none on the right, and none on the left.         Forehead and Brow  Forehead skin is normal.         ASSESSMENT/PLAN -     22 y.o. F with cyst of L forehead, desiring removal  - cyst of L forehead

## 2017-06-01 ENCOUNTER — TELEPHONE (OUTPATIENT)
Dept: CARDIOTHORACIC SURGERY | Facility: CLINIC | Age: 22
End: 2017-06-01

## 2017-06-01 NOTE — TELEPHONE ENCOUNTER
Called to check on the pt, spoke with pt's mother. She reports the pt is doing well. She saw Dr. Kaur yesterday and he redressed her incision area and gave her some supplies to take home. Pt's mother is agreeable to f/u on 6/7 with CXR and Dr. Ace. Will call her if pathology is not back for the appt.  She agrees to call with any needs prior to f/u.

## 2017-06-02 ENCOUNTER — PROCEDURE VISIT (OUTPATIENT)
Dept: PLASTIC SURGERY | Facility: CLINIC | Age: 22
End: 2017-06-02
Payer: COMMERCIAL

## 2017-06-02 DIAGNOSIS — L72.0 EPIDERMAL CYST OF FACE: Primary | ICD-10-CM

## 2017-06-02 LAB
BLD PROD TYP BPU: NORMAL
BLD PROD TYP BPU: NORMAL
BLOOD UNIT EXPIRATION DATE: NORMAL
BLOOD UNIT EXPIRATION DATE: NORMAL
BLOOD UNIT TYPE CODE: 5100
BLOOD UNIT TYPE CODE: 5100
BLOOD UNIT TYPE: NORMAL
BLOOD UNIT TYPE: NORMAL
CODING SYSTEM: NORMAL
CODING SYSTEM: NORMAL
DISPENSE STATUS: NORMAL
DISPENSE STATUS: NORMAL
HHV8 IGG SER QL IF: NORMAL
TRANS ERYTHROCYTES VOL PATIENT: NORMAL ML
TRANS ERYTHROCYTES VOL PATIENT: NORMAL ML

## 2017-06-02 PROCEDURE — 88304 TISSUE EXAM BY PATHOLOGIST: CPT | Performed by: PATHOLOGY

## 2017-06-02 PROCEDURE — 12051 INTMD RPR FACE/MM 2.5 CM/<: CPT | Mod: S$GLB,,, | Performed by: SURGERY

## 2017-06-02 PROCEDURE — 88304 TISSUE EXAM BY PATHOLOGIST: CPT | Mod: 26,,, | Performed by: PATHOLOGY

## 2017-06-02 PROCEDURE — 11440 EXC FACE-MM B9+MARG 0.5 CM/<: CPT | Mod: 59,S$GLB,, | Performed by: SURGERY

## 2017-06-02 NOTE — PROCEDURES
PREOPERATIVE DIAGNOSIS:  Cyst of the forehead.    POSTOPERATIVE DIAGNOSIS:  Cyst of the forehead.    PROCEDURE PERFORMED:  Excision of cyst of the forehead measuring 1 x 1 cm with   layered closure, final incision length 1 cm.    SURGEON:  Charli Romero MD, [facility]    ANESTHESIA:  Local.    PROCEDURE IN DETAIL:  The patient was placed in the supine position.  After she   was prepped and draped, was infiltrated with 1% lidocaine with epinephrine.  An   elliptical incision around the cyst was then made.  It was deepened down.  The   cyst was large down to the periosteum.  It was completely excised.  Sent to   pathology.  The incision was closed using interrupted 5-0 Monocryl sutures   followed by running 5-0 Monocryl subcuticular suture, followed by Dermabond skin   glue.  There were no complications.      IRMA/CLINTON  dd: 06/02/2017 10:16:11 (CDT)  td: 06/02/2017 15:10:07 (MERET)  Doc ID   #[id number]  Job ID #[id number]    CC:

## 2017-06-03 LAB
Lab: <1000 COPIES/ML
Lab: NORMAL

## 2017-06-07 ENCOUNTER — OFFICE VISIT (OUTPATIENT)
Dept: CARDIOTHORACIC SURGERY | Facility: CLINIC | Age: 22
End: 2017-06-07
Payer: COMMERCIAL

## 2017-06-07 ENCOUNTER — HOSPITAL ENCOUNTER (OUTPATIENT)
Dept: RADIOLOGY | Facility: HOSPITAL | Age: 22
Discharge: HOME | End: 2017-06-07
Attending: THORACIC SURGERY (CARDIOTHORACIC VASCULAR SURGERY)
Payer: COMMERCIAL

## 2017-06-07 ENCOUNTER — OFFICE VISIT (OUTPATIENT)
Dept: INFECTIOUS DISEASES | Facility: CLINIC | Age: 22
End: 2017-06-07
Payer: COMMERCIAL

## 2017-06-07 VITALS
HEART RATE: 70 BPM | HEIGHT: 63 IN | SYSTOLIC BLOOD PRESSURE: 115 MMHG | TEMPERATURE: 98 F | BODY MASS INDEX: 23.48 KG/M2 | WEIGHT: 132.5 LBS | DIASTOLIC BLOOD PRESSURE: 79 MMHG

## 2017-06-07 VITALS
SYSTOLIC BLOOD PRESSURE: 114 MMHG | BODY MASS INDEX: 23.68 KG/M2 | OXYGEN SATURATION: 100 % | DIASTOLIC BLOOD PRESSURE: 77 MMHG | WEIGHT: 133.63 LBS | HEART RATE: 75 BPM | HEIGHT: 63 IN

## 2017-06-07 DIAGNOSIS — R59.0 MEDIASTINAL LYMPHADENOPATHY: Primary | ICD-10-CM

## 2017-06-07 DIAGNOSIS — R59.0 MEDIASTINAL LYMPHADENOPATHY: ICD-10-CM

## 2017-06-07 PROCEDURE — 99999 PR PBB SHADOW E&M-EST. PATIENT-LVL III: CPT | Mod: PBBFAC,,, | Performed by: INTERNAL MEDICINE

## 2017-06-07 PROCEDURE — 99024 POSTOP FOLLOW-UP VISIT: CPT | Mod: S$GLB,,, | Performed by: THORACIC SURGERY (CARDIOTHORACIC VASCULAR SURGERY)

## 2017-06-07 PROCEDURE — 99214 OFFICE O/P EST MOD 30 MIN: CPT | Mod: S$GLB,,, | Performed by: INTERNAL MEDICINE

## 2017-06-07 PROCEDURE — 99999 PR PBB SHADOW E&M-EST. PATIENT-LVL III: CPT | Mod: PBBFAC,,, | Performed by: THORACIC SURGERY (CARDIOTHORACIC VASCULAR SURGERY)

## 2017-06-07 PROCEDURE — 71020 XR CHEST PA AND LATERAL: CPT | Mod: TC

## 2017-06-07 PROCEDURE — 71020 XR CHEST PA AND LATERAL: CPT | Mod: 26,,, | Performed by: RADIOLOGY

## 2017-06-07 NOTE — Clinical Note
Could you see this young girl in consultation?  Mediastinal mass with necrotizing granulomas and likely not infection.  Thanks, Camila

## 2017-06-07 NOTE — PROGRESS NOTES
Subjective:       Patient ID: Donna Kumar is a 22 y.o. female.    Chief Complaint: Post-op Evaluation    HPI   21 y.o. female presents with PMH of mediastinal lymphadenopathy first identified in March 2017. Reports 6 month history of night sweats, chest tightness and pressure, dysphagia and SOB. She was prescribed a Z pack and then a course of Levaquin but symptoms persisted. She was referred to Dr. Kim and eventually underwent a bronch/EBUS on 3/17/17. Pathology showed acute and chronic granulomatous inflammation. Fungal and mycobacterial studies negative. On repeat chest CT the mediastinal lymphadenopathy persisted. Recently prescribed steroids which she reports has improved dysphagia and SOB. Mediastinoscopy was non diagnostic. Secondary to difficulty entering the mediastinum which had mediastinal fibrosis.  Following procedure was seen by Dr. Wright and underwent bone marrow biopsy which was benign. PET/CT on 5/23/17 significant for mediastinal and hilar adenopathy uptake with remaining normal physicological uptake.     Interval History:   Doing well since Right VATS with LN biopsy on 5/29/17. Doing well post-operatively. Notes weaned off pain medications. Still tapering prednisone. Currently on 20mg. No fever or chills. No SOB, cough, CP, nausea, vomiting.       Review of Systems   Constitutional: Negative for activity change, chills, fatigue and fever.   HENT: Negative for congestion.    Eyes: Negative for pain.   Respiratory: Negative for cough, shortness of breath and wheezing.    Cardiovascular: Positive for chest pain (occasional anterior pleuritc pain ). Negative for palpitations.   Gastrointestinal: Negative for abdominal pain, nausea and vomiting.   Endocrine: Negative for cold intolerance and heat intolerance.   Genitourinary: Negative for difficulty urinating.   Skin: Negative for rash.   Neurological: Negative for syncope and headaches.   Hematological: Negative for adenopathy.    Psychiatric/Behavioral: Negative for confusion.         Objective:      Physical Exam   Constitutional: She is oriented to person, place, and time. She appears well-developed and well-nourished. No distress.   HENT:   Head: Normocephalic and atraumatic.   Eyes: EOM are normal.   Neck: Normal range of motion. Neck supple.   Cardiovascular: Normal rate, regular rhythm and normal heart sounds.    Pulmonary/Chest: Effort normal and breath sounds normal. No respiratory distress. She has no wheezes. She exhibits no tenderness.   Abdominal: Soft. She exhibits no distension.   Lymphadenopathy:     She has no cervical adenopathy.   Neurological: She is alert and oriented to person, place, and time.   Skin: Skin is warm and dry.   Right VATS incisions healing well. Steri strips removed in clinic.   Most inferior incision with skin separation, good granulation tissue, no surrounding erythema or tenderness.         Psychiatric: She has a normal mood and affect. Thought content normal.   Vitals reviewed.      Pathology:   LN with necrotizing granulomatous inflammation. No carcinoma identified.    RARE GMS POSITIVITY IDENTIFIED, SUSPICIOUS FOR FUNGAL INFECTION.   SPECIAL STAINS (AFB AND PAS) ARE NEGATIVE.  THE CONTROLS FOR ALL SPECIAL STAINS ARE PROPER.  AE1/3 IMMUNOSTAIN CONFIRMATORY, WITH PROPER POSITIVE AND NEGATIVE CONTROLS    CXR 6/7/17:  2 views: Heart size is normal.  Lungs are clear.    Assessment:       21 y.o. female presents with PMH of mediastinal lymphadenopathy s/p mediastinoscopy which was non diagnostic and recent Right VATS with LN biopsy. Pathology reviewed with patient.     Plan:       RTC prn   Can use Bactroban on most inferior incision until skin approximates.      ATTENDING ATTESTATION:    I evaluated the patient and I agree with the assessment and plan.  Awaiting PCR analysis of cell block to identify possible fungal organism.  Patient should follow up with Ramsey Kim and Kyle. RTC prn

## 2017-06-07 NOTE — PROGRESS NOTES
Subjective:      Patient ID: Donna Kumar is a 22 y.o. female.    Chief Complaint:Follow-up      History of Present Illness    22 year old with drenching night sweats dating back to late 11/2016; around Thanksgiving.  Had URI symptoms around early February and was given a Z-pack/no steroids.  3/9/2017: chest pain on deep breathing; subjective fever. Given levaquin Rx.    pression - chest x-ray 3/9   Asymmetrical chronic granulomatous change right hilum.  Old chest radiographs for comparison, consideration for repeat chest exam post therapy versus CT scan of chest with IV contrast as clinically indicated, suggested as well as clinical correlation.     Impression - CT scan 3/9      1.  Large right hilar and subcarinal necrotic lymph node enlargement with associated mild consolidation with air bronchogram in the right middle lobe as described above.  Given the patient's age, this is favored to be related to infectious process with reactive lymph node enlargement.  However, given the large size of subcarinal lymph node, a neoplastic process cannot be excluded.  Correlation and followup to resolution are advised.  Further evaluation as clinically indicated.    2.  Vague area of hyper enhancement in segment 7 of the liver which could relate to FNH or adenoma.  Further evaluation with liver mass protocol MRI with Eovist is recommended.    3.  Nonspecific splenic lesions which could be further characterized the time of MRI as well.     4.  Narrowing of the bronchus intermedius from mass effect of subcarinal lymph node as above.       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 No pets.    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    Component      Latest Ref Rng & Units 3/16/2017 3/9/2017 7/20/2016    Hemoglobin      12.0 - 16.0 g/dL 11.9 (L) 11.1 (L) 13.0   Hematocrit      37.0 - 48.5 % 36.5 (L) 34.2 (L) 40.9     Component      Latest Ref Rng & Units 3/16/2017 3/9/2017 7/20/2016   Gran%      38.0 - 73.0 % 79.8 (H) 76.8 (H) 69.7   Lymph%      18.0 - 48.0 % 12.6 (L) 14.6 (L) 23.4     Component      Latest Ref Rng & Units 3/16/2017   Iron      30 - 160 ug/dL 33   Transferrin      200 - 375 mg/dL 365   TIBC      250 - 450 ug/dL 540 (H)   Saturated Iron      20 - 50 % 6 (L)     Component      Latest Ref Rng & Units 3/16/2017   Vitamin B-12      210 - 950 pg/mL 165 (L)     Component      Latest Ref Rng & Units 3/17/2017   EBV VCA IgG      <1:10 Titer 1:160 (A)   EBV VCA IgM      <1:10 Titer <1:10   EBV Early Antigen Ab, IgG      <1:10 Titer 1:10 (A)   EBV Nuclear Ag Ab      <1:5 Titer 1:20 (A)     Path:    Was started on steroids and felt somewhat better and breathing improved.    Still on pred - 20 mg daily.  Most recent surgery still reveals: necrotizing granulomas  Reviewed with Dr. Asif - likely not sarcoid; not castleman's; awaiting further stains;   No cultures done with last surgery.       Review of Systems   Constitution: Negative for chills, decreased appetite, fever, weakness, malaise/fatigue, night sweats, weight gain and weight loss.   HENT: Negative for congestion, ear pain, headaches, hearing loss, hoarse voice, sore throat and tinnitus.    Eyes: Positive for visual disturbance. Negative for blurred vision and redness.   Cardiovascular: Negative for chest pain, leg swelling and palpitations.   Respiratory: Negative for cough, hemoptysis, shortness of breath and sputum production.    Hematologic/Lymphatic: Negative for adenopathy. Does not bruise/bleed easily.   Skin: Negative for dry skin, itching, rash and suspicious lesions.   Musculoskeletal: Negative for back pain, joint pain, myalgias and neck pain.   Gastrointestinal: Negative for abdominal pain, constipation, diarrhea, heartburn, nausea and  vomiting.   Genitourinary: Negative for dysuria, flank pain, frequency, hematuria, hesitancy and urgency.   Neurological: Negative for dizziness, numbness and paresthesias.   Psychiatric/Behavioral: Negative for depression and memory loss. The patient does not have insomnia and is not nervous/anxious.      Objective:   Physical Exam   Constitutional: She appears well-developed and well-nourished.   Cardiovascular: Normal rate.    Pulmonary/Chest: Effort normal.   Abdominal: Soft.   Skin: Skin is warm and dry.   Vitals reviewed.    Assessment:       1. Mediastinal lymphadenopathy        Reviewed GMS and likely not fungi present; perhaps some Candida but this will likely not be the cause and would only be a colonizer.     Plan:       1. Spoke with Dr. John Asif and the specimen will be sent for fungal sequencing; TB PCR again; and a consultation to path to review case.   2. Serologies for Bartonella today; will defer repeating additional fungal serologies as they have been negative in the past.  3. Will ask rheumatology to evaluate patient.

## 2017-06-08 ENCOUNTER — TELEPHONE (OUTPATIENT)
Dept: PLASTIC SURGERY | Facility: CLINIC | Age: 22
End: 2017-06-08

## 2017-06-08 NOTE — TELEPHONE ENCOUNTER
Spoke with patient. Doing well. No issues or concerns. Discussed benign pathology of forehead cyst. Will RTC PRN

## 2017-06-15 ENCOUNTER — TELEPHONE (OUTPATIENT)
Dept: RHEUMATOLOGY | Facility: CLINIC | Age: 22
End: 2017-06-15

## 2017-06-17 DIAGNOSIS — D50.0 IRON DEFICIENCY ANEMIA DUE TO CHRONIC BLOOD LOSS: ICD-10-CM

## 2017-06-18 RX ORDER — FERROUS SULFATE 325(65) MG
325 TABLET ORAL
Qty: 90 TABLET | Refills: 0 | Status: SHIPPED | OUTPATIENT
Start: 2017-06-18 | End: 2017-06-28 | Stop reason: SDUPTHER

## 2017-06-20 ENCOUNTER — OFFICE VISIT (OUTPATIENT)
Dept: INFECTIOUS DISEASES | Facility: CLINIC | Age: 22
End: 2017-06-20
Payer: COMMERCIAL

## 2017-06-20 ENCOUNTER — TELEPHONE (OUTPATIENT)
Dept: INFECTIOUS DISEASES | Facility: CLINIC | Age: 22
End: 2017-06-20

## 2017-06-20 VITALS
DIASTOLIC BLOOD PRESSURE: 79 MMHG | WEIGHT: 132 LBS | HEIGHT: 63 IN | BODY MASS INDEX: 23.39 KG/M2 | HEART RATE: 93 BPM | TEMPERATURE: 98 F | SYSTOLIC BLOOD PRESSURE: 112 MMHG

## 2017-06-20 DIAGNOSIS — R59.0 MEDIASTINAL ADENOPATHY: Primary | ICD-10-CM

## 2017-06-20 PROCEDURE — 99999 PR PBB SHADOW E&M-EST. PATIENT-LVL III: CPT | Mod: PBBFAC,,, | Performed by: INTERNAL MEDICINE

## 2017-06-20 PROCEDURE — 99214 OFFICE O/P EST MOD 30 MIN: CPT | Mod: S$GLB,,, | Performed by: INTERNAL MEDICINE

## 2017-06-20 RX ORDER — SULFAMETHOXAZOLE AND TRIMETHOPRIM 800; 160 MG/1; MG/1
1 TABLET ORAL
Qty: 30 TABLET | Refills: 6 | Status: SHIPPED | OUTPATIENT
Start: 2017-06-21 | End: 2018-03-17

## 2017-06-20 RX ORDER — ITRACONAZOLE 100 MG/1
200 CAPSULE ORAL 2 TIMES DAILY
Qty: 60 CAPSULE | Refills: 6 | Status: SHIPPED | OUTPATIENT
Start: 2017-06-20 | End: 2018-03-17

## 2017-06-20 NOTE — PATIENT INSTRUCTIONS
Itraconazole:  Day 1 and 2 - three times a day for loading; then twice a day.  Level on week 1 or 2; need lab to send orders  Monitoring of liver function tests - once a month    Bactrim:  For prevention  Monday, Wednesday and Friday    CT chest in 6-8 weeks

## 2017-06-20 NOTE — PROGRESS NOTES
Subjective:      Patient ID: Donna Kumar is a 22 y.o. female.    Chief Complaint:Follow-up      History of Present Illness    22 year old with drenching night sweats dating back to late 11/2016; around Thanksgiving.  Had URI symptoms around early February and was given a Z-pack/no steroids.  3/9/2017: chest pain on deep breathing; subjective fever. Given levaquin Rx.    pression - chest x-ray 3/9   Asymmetrical chronic granulomatous change right hilum.  Old chest radiographs for comparison, consideration for repeat chest exam post therapy versus CT scan of chest with IV contrast as clinically indicated, suggested as well as clinical correlation.     Impression - CT scan 3/9      1.  Large right hilar and subcarinal necrotic lymph node enlargement with associated mild consolidation with air bronchogram in the right middle lobe as described above.  Given the patient's age, this is favored to be related to infectious process with reactive lymph node enlargement.  However, given the large size of subcarinal lymph node, a neoplastic process cannot be excluded.  Correlation and followup to resolution are advised.  Further evaluation as clinically indicated.    2.  Vague area of hyper enhancement in segment 7 of the liver which could relate to FNH or adenoma.  Further evaluation with liver mass protocol MRI with Eovist is recommended.    3.  Nonspecific splenic lesions which could be further characterized the time of MRI as well.     4.  Narrowing of the bronchus intermedius from mass effect of subcarinal lymph node as above.       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 No pets.    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    Component      Latest Ref Rng & Units 3/16/2017 3/9/2017 7/20/2016    Hemoglobin      12.0 - 16.0 g/dL 11.9 (L) 11.1 (L) 13.0   Hematocrit      37.0 - 48.5 % 36.5 (L) 34.2 (L) 40.9     Component      Latest Ref Rng & Units 3/16/2017 3/9/2017 7/20/2016   Gran%      38.0 - 73.0 % 79.8 (H) 76.8 (H) 69.7   Lymph%      18.0 - 48.0 % 12.6 (L) 14.6 (L) 23.4     Component      Latest Ref Rng & Units 3/16/2017   Iron      30 - 160 ug/dL 33   Transferrin      200 - 375 mg/dL 365   TIBC      250 - 450 ug/dL 540 (H)   Saturated Iron      20 - 50 % 6 (L)     Component      Latest Ref Rng & Units 3/16/2017   Vitamin B-12      210 - 950 pg/mL 165 (L)     Component      Latest Ref Rng & Units 3/17/2017   EBV VCA IgG      <1:10 Titer 1:160 (A)   EBV VCA IgM      <1:10 Titer <1:10   EBV Early Antigen Ab, IgG      <1:10 Titer 1:10 (A)   EBV Nuclear Ag Ab      <1:5 Titer 1:20 (A)     Path:    Was started on steroids and felt somewhat better and breathing improved.    Still on pred - 20 mg daily.  Most recent surgery still reveals: necrotizing granulomas  Reviewed with Dr. Asif - likely not sarcoid; not castleman's;all stains negative.  No cultures done with last surgery.   Patient presented to [facility] and histoplasmosis is currently considered due to serologies being positive.     Review of Systems   Constitution: Negative for chills, decreased appetite, fever, weakness, malaise/fatigue, night sweats, weight gain and weight loss.   HENT: Negative for congestion, ear pain, headaches, hearing loss, hoarse voice, sore throat and tinnitus.    Eyes: Positive for visual disturbance. Negative for blurred vision and redness.   Cardiovascular: Negative for chest pain, leg swelling and palpitations.   Respiratory: Negative for cough, hemoptysis, shortness of breath and sputum production.    Hematologic/Lymphatic: Negative for adenopathy. Does not bruise/bleed easily.   Skin: Negative for dry skin, itching, rash and suspicious lesions.   Musculoskeletal: Negative for back pain, joint pain, myalgias and neck pain.    Gastrointestinal: Negative for abdominal pain, constipation, diarrhea, heartburn, nausea and vomiting.   Genitourinary: Negative for dysuria, flank pain, frequency, hematuria, hesitancy and urgency.   Neurological: Negative for dizziness, numbness and paresthesias.   Psychiatric/Behavioral: Negative for depression and memory loss. The patient does not have insomnia and is not nervous/anxious.      Objective:   Physical Exam   Constitutional: She is oriented to person, place, and time. She appears well-developed and well-nourished.   HENT:   Head: Normocephalic and atraumatic.   Right Ear: External ear normal.   Left Ear: External ear normal.   Nose: Nose normal.   Mouth/Throat: Oropharynx is clear and moist.   Eyes: Conjunctivae and EOM are normal. Pupils are equal, round, and reactive to light.   Neck: Normal range of motion. Neck supple. No thyromegaly present.   Cardiovascular: Normal rate, regular rhythm, normal heart sounds and intact distal pulses.    No murmur heard.  Pulmonary/Chest: Effort normal and breath sounds normal. No respiratory distress. She has no wheezes. She has no rales.   Abdominal: Soft. Bowel sounds are normal. There is no tenderness.   Musculoskeletal: Normal range of motion.   Lymphadenopathy:     She has no cervical adenopathy.   Neurological: She is alert and oriented to person, place, and time.   Skin: Skin is warm and dry.   Psychiatric: She has a normal mood and affect. Her behavior is normal. Judgment normal.     Assessment:       1. Mediastinal adenopathy          Plan:       1. Additional labs for histoplasmosis (serum and urine histo negative).  2. Lab contacted and histo PCR to be done on formalin-fixed tissue.  3. Start itraconazole although this may not help at this time; would prevent re-activation in the present of prednisone.      4. CT chest in 6-8 weeks.

## 2017-06-28 DIAGNOSIS — D50.0 IRON DEFICIENCY ANEMIA DUE TO CHRONIC BLOOD LOSS: ICD-10-CM

## 2017-06-28 RX ORDER — FERROUS SULFATE 325(65) MG
325 TABLET ORAL
Qty: 90 TABLET | Refills: 0 | Status: SHIPPED | OUTPATIENT
Start: 2017-06-28 | End: 2018-03-17

## 2017-07-04 ENCOUNTER — TELEPHONE (OUTPATIENT)
Dept: INFECTIOUS DISEASES | Facility: CLINIC | Age: 22
End: 2017-07-04

## 2017-07-04 RX ORDER — PREDNISONE 20 MG/1
20 TABLET ORAL DAILY
Qty: 30 TABLET | Refills: 0 | Status: SHIPPED | OUTPATIENT
Start: 2017-07-04 | End: 2017-07-14

## 2017-07-07 ENCOUNTER — PATIENT MESSAGE (OUTPATIENT)
Dept: INFECTIOUS DISEASES | Facility: CLINIC | Age: 22
End: 2017-07-07

## 2017-07-08 ENCOUNTER — TELEPHONE (OUTPATIENT)
Dept: INFECTIOUS DISEASES | Facility: CLINIC | Age: 22
End: 2017-07-08

## 2017-07-08 NOTE — TELEPHONE ENCOUNTER
Will treat with sporanox for at least 3 months and perhaps extended to 6.  Prednisone at 20 til CT chest repeated.  Then, will taper slowly.  Will plan CT q 6 months thereafter.

## 2017-07-17 ENCOUNTER — PATIENT MESSAGE (OUTPATIENT)
Dept: INFECTIOUS DISEASES | Facility: CLINIC | Age: 22
End: 2017-07-17

## 2017-07-20 LAB
H CAPSUL AB SER QL ID: NORMAL
INTERPRETATION: NEGATIVE
RESULT: NORMAL NG/ML
SPECIMEN SOURCE: NORMAL

## 2017-07-22 ENCOUNTER — PATIENT MESSAGE (OUTPATIENT)
Dept: PLASTIC SURGERY | Facility: CLINIC | Age: 22
End: 2017-07-22

## 2017-08-02 ENCOUNTER — TELEPHONE (OUTPATIENT)
Dept: INFECTIOUS DISEASES | Facility: CLINIC | Age: 22
End: 2017-08-02

## 2017-08-02 DIAGNOSIS — R59.0 MEDIASTINAL ADENOPATHY: Primary | ICD-10-CM

## 2017-08-04 ENCOUNTER — OFFICE VISIT (OUTPATIENT)
Dept: PLASTIC SURGERY | Facility: CLINIC | Age: 22
End: 2017-08-04
Payer: COMMERCIAL

## 2017-08-04 ENCOUNTER — HOSPITAL ENCOUNTER (OUTPATIENT)
Dept: RADIOLOGY | Facility: HOSPITAL | Age: 22
Discharge: HOME | End: 2017-08-04
Attending: INTERNAL MEDICINE
Payer: COMMERCIAL

## 2017-08-04 VITALS — BODY MASS INDEX: 21.97 KG/M2 | WEIGHT: 124 LBS

## 2017-08-04 DIAGNOSIS — D23.39 DERMOID CYST OF FOREHEAD: Primary | ICD-10-CM

## 2017-08-04 DIAGNOSIS — R59.0 MEDIASTINAL ADENOPATHY: ICD-10-CM

## 2017-08-04 PROCEDURE — 99999 PR PBB SHADOW E&M-EST. PATIENT-LVL I: CPT | Mod: PBBFAC,,, | Performed by: SURGERY

## 2017-08-04 PROCEDURE — 11441 EXC FACE-MM B9+MARG 0.6-1 CM: CPT | Mod: 51,S$GLB,, | Performed by: SURGERY

## 2017-08-04 PROCEDURE — 71250 CT THORAX DX C-: CPT | Mod: 26,,, | Performed by: RADIOLOGY

## 2017-08-04 PROCEDURE — 88304 TISSUE EXAM BY PATHOLOGIST: CPT | Mod: 26,,,

## 2017-08-04 PROCEDURE — 88304 TISSUE EXAM BY PATHOLOGIST: CPT

## 2017-08-04 PROCEDURE — 99211 OFF/OP EST MAY X REQ PHY/QHP: CPT | Mod: 25,S$GLB,, | Performed by: SURGERY

## 2017-08-04 PROCEDURE — 12051 INTMD RPR FACE/MM 2.5 CM/<: CPT | Mod: S$GLB,,, | Performed by: SURGERY

## 2017-08-04 PROCEDURE — 71250 CT THORAX DX C-: CPT | Mod: TC

## 2017-08-04 NOTE — PROGRESS NOTES
[facility].  She had a cyst excised   several months ago.  It has recurred.  Pathology of the old cyst definitely   indeed showed that the cyst was removed.  Thus, she must have a new cyst in the   area.  It does appear slightly superior to the last one.  The patient was   brought to the minor surgery room, prepped and draped in a normal sterile   fashion.  The cyst was removed.  It was closed using 4-0 Monocryl followed by   nylon sutures.  There were no complications with this procedure.      IRMA/CLINTON  dd: 08/04/2017 12:37:41 (CDT)  td: 08/04/2017 17:30:11 (CDT)  Doc ID   #[id number]  Job ID #[id number]    CC:

## 2017-08-22 ENCOUNTER — TELEPHONE (OUTPATIENT)
Dept: INFECTIOUS DISEASES | Facility: CLINIC | Age: 22
End: 2017-08-22

## 2017-08-22 NOTE — TELEPHONE ENCOUNTER
Spoke to Arleen in lab who transferred me to Mabel in send out lab. Per Mabel, she spoke to Dr. Rand through e-mail regarding this lab. Tissue has to be done and pathology is the one that handles this test.

## 2017-09-27 ENCOUNTER — PATIENT MESSAGE (OUTPATIENT)
Dept: INTERNAL MEDICINE | Facility: CLINIC | Age: 22
End: 2017-09-27

## 2017-10-24 ENCOUNTER — PATIENT MESSAGE (OUTPATIENT)
Dept: INFECTIOUS DISEASES | Facility: CLINIC | Age: 22
End: 2017-10-24

## 2017-11-15 ENCOUNTER — PATIENT MESSAGE (OUTPATIENT)
Dept: INTERNAL MEDICINE | Facility: CLINIC | Age: 22
End: 2017-11-15

## 2017-12-04 ENCOUNTER — TELEPHONE (OUTPATIENT)
Dept: OBSTETRICS AND GYNECOLOGY | Facility: CLINIC | Age: 22
End: 2017-12-04

## 2017-12-19 ENCOUNTER — TELEPHONE (OUTPATIENT)
Dept: INFECTIOUS DISEASES | Facility: CLINIC | Age: 22
End: 2017-12-19

## 2017-12-19 DIAGNOSIS — R59.0 MEDIASTINAL ADENOPATHY: Primary | ICD-10-CM

## 2017-12-20 ENCOUNTER — PATIENT MESSAGE (OUTPATIENT)
Dept: INFECTIOUS DISEASES | Facility: CLINIC | Age: 22
End: 2017-12-20

## 2017-12-29 ENCOUNTER — HOSPITAL ENCOUNTER (OUTPATIENT)
Dept: RADIOLOGY | Facility: HOSPITAL | Age: 22
Discharge: HOME | End: 2017-12-29
Attending: INTERNAL MEDICINE
Payer: COMMERCIAL

## 2017-12-29 DIAGNOSIS — R59.0 MEDIASTINAL ADENOPATHY: ICD-10-CM

## 2017-12-29 PROCEDURE — 71250 CT THORAX DX C-: CPT | Mod: 26,,, | Performed by: RADIOLOGY

## 2017-12-29 PROCEDURE — 71250 CT THORAX DX C-: CPT | Mod: TC

## 2018-01-03 ENCOUNTER — OFFICE VISIT (OUTPATIENT)
Dept: OBSTETRICS AND GYNECOLOGY | Facility: CLINIC | Age: 23
End: 2018-01-03
Attending: OBSTETRICS & GYNECOLOGY
Payer: COMMERCIAL

## 2018-01-03 VITALS
DIASTOLIC BLOOD PRESSURE: 76 MMHG | HEIGHT: 63 IN | WEIGHT: 131.06 LBS | SYSTOLIC BLOOD PRESSURE: 100 MMHG | BODY MASS INDEX: 23.22 KG/M2

## 2018-01-03 DIAGNOSIS — Z11.3 SCREENING EXAMINATION FOR STD (SEXUALLY TRANSMITTED DISEASE): ICD-10-CM

## 2018-01-03 DIAGNOSIS — Z12.4 ROUTINE CERVICAL SMEAR: Primary | ICD-10-CM

## 2018-01-03 PROCEDURE — 87491 CHLMYD TRACH DNA AMP PROBE: CPT

## 2018-01-03 PROCEDURE — 99395 PREV VISIT EST AGE 18-39: CPT | Mod: S$GLB,,, | Performed by: OBSTETRICS & GYNECOLOGY

## 2018-01-03 PROCEDURE — 88175 CYTOPATH C/V AUTO FLUID REDO: CPT | Performed by: PATHOLOGY

## 2018-01-03 PROCEDURE — 88141 CYTOPATH C/V INTERPRET: CPT | Mod: ,,, | Performed by: PATHOLOGY

## 2018-01-03 NOTE — PROGRESS NOTES
SUBJECTIVE:   22 y.o. female No obstetric history on file.  for annual routine Pap and checkup. Patient's last menstrual period was 12/24/2017..  She has no unusual complaints and desires STD testing. .        Past Medical History:   Diagnosis Date    B12 deficiency     Iron deficiency     Mastocytosis     Obsessive compulsive disorder     followed by Dr. Malone     Past Surgical History:   Procedure Laterality Date    BRONCHOSCOPY      LUNG BIOPSY  03/2017     Social History     Social History    Marital status: Single     Spouse name: N/A    Number of children: N/A    Years of education: N/A     Occupational History    Student      [employer]     Social History Main Topics    Smoking status: Never Smoker    Smokeless tobacco: Never Used    Alcohol use Yes      Comment: occasionally    Drug use: No    Sexual activity: Yes     Partners: Male     Birth control/ protection: OCP     Other Topics Concern    Are You Pregnant Or Think You May Be? No    Breast-Feeding No     Social History Narrative    Lives with parents.  Student at [employer].     Family History   Problem Relation Age of Onset    Hyperlipidemia Mother     Melanoma Mother     Allergies Mother     Cancer Mother     Other Father      ulcerative colitis    Allergies Brother     Depression Brother     Eczema Brother     Obesity Brother     Mental illness Sister      OCD, PANDAS    Psoriasis Neg Hx     Lupus Neg Hx     Anesthesia problems Neg Hx      OB History   No data available         Current Outpatient Prescriptions   Medication Sig Dispense Refill    ACZONE 5 % topical gel Apply topically every morning. 60 g 3    tazarotene (TAZORAC) 0.05 % Crea cream Apply topically every evening. Apply thin film to face qhs after moisturizing. 60 g 1    cyanocobalamin (VITAMIN B-12) 1000 MCG tablet Take 1 tablet (1,000 mcg total) by mouth once daily. 90 tablet 3    ferrous sulfate 325 mg (65 mg iron) Tab tablet Take 1 tablet  (325 mg total) by mouth daily with breakfast. 90 tablet 0    fluoxetine 10 MG Tab Take 1 tablet (10 mg total) by mouth once daily. (Patient taking differently: Take 10 mg by mouth once daily. ) 30 tablet 3    itraconazole (SPORANOX) 100 mg Cap Take 2 capsules (200 mg total) by mouth 2 (two) times daily. 60 capsule 6    norethindrone-ethinyl estradiol (OVCON) 0.4-35 mg-mcg per tablet Take 1 tablet by mouth once daily. 30 tablet 11    predniSONE (DELTASONE) 20 MG tablet Take 40mg (two tablets) daily for two weeks then 20 mg daily until seen in clinic. (Patient taking differently: Take 30 mg by mouth once daily. Take 40mg (two tablets) daily for two weeks then 20 mg daily until seen in clinic.) 50 tablet 1    sulfacetamide sodium-sulfur (SULFACLEANSE 8-4) 8-4 % Susp Wash face qhs 473 mL 3    sulfamethoxazole-trimethoprim 800-160mg (BACTRIM DS) 800-160 mg Tab Take 1 tablet by mouth every Mon, Wed, Fri. 30 tablet 6     No current facility-administered medications for this visit.      Allergies: No known drug allergies     ROS:  Constitutional: no weight loss, weight gain, fever, fatigue  Eyes:  No vision changes, glasses/contacts  ENT/Mouth: No ulcers, sinus problems, ears ringing, headache  Cardiovascular: No inability to lie flat, chest pain, exercise intolerance, swelling, heart palpitations  Respiratory: No wheezing, coughing blood, shortness of breath, or cough  Gastrointestinal: No diarrhea, bloody stool, nausea/vomiting, constipation, gas, hemorrhoids  Genitourinary: No blood in urine, painful urination, urgency of urination, frequency of urination, incomplete emptying, incontinence, abnormal bleeding, painful periods, heavy periods, vaginal discharge, vaginal odor, painful intercourse, sexual problems, bleeding after intercourse.  Musculoskeletal: No muscle weakness  Skin/Breast: No painful breasts, nipple discharge, masses, rash, ulcers  Neurological: No passing out, seizures, numbness,  headache  Endocrine: No diabetes, hypothyroid, hyperthyroid, hot flashes, hair loss, abnormal hair growth, acne  Psychiatric: No depression, crying  Hematologic: No bruises, bleeding, swollen lymph nodes, anemia.      Physical Exam:   Constitutional: She is oriented to person, place, and time. She appears well-developed and well-nourished.      Neck: Normal range of motion. No tracheal deviation present. No thyromegaly present.    Cardiovascular: Exam reveals no edema.     Pulmonary/Chest: Effort normal. She exhibits no mass, no tenderness, no deformity and no retraction. Right breast exhibits no inverted nipple, no mass, no nipple discharge, no skin change, no tenderness, presence, no bleeding and no swelling. Left breast exhibits no inverted nipple, no mass, no nipple discharge, no skin change, no tenderness, presence, no bleeding and no swelling. Breasts are symmetrical.        Abdominal: Soft. She exhibits no distension and no mass. There is no tenderness. There is no rebound and no guarding. No hernia. Hernia confirmed negative in the left inguinal area.     Genitourinary: Vagina normal and uterus normal. Rectal exam shows no external hemorrhoid. There is no rash, tenderness or lesion on the right labia. There is no rash, tenderness or lesion on the left labia. Uterus is not deviated. Cervix is normal. No no adexnal prolapse. Right adnexum displays no mass, no tenderness and no fullness. Left adnexum displays no mass, no tenderness and no fullness. No tenderness, bleeding, rectocele, cystocele or unspecified prolapse of vaginal walls in the vagina. No vaginal discharge found. Cervix exhibits no motion tenderness, no discharge and no friability.           Musculoskeletal: Normal range of motion and moves all extremeties. She exhibits no edema.      Lymphadenopathy:        Right: No inguinal adenopathy present.        Left: No inguinal adenopathy present.    Neurological: She is alert and oriented to person,  place, and time.    Skin: No rash noted. No erythema. No pallor.    Psychiatric: She has a normal mood and affect. Her behavior is normal. Judgment and thought content normal.         ASSESSMENT:   well woman  no contraindication to continue use of oral contraceptives    PLAN:   pap smear  return annually or prn  Patient to return to clinic tomorrow for genetic testing.

## 2018-01-04 ENCOUNTER — OFFICE VISIT (OUTPATIENT)
Dept: OBSTETRICS AND GYNECOLOGY | Facility: CLINIC | Age: 23
End: 2018-01-04
Attending: OBSTETRICS & GYNECOLOGY
Payer: COMMERCIAL

## 2018-01-04 VITALS — DIASTOLIC BLOOD PRESSURE: 68 MMHG | WEIGHT: 130.94 LBS | SYSTOLIC BLOOD PRESSURE: 116 MMHG | BODY MASS INDEX: 23.2 KG/M2

## 2018-01-04 DIAGNOSIS — Z80.41 FAMILY HISTORY OF OVARIAN CANCER: ICD-10-CM

## 2018-01-04 DIAGNOSIS — Z80.3 FAMILY HISTORY OF BREAST CANCER: Primary | ICD-10-CM

## 2018-01-04 DIAGNOSIS — Z84.81 FH: BRCA GENE POSITIVE: ICD-10-CM

## 2018-01-04 DIAGNOSIS — Z71.83 ENCOUNTER FOR NONPROCREATIVE GENETIC COUNSELING: ICD-10-CM

## 2018-01-04 DIAGNOSIS — Z13.79 GENETIC TESTING: ICD-10-CM

## 2018-01-04 LAB
C TRACH DNA SPEC QL NAA+PROBE: NOT DETECTED
N GONORRHOEA DNA SPEC QL NAA+PROBE: NOT DETECTED

## 2018-01-04 PROCEDURE — 99214 OFFICE O/P EST MOD 30 MIN: CPT | Mod: S$GLB,,, | Performed by: OBSTETRICS & GYNECOLOGY

## 2018-01-07 NOTE — PROGRESS NOTES
"  Chief Complaint:  Genetic testing      History of Present Illness  Donna Kumar is a 22 y.o. No obstetric history on file. Female seen today in clinic for genetic counseling and genetic testinig.    She has a family history of breast and ovarian cancer. Pedigree scanned into chart.     Her mother is BRCA 1+       Height:  5'3"  Weight: 130 pounds  Menarche age: 17  First live birth age:N/A  Oral contraceptive use: YES  Menopause age:N/A  HRT use: N/A  Date of Last Mammogram: N/A      Past Medical History:   Diagnosis Date    B12 deficiency     Iron deficiency     Mastocytosis     Obsessive compulsive disorder     followed by Dr. Malone     Past Surgical History:   Procedure Laterality Date    BRONCHOSCOPY      LUNG BIOPSY  03/2017     Social History     Social History    Marital status: Single     Spouse name: N/A    Number of children: N/A    Years of education: N/A     Occupational History    Student      [employer]     Social History Main Topics    Smoking status: Never Smoker    Smokeless tobacco: Never Used    Alcohol use Yes      Comment: occasionally    Drug use: No    Sexual activity: Yes     Partners: Male     Birth control/ protection: OCP     Other Topics Concern    Are You Pregnant Or Think You May Be? No    Breast-Feeding No     Social History Narrative    Lives with parents.  Student at [employer].     Family History   Problem Relation Age of Onset    Hyperlipidemia Mother     Melanoma Mother     Allergies Mother     Cancer Mother     Other Father      ulcerative colitis    Allergies Brother     Depression Brother     Eczema Brother     Obesity Brother     Mental illness Sister      OCD, PANDAS    Psoriasis Neg Hx     Lupus Neg Hx     Anesthesia problems Neg Hx      OB History   No data available         Current Outpatient Prescriptions   Medication Sig Dispense Refill    ACZONE 5 % topical gel Apply topically every morning. 60 g 3    cyanocobalamin " (VITAMIN B-12) 1000 MCG tablet Take 1 tablet (1,000 mcg total) by mouth once daily. 90 tablet 3    ferrous sulfate 325 mg (65 mg iron) Tab tablet Take 1 tablet (325 mg total) by mouth daily with breakfast. 90 tablet 0    fluoxetine 10 MG Tab Take 1 tablet (10 mg total) by mouth once daily. (Patient taking differently: Take 10 mg by mouth once daily. ) 30 tablet 3    itraconazole (SPORANOX) 100 mg Cap Take 2 capsules (200 mg total) by mouth 2 (two) times daily. 60 capsule 6    norethindrone-ethinyl estradiol (OVCON) 0.4-35 mg-mcg per tablet Take 1 tablet by mouth once daily. 30 tablet 11    predniSONE (DELTASONE) 20 MG tablet Take 40mg (two tablets) daily for two weeks then 20 mg daily until seen in clinic. (Patient taking differently: Take 30 mg by mouth once daily. Take 40mg (two tablets) daily for two weeks then 20 mg daily until seen in clinic.) 50 tablet 1    sulfacetamide sodium-sulfur (SULFACLEANSE 8-4) 8-4 % Susp Wash face qhs 473 mL 3    sulfamethoxazole-trimethoprim 800-160mg (BACTRIM DS) 800-160 mg Tab Take 1 tablet by mouth every Mon, Wed, Fri. 30 tablet 6    tazarotene (TAZORAC) 0.05 % Crea cream Apply topically every evening. Apply thin film to face qhs after moisturizing. 60 g 1     No current facility-administered medications for this visit.      Allergies: No known drug allergies         Review of Systems  Review of Systems     ROS:  Constitutional: no weight loss, weight gain, fever, fatigue  Gastrointestinal: No diarrhea, bloody stool, nausea/vomiting, constipation, gas, hemorrhoids  Genitourinary: No blood in urine, painful urination, urgency of urination, frequency of urination, incomplete emptying, incontinence, abnormal bleeding, painful periods, heavy periods, vaginal discharge, vaginal odor, painful intercourse, sexual problems, bleeding after intercourse.  Musculoskeletal: No muscle weakness  Skin/Breast: No painful breasts, nipple discharge, masses, rash, ulcers  Neurological: No  passing out, seizures, numbness, headache  Endocrine: No diabetes, hypothyroid, hyperthyroid, hot flashes, hair loss, abnormal hair growth, acne  Psychiatric: No depression, crying  Hematologic: No bruises, bleeding, swollen lymph nodes, anemia.       Objective:    Physical Exam     deferred  Assessment & Plan:       This patients family history of cancer is suggestive of a hereditary cancer syndrome. Her mother is BRCA 1 +. She meets the criteria for genetic testing established by medical society guidelines and I recommended she pursue testing based on   these criteria.     Face to face time spent with patient 25 minutes  More than 90% of time spent in counseling and coordinating follow up for patient.    Today we discussed how genetics may affect cancer susceptibility and the options, alternatives, benefits, limitations and   potential outcomes of genetic testing for hereditary cancer syndromes. The genetic test recommended for this patient today is the [facility] ®     Hereditary Cancer Panel. [facility] analyzes 28 genes including BRCA1, BRCA2,   MLH1, MSH2, MSH6, and PMS2 to identify germline genetic mutations that cause an increased risk for eight primary   cancers including: breast, ovarian, endometrial, colorectal, gastric, pancreatic, prostate, and melanomas. Due to significant clinical overlap in hereditary cancer susceptibility genes, a molecular diagnosis of a hereditary cancer condition is necessary to clarify the cancer risks this patient carries and the screening, management, and treatment options most   appropriate for her.   In our pre-test counseling, today, we discussed the possible results of genetic testing: a positive result would mean that a mutation known to be associated with a higher risk of cancer was identified and a negative result would mean that no mutation known to increase the risk of cancer was identified. We also discussed that a variant of uncertain significance   (VUS) may  be reported. A VUS is a genetic change identified in a gene, but it is unclear if this change causes an increase   in cancer risk normally associated with mutations in the gene. Due to the clinical uncertainty of this type of change, VUSs   are not used to make medical management decisions for a patient. Finally, I advised the patient that her medical management may change based on these results and may include increased surveillance, use of chemoprevention,   prophylactic surgery, or modifications to family planning and lifestyle. Counseled patient that she should continue OCPS. If she is found to be BRCA 1+ then OCPS would serve as chemoprophylaxis at this point.   ® Hereditary Cancer Panel test today and she was given information on this test to keep for her records. The patient provided informed consent. She understands the potential results of this testing and their implications for medical management and relatives. A saliva sample was collected, and sent to [facility].      A total of 25 minutes were spent discussing genetic testing and hereditary cancer syndromes with the patient today.        Plan:      Follow up for results as scheduled

## 2018-01-17 ENCOUNTER — TELEPHONE (OUTPATIENT)
Dept: OBSTETRICS AND GYNECOLOGY | Facility: CLINIC | Age: 23
End: 2018-01-17

## 2018-01-17 NOTE — TELEPHONE ENCOUNTER
Notified patient through patient portal- genetic testing is negative. Mother is BRCA 1 positive.   BRCA 1 mutation [id number] ([id number]) negative  Results will be scanned in to her chart and mailed to patient  Tried to call her- voicemail not available

## 2018-01-17 NOTE — TELEPHONE ENCOUNTER
----- Message from Ryan Lopez sent at 1/17/2018  2:19 PM CST -----  Contact: patient  x_ 1st Request   _ 2nd Request   _ 3rd Request     Who: YOVANA BYNUM [[id number]]    Why: Pt missed your call is requesting a call back    What Number to Call Back: [phone]    When to Expect a call back: (Before the end of the day)   -- if call after 3:00 call back will be tomorrow.

## 2018-02-15 ENCOUNTER — DOCUMENTATION ONLY (OUTPATIENT)
Dept: OBSTETRICS AND GYNECOLOGY | Facility: CLINIC | Age: 23
End: 2018-02-15

## 2018-02-15 NOTE — NURSING
Official copy of [facility] genetic testing results sent via mail to Donna Kumar and a second copy sent for scanning into the EMR.

## 2018-02-19 NOTE — TELEPHONE ENCOUNTER
----- Message from Liliane Baez sent at 2/19/2018  9:37 AM CST -----  Contact: pt  x_  1st Request  _  2nd Request  _  3rd Request    Please refill the medication(s) listed below.     Medication #1norethindrone-ethinyl estradiol (OVCON) 0.4-35 mg-mcg per tablet    Medication #2      Preferred Pharmacy:[id number] - [place] - [address]

## 2018-03-13 ENCOUNTER — PATIENT MESSAGE (OUTPATIENT)
Dept: PULMONOLOGY | Facility: CLINIC | Age: 23
End: 2018-03-13

## 2018-03-17 ENCOUNTER — HOSPITAL ENCOUNTER (EMERGENCY)
Facility: HOSPITAL | Age: 23
Discharge: HOME | End: 2018-03-17
Attending: EMERGENCY MEDICINE
Payer: COMMERCIAL

## 2018-03-17 VITALS
TEMPERATURE: 98 F | BODY MASS INDEX: 21.62 KG/M2 | HEART RATE: 70 BPM | DIASTOLIC BLOOD PRESSURE: 79 MMHG | RESPIRATION RATE: 16 BRPM | HEIGHT: 63 IN | OXYGEN SATURATION: 100 % | WEIGHT: 122 LBS | SYSTOLIC BLOOD PRESSURE: 120 MMHG

## 2018-03-17 DIAGNOSIS — R13.10 DYSPHAGIA, UNSPECIFIED TYPE: ICD-10-CM

## 2018-03-17 DIAGNOSIS — R13.10 DYSPHAGIA, UNSPECIFIED TYPE: Primary | ICD-10-CM

## 2018-03-17 LAB
ALBUMIN SERPL BCP-MCNC: 3.8 G/DL
ALP SERPL-CCNC: 72 U/L
ALT SERPL W/O P-5'-P-CCNC: 12 U/L
ANION GAP SERPL CALC-SCNC: 8 MMOL/L
AST SERPL-CCNC: 19 U/L
B-HCG UR QL: NEGATIVE
BASOPHILS # BLD AUTO: 0.07 K/UL
BASOPHILS NFR BLD: 0.9 %
BILIRUB SERPL-MCNC: 0.2 MG/DL
BUN SERPL-MCNC: 8 MG/DL (ref 6–30)
BUN SERPL-MCNC: 9 MG/DL
CALCIUM SERPL-MCNC: 10 MG/DL
CHLORIDE SERPL-SCNC: 102 MMOL/L (ref 95–110)
CHLORIDE SERPL-SCNC: 104 MMOL/L
CO2 SERPL-SCNC: 28 MMOL/L
CREAT SERPL-MCNC: 0.7 MG/DL (ref 0.5–1.4)
CREAT SERPL-MCNC: 0.8 MG/DL
CRP SERPL-MCNC: 31.7 MG/L
CTP QC/QA: YES
DIFFERENTIAL METHOD: NORMAL
EOSINOPHIL # BLD AUTO: 0.1 K/UL
EOSINOPHIL NFR BLD: 1.5 %
ERYTHROCYTE [DISTWIDTH] IN BLOOD BY AUTOMATED COUNT: 12.2 %
ERYTHROCYTE [SEDIMENTATION RATE] IN BLOOD BY WESTERGREN METHOD: 23 MM/HR
EST. GFR  (AFRICAN AMERICAN): >60 ML/MIN/1.73 M^2
EST. GFR  (NON AFRICAN AMERICAN): >60 ML/MIN/1.73 M^2
GLUCOSE SERPL-MCNC: 89 MG/DL
GLUCOSE SERPL-MCNC: 90 MG/DL (ref 70–110)
HCT VFR BLD AUTO: 39.5 %
HCT VFR BLD CALC: 39 %PCV (ref 36–54)
HGB BLD-MCNC: 12.8 G/DL
IMM GRANULOCYTES # BLD AUTO: 0.02 K/UL
IMM GRANULOCYTES NFR BLD AUTO: 0.3 %
LYMPHOCYTES # BLD AUTO: 1.5 K/UL
LYMPHOCYTES NFR BLD: 18.3 %
MAGNESIUM SERPL-MCNC: 1.9 MG/DL
MCH RBC QN AUTO: 28.8 PG
MCHC RBC AUTO-ENTMCNC: 32.4 G/DL
MCV RBC AUTO: 89 FL
MONOCYTES # BLD AUTO: 0.6 K/UL
MONOCYTES NFR BLD: 7 %
NEUTROPHILS # BLD AUTO: 5.8 K/UL
NEUTROPHILS NFR BLD: 72 %
NRBC BLD-RTO: 0 /100 WBC
PHOSPHATE SERPL-MCNC: 3.1 MG/DL
PLATELET # BLD AUTO: 306 K/UL
PMV BLD AUTO: 10.3 FL
POC IONIZED CALCIUM: 1.21 MMOL/L (ref 1.06–1.42)
POC TCO2 (MEASURED): 29 MMOL/L (ref 23–29)
POTASSIUM BLD-SCNC: 4 MMOL/L (ref 3.5–5.1)
POTASSIUM SERPL-SCNC: 4.2 MMOL/L
PROT SERPL-MCNC: 7.6 G/DL
RBC # BLD AUTO: 4.45 M/UL
SAMPLE: NORMAL
SODIUM BLD-SCNC: 140 MMOL/L (ref 136–145)
SODIUM SERPL-SCNC: 140 MMOL/L
WBC # BLD AUTO: 7.99 K/UL

## 2018-03-17 PROCEDURE — 99284 EMERGENCY DEPT VISIT MOD MDM: CPT | Mod: ,,, | Performed by: EMERGENCY MEDICINE

## 2018-03-17 PROCEDURE — 25500020 PHARM REV CODE 255: Performed by: EMERGENCY MEDICINE

## 2018-03-17 PROCEDURE — 99284 EMERGENCY DEPT VISIT MOD MDM: CPT | Mod: 25

## 2018-03-17 PROCEDURE — 85025 COMPLETE CBC W/AUTO DIFF WBC: CPT

## 2018-03-17 PROCEDURE — 80053 COMPREHEN METABOLIC PANEL: CPT

## 2018-03-17 PROCEDURE — 84100 ASSAY OF PHOSPHORUS: CPT

## 2018-03-17 PROCEDURE — 85651 RBC SED RATE NONAUTOMATED: CPT

## 2018-03-17 PROCEDURE — 86140 C-REACTIVE PROTEIN: CPT

## 2018-03-17 PROCEDURE — 83735 ASSAY OF MAGNESIUM: CPT

## 2018-03-17 PROCEDURE — 81025 URINE PREGNANCY TEST: CPT | Performed by: EMERGENCY MEDICINE

## 2018-03-17 RX ADMIN — IOHEXOL 75 ML: 350 INJECTION, SOLUTION INTRAVENOUS at 04:03

## 2018-03-17 NOTE — ED PROVIDER NOTES
Encounter Date: 3/17/2018       History     Chief Complaint   Patient presents with    Dysphagia     last time had swollen lymphs in chest with compression of esoph     22-year-old female with medical history of mediastinal adenopathy presents to the ED with dysphagia.  States symptoms began 2 weeks ago.  She has been unable to swallow solids for 4 days.  She presented with similar symptoms one year ago and was diagnosed with mediastinal adenopathy and was treated with steroids. Does c/o nasal congestion and sore throat x 2 days. She denies fever, chills, chest pain, shortness of breath, nausea, vomiting, headache, food bolus.          Review of patient's allergies indicates:   Allergen Reactions    No known drug allergies      Past Medical History:   Diagnosis Date    B12 deficiency     Iron deficiency     Mastocytosis     Mediastinal adenopathy \    Obsessive compulsive disorder     followed by Dr. Malone     Past Surgical History:   Procedure Laterality Date    BRONCHOSCOPY      LUNG BIOPSY  03/2017     Family History   Problem Relation Age of Onset    Hyperlipidemia Mother     Melanoma Mother     Allergies Mother     Cancer Mother     Other Father      ulcerative colitis    Allergies Brother     Depression Brother     Eczema Brother     Obesity Brother     Mental illness Sister      OCD, PANDAS    Psoriasis Neg Hx     Lupus Neg Hx     Anesthesia problems Neg Hx      Social History   Substance Use Topics    Smoking status: Never Smoker    Smokeless tobacco: Never Used    Alcohol use Yes      Comment: occas, last night     Review of Systems   Constitutional: Negative for chills, diaphoresis, fatigue and fever.   HENT: Positive for congestion, sore throat and trouble swallowing. Negative for postnasal drip, sinus pain and sinus pressure.    Eyes: Negative for visual disturbance.   Respiratory: Negative for cough and shortness of breath.    Cardiovascular: Negative for chest pain.    Gastrointestinal: Negative for abdominal pain, nausea and vomiting.   Genitourinary: Negative for dysuria and flank pain.   Musculoskeletal: Negative for back pain and myalgias.   Skin: Negative for rash.   Neurological: Negative for weakness, light-headedness and headaches.   Hematological: Does not bruise/bleed easily.   Psychiatric/Behavioral: The patient is not nervous/anxious.        Physical Exam     Initial Vitals [03/17/18 1124]   BP Pulse Resp Temp SpO2   118/68 92 18 98.1 °F (36.7 °C) 100 %      MAP       84.67         Physical Exam    Vitals reviewed.  Constitutional: Vital signs are normal. She appears well-developed and well-nourished. She is not diaphoretic. No distress.   HENT:   Head: Normocephalic and atraumatic.   Nose: Nose normal. No nasal septal hematoma. No epistaxis. Right sinus exhibits no maxillary sinus tenderness and no frontal sinus tenderness. Left sinus exhibits no maxillary sinus tenderness and no frontal sinus tenderness.   Mouth/Throat: Uvula is midline and oropharynx is clear and moist. Mucous membranes are not dry. No oral lesions. No trismus in the jaw. No uvula swelling or dental caries. No posterior oropharyngeal edema, posterior oropharyngeal erythema or tonsillar abscesses.   Eyes: Conjunctivae, EOM and lids are normal. Pupils are equal, round, and reactive to light. Lids are everted and swept, no foreign bodies found.   Neck: Trachea normal and normal range of motion. Neck supple. No thyromegaly present.   Cardiovascular: Normal rate, regular rhythm, intact distal pulses and normal pulses.   Pulmonary/Chest: Breath sounds normal. She has no wheezes. She has no rhonchi. She has no rales. She exhibits no tenderness.   Abdominal: Soft. Normal appearance and bowel sounds are normal. There is no tenderness. There is no rebound and no guarding.   Musculoskeletal: She exhibits no edema.   Lymphadenopathy:     She has no cervical adenopathy.   Neurological: She is alert and  oriented to person, place, and time. She has normal strength. No sensory deficit.   Skin: Skin is warm. Capillary refill takes less than 2 seconds. No rash noted. No cyanosis.   Psychiatric: She has a normal mood and affect.         ED Course   Procedures  Labs Reviewed   C-REACTIVE PROTEIN - Abnormal; Notable for the following:        Result Value    CRP 31.7 (*)     All other components within normal limits    Narrative:     ADDING ON CRP PER LAUREL DRUMMOND MD 14:07  03/17/2018    SEDIMENTATION RATE, MANUAL - Abnormal; Notable for the following:     Sed Rate 23 (*)     All other components within normal limits    Narrative:     ADDING ON CRP PER LAUREL DRUMMOND MD 14:07  03/17/2018   ADDING ON ESR PER LAUREL DRUMMOND MD 15:49  03/17/2018    CBC W/ AUTO DIFFERENTIAL   COMPREHENSIVE METABOLIC PANEL   MAGNESIUM   PHOSPHORUS   C-REACTIVE PROTEIN   SEDIMENTATION RATE, MANUAL   POCT URINE PREGNANCY   ISTAT PROCEDURE        Imaging Results          CT Chest With Contrast (Final result)  Result time 03/17/18 17:35:22    Final result by Moustapha Schneider MD (03/17/18 17:35:22)                 Impression:    No findings to explain patient's dysphagia.  The previously identified paratracheal lymph node, is not visualized on today's examination, however may be obscures by beam hardening artifact from contrast within the superior vena cava.  Grossly stable right hilar lymphadenopathy.  Electronically signed by resident: Justen Clark  Date:    03/17/2018  Time:    17:10  Electronically signed by: Moustapha Schneider MD  Date:    03/17/2018  Time:    17:35             Narrative:    EXAMINATION:  CT CHEST WITH CONTRAST  CLINICAL HISTORY:  dysphagia, similar symptoms 1 year ago and dx with mediastinal adenopathy  TECHNIQUE:  Low dose axial images, sagittal and coronal reformations were obtained from the thoracic inlet to the lung bases, after administration of 75 cc Omnipaque 350 IV contrast.  COMPARISON:  CT chest without December 29,  2017  FINDINGS:  Base of Neck: No significant abnormality.  Thoracic soft tissues: Normal.  Aorta: Left-sided aortic arch.  No aneurysm and no significant atherosclerosis  Heart: Normal size. No effusion.  Pulmonary vasculature: Pulmonary arteries distribute normally.  There are four pulmonary veins.  Sivan/Mediastinum: There is no identifiable mediastinal or left hilar lymph nodes.  However, the previously identified paratracheal lymph node may be obscuring by beam hardening artifact from the contrast located within the superior vena cava.  Mildly enlarged right hilar lymph node measuring up to 10 mm in maximum short axis, unchanged.  Airways: Patent.  Lungs/Pleura: Clear lungs. No pleural effusion or thickening.  Esophagus: Normal.  Upper Abdomen: No abnormality of the partially imaged upper abdomen.  Bones: No acute fracture. No suspicious lytic or sclerotic lesions.                                 Medical Decision Making:   History:   Old Medical Records: I decided to obtain old medical records.  Old Records Summarized: records from clinic visits.  Initial Assessment:   22-year-old female with medical history of mediastinal adenopathy presents the ED with dysphagia x 2 weeks. Patient had similar symptoms 1 year when she was diagnosed with mediastinal adenopathy and treated with steroids.  Differential Diagnosis:   DDX includes but is not limited to mediastinal adenopathy, electrolyte abnormality, pneumonia, esophageal mass, gERD. Considered but do not suspect PE or ACS.  Clinical Tests:   Lab Tests: Ordered and Reviewed  Radiological Study: Ordered and Reviewed  ED Management:  Will get labs, CT chest with contrast.     Significant labs include slight increase in ESR and CRP. All other labs are benign. CT shows no paratracheal lymph nodes as seen in previous examinations. No other acute cardiopulmonary process present. Infectious disease contacted and does not wish to start medication at this time. Encourage  follow up with Dr. Rand and GI. Discharged to home in stable condition, return to ED warnings given, follow up and patient care instructions given.    I have discussed the treatment and management of this patient with my supervisory physician, and we agree on the plan of care.                 Attending Attestation:     Physician Attestation Statement for NP/PA:   I have conducted a face to face encounter with this patient in addition to the NP/PA, due to Medical Complexity    Other NP/PA Attestation Additions:      Medical Decision Making: I am in agreement with my physician assistant's assessment, treatment, and plan of care.                      Clinical Impression:   The encounter diagnosis was Dysphagia, unspecified type.    Disposition:   Disposition: Discharged  Condition: Stable                        Roberta Borges PA-C  03/17/18 4127

## 2018-03-17 NOTE — DISCHARGE INSTRUCTIONS
Please take liquids and slowly progress to solids as tolerated. Encourage follow up with GI.     Our goal in the emergency department is to always give you outstanding care and exceptional service. You may receive a survey by mail or e-mail in the next week regarding your experience in our ED. We would greatly appreciate your completing and returning the survey. Your feedback provides us with a way to recognize our staff who give very good care and it helps us learn how to improve when your experience was below our aspiration of excellence.

## 2018-03-17 NOTE — ED TRIAGE NOTES
Patient states onset pain with swallowing onset 2 weeks ago, states unable to swallow solid foods x 4 days, eating oatmeal and water. States 1 year ago enlarged lymph nodes that were compressing esophagus, states feels the same, treated for histoplasmosis. States CT  scan in December negative. Scheduled for CT scan Friday.

## 2018-03-17 NOTE — ED NOTES
Patient identifiers verified and correct for Ms Kumar  C/C: Sore throat, difficulty swallowing  APPEARANCE: awake and alert in NAD.  SKIN: warm, dry and intact. No breakdown or bruising. Throat clear, no edema or redness noted.   MUSCULOSKELETAL: Patient moving all extremities spontaneously, no obvious swelling or deformities noted. Ambulates independently.  RESPIRATORY: Denies shortness of breath.Respirations unlabored. Occas cough  CARDIAC: Denies CP, 2+ distal pulses; no peripheral edema  ABDOMEN: S/ND/NT, Denies nausea  : voids spontaneously, denies difficulty  Neurologic: AAO x 4; follows commands equal strength in all extremities; denies numbness/tingling. Denies dizziness Denies weakness  States difficulty swallowing water without pain in mid epigastric area, able to swallow salivia, able to swallow small pieces of food followed by water.

## 2018-03-19 ENCOUNTER — ANESTHESIA EVENT (OUTPATIENT)
Dept: ENDOSCOPY | Facility: HOSPITAL | Age: 23
End: 2018-03-19
Payer: COMMERCIAL

## 2018-03-19 ENCOUNTER — ANESTHESIA (OUTPATIENT)
Dept: ENDOSCOPY | Facility: HOSPITAL | Age: 23
End: 2018-03-19
Payer: COMMERCIAL

## 2018-03-19 ENCOUNTER — HOSPITAL ENCOUNTER (OUTPATIENT)
Facility: HOSPITAL | Age: 23
Discharge: HOME | End: 2018-03-19
Attending: INTERNAL MEDICINE | Admitting: INTERNAL MEDICINE
Payer: COMMERCIAL

## 2018-03-19 ENCOUNTER — TELEPHONE (OUTPATIENT)
Dept: ENDOSCOPY | Facility: HOSPITAL | Age: 23
End: 2018-03-19

## 2018-03-19 VITALS
DIASTOLIC BLOOD PRESSURE: 68 MMHG | OXYGEN SATURATION: 100 % | BODY MASS INDEX: 21.62 KG/M2 | HEIGHT: 63 IN | HEART RATE: 88 BPM | WEIGHT: 122 LBS | RESPIRATION RATE: 12 BRPM | TEMPERATURE: 99 F | SYSTOLIC BLOOD PRESSURE: 107 MMHG

## 2018-03-19 DIAGNOSIS — R13.10 DYSPHAGIA: ICD-10-CM

## 2018-03-19 DIAGNOSIS — R13.10 DYSPHAGIA, UNSPECIFIED TYPE: Primary | ICD-10-CM

## 2018-03-19 LAB
B-HCG UR QL: NEGATIVE
CTP QC/QA: YES

## 2018-03-19 PROCEDURE — 43249 ESOPH EGD DILATION <30 MM: CPT | Performed by: INTERNAL MEDICINE

## 2018-03-19 PROCEDURE — 43239 EGD BIOPSY SINGLE/MULTIPLE: CPT | Performed by: INTERNAL MEDICINE

## 2018-03-19 PROCEDURE — C1726 CATH, BAL DIL, NON-VASCULAR: HCPCS | Performed by: INTERNAL MEDICINE

## 2018-03-19 PROCEDURE — D9220A PRA ANESTHESIA: Mod: ANES,,, | Performed by: ANESTHESIOLOGY

## 2018-03-19 PROCEDURE — 88305 TISSUE EXAM BY PATHOLOGIST: CPT | Performed by: PATHOLOGY

## 2018-03-19 PROCEDURE — 37000009 HC ANESTHESIA EA ADD 15 MINS: Performed by: INTERNAL MEDICINE

## 2018-03-19 PROCEDURE — 88305 TISSUE EXAM BY PATHOLOGIST: CPT | Mod: 26,,, | Performed by: PATHOLOGY

## 2018-03-19 PROCEDURE — D9220A PRA ANESTHESIA: Mod: CRNA,,, | Performed by: NURSE ANESTHETIST, CERTIFIED REGISTERED

## 2018-03-19 PROCEDURE — 25000003 PHARM REV CODE 250: Performed by: INTERNAL MEDICINE

## 2018-03-19 PROCEDURE — 37000008 HC ANESTHESIA 1ST 15 MINUTES: Performed by: INTERNAL MEDICINE

## 2018-03-19 PROCEDURE — 27201012 HC FORCEPS, HOT/COLD, DISP: Performed by: INTERNAL MEDICINE

## 2018-03-19 PROCEDURE — 43249 ESOPH EGD DILATION <30 MM: CPT | Mod: ,,, | Performed by: INTERNAL MEDICINE

## 2018-03-19 PROCEDURE — 63600175 PHARM REV CODE 636 W HCPCS: Performed by: NURSE ANESTHETIST, CERTIFIED REGISTERED

## 2018-03-19 PROCEDURE — 43239 EGD BIOPSY SINGLE/MULTIPLE: CPT | Mod: 59,,, | Performed by: INTERNAL MEDICINE

## 2018-03-19 PROCEDURE — 81025 URINE PREGNANCY TEST: CPT | Performed by: INTERNAL MEDICINE

## 2018-03-19 RX ORDER — SODIUM CHLORIDE 9 MG/ML
INJECTION, SOLUTION INTRAVENOUS CONTINUOUS
Status: DISCONTINUED | OUTPATIENT
Start: 2018-03-19 | End: 2018-03-19 | Stop reason: HOSPADM

## 2018-03-19 RX ORDER — LIDOCAINE HCL/PF 100 MG/5ML
SYRINGE (ML) INTRAVENOUS
Status: DISCONTINUED | OUTPATIENT
Start: 2018-03-19 | End: 2018-03-19

## 2018-03-19 RX ORDER — PROPOFOL 10 MG/ML
VIAL (ML) INTRAVENOUS
Status: DISCONTINUED | OUTPATIENT
Start: 2018-03-19 | End: 2018-03-19

## 2018-03-19 RX ADMIN — PROPOFOL 30 MG: 10 INJECTION, EMULSION INTRAVENOUS at 09:03

## 2018-03-19 RX ADMIN — PROPOFOL 50 MG: 10 INJECTION, EMULSION INTRAVENOUS at 09:03

## 2018-03-19 RX ADMIN — PROPOFOL 40 MG: 10 INJECTION, EMULSION INTRAVENOUS at 09:03

## 2018-03-19 RX ADMIN — PROPOFOL 100 MG: 10 INJECTION, EMULSION INTRAVENOUS at 09:03

## 2018-03-19 RX ADMIN — LIDOCAINE HYDROCHLORIDE 100 MG: 20 INJECTION, SOLUTION INTRAVENOUS at 09:03

## 2018-03-19 RX ADMIN — PROPOFOL 20 MG: 10 INJECTION, EMULSION INTRAVENOUS at 09:03

## 2018-03-19 RX ADMIN — SODIUM CHLORIDE: 9 INJECTION, SOLUTION INTRAVENOUS at 09:03

## 2018-03-19 NOTE — TRANSFER OF CARE
"Anesthesia Transfer of Care Note    Patient: Donna Kumar    Procedure(s) Performed: Procedure(s) (LRB):  ESOPHAGOGASTRODUODENOSCOPY (EGD) (N/A)    Patient location: PACU    Anesthesia Type: general    Transport from OR: Transported from OR on room air with adequate spontaneous ventilation    Post pain: adequate analgesia    Post assessment: no apparent anesthetic complications    Post vital signs: stable    Level of consciousness: awake, alert and oriented    Nausea/Vomiting: no nausea/vomiting    Complications: none    Transfer of care protocol was followed      Last vitals:   Visit Vitals  /64 (BP Location: Left arm, Patient Position: Lying)   Pulse 82   Temp 36.6 °C (97.9 °F) (Temporal)   Resp 16   Ht 5' 3" (1.6 m)   Wt 55.3 kg (122 lb)   LMP 03/10/2018   SpO2 99%   Breastfeeding? No   BMI 21.61 kg/m²     "

## 2018-03-19 NOTE — H&P
Short Stay Endoscopy History and Physical    PCP - Brooklynn Boyle MD    Procedure - EGD  Sedation: GA  ASA - per anesthesia  Mallampati - per anesthesia  History of Anesthesia problems - no  Family history Anesthesia problems -  no     HPI:  This is a 22 y.o. female here for evaluation of : Dysphagia    Reflux - no  Dysphagia - yes  Abdominal pain - no  Diarrhea - no    ROS:  Constitutional: No fevers, chills, No weight loss  ENT: No allergies  CV: No chest pain  Pulm: No cough, No shortness of breath  Ophtho: No vision changes  GI: see HPI    Medical History:  has a past medical history of B12 deficiency; Iron deficiency; Mastocytosis; Mediastinal adenopathy (\); and Obsessive compulsive disorder.    Surgical History:  has a past surgical history that includes Lung biopsy (03/2017) and Bronchoscopy.    Family History: family history includes Allergies in her brother and mother; Cancer in her mother; Depression in her brother; Eczema in her brother; Hyperlipidemia in her mother; Melanoma in her mother; Mental illness in her sister; Obesity in her brother; Other in her father.. Otherwise no colon cancer, inflammatory bowel disease, or GI malignancies.    Social History:  reports that she has never smoked. She has never used smokeless tobacco. She reports that she drinks alcohol. She reports that she does not use drugs.    Review of patient's allergies indicates:   Allergen Reactions    No known drug allergies        Medications:   Prescriptions Prior to Admission   Medication Sig Dispense Refill Last Dose    ACZONE 5 % topical gel Apply topically every morning. 60 g 3 3/18/2018 at Unknown time    fluoxetine 10 MG Tab Take 1 tablet (10 mg total) by mouth once daily. (Patient taking differently: Take 20 mg by mouth once daily. ) 30 tablet 3 3/18/2018 at Unknown time    norethindrone-ethinyl estradiol (OVCON) 0.4-35 mg-mcg per tablet Take 1 tablet by mouth once daily. 30 tablet 11 3/18/2018 at Unknown time    tazarotene  (TAZORAC) 0.05 % Crea cream Apply topically every evening. Apply thin film to face qhs after moisturizing. 60 g 1 3/18/2018 at Unknown time       Objective Findings:    Vital Signs: Per nursing notes.    Physical Exam:  General Appearance: Well appearing in no acute distress  Head:   Normocephalic, without obvious abnormality  Eyes:    No scleral icterus  Airway: Open  Neck: No restriction in mobility  Lungs: CTA bilaterally in anterior and posterior fields, no wheezes, no crackles.  Heart:  Regular rate and rhythm, S1, S2 normal, no murmurs heard  Abdomen: Soft, non tender, non distended      Labs:  Lab Results   Component Value Date    WBC 7.99 03/17/2018    HGB 12.8 03/17/2018    HCT 39 03/17/2018     03/17/2018    CHOL 193 03/09/2017    TRIG 124 03/09/2017    HDL 67 03/09/2017    ALT 12 03/17/2018    AST 19 03/17/2018     03/17/2018    K 4.2 03/17/2018     03/17/2018    CREATININE 0.8 03/17/2018    BUN 9 03/17/2018    CO2 28 03/17/2018    TSH 0.944 03/09/2017         I have explained the risks and benefits of endoscopy procedures to the patient including but not limited to bleeding, perforation, infection, and death.    Thank you so much for allowing me to participate in the care of Donna Puente MD

## 2018-03-19 NOTE — ANESTHESIA RELEASE NOTE
Anesthesia Release from PACU note    Patient: Donna Kumar    Procedure(s) Performed: Procedure(s) (LRB):  ESOPHAGOGASTRODUODENOSCOPY (EGD) (N/A)    Anesthesia type: general    Post pain: Adequate analgesia    Post assessment: no apparent anesthetic complications, tolerated procedure well and no evidence of recall    Last Vitals:   Vitals:    03/19/18 0958   BP: 107/68   Pulse: 88   Resp: 12   Temp:    SpO2: 100%       Post vital signs: stable    Level of consciousness: awake, alert  and oriented    Nausea/Vomiting: no nausea/no vomiting    Complications: none    Airway Patency: patent    Respiratory: unassisted    Cardiovascular: stable and blood pressure at baseline    Hydration: euvolemic

## 2018-03-19 NOTE — TELEPHONE ENCOUNTER
Was informed this morning the patient was seen in the ER over and weekend and was asked to show early this morning for an EGD. Reviewed medical history and medications. Patient informed that she was told to prep by no solids after midnight last night and only liquids. She denies taking any medications this morning. Placed her on the schedule with Dr. MIL Garduno. Will notify pre services when open at 8 am of this add on.

## 2018-03-19 NOTE — DISCHARGE INSTRUCTIONS

## 2018-03-19 NOTE — ANESTHESIA POSTPROCEDURE EVALUATION
"Anesthesia Post Evaluation    Patient: Donna Kumar    Procedure(s) Performed: Procedure(s) (LRB):  ESOPHAGOGASTRODUODENOSCOPY (EGD) (N/A)    Final Anesthesia Type: general  Patient location during evaluation: PACU  Patient participation: Yes- Able to Participate  Level of consciousness: awake and alert  Post-procedure vital signs: reviewed and stable  Pain management: adequate  Airway patency: patent  PONV status at discharge: No PONV  Anesthetic complications: no      Cardiovascular status: blood pressure returned to baseline  Respiratory status: unassisted  Hydration status: euvolemic  Follow-up not needed.        Visit Vitals  /68 (BP Location: Left arm, Patient Position: Lying)   Pulse 88   Temp 37.1 °C (98.7 °F) (Oral)   Resp 12   Ht 5' 3" (1.6 m)   Wt 55.3 kg (122 lb)   LMP 03/10/2018   SpO2 100%   Breastfeeding? No   BMI 21.61 kg/m²       Pain/Cecilia Score: Pain Assessment Performed: Yes (3/19/2018  9:59 AM)  Presence of Pain: denies (3/19/2018  9:59 AM)  Pain Rating Prior to Med Admin: 0 (3/19/2018  9:59 AM)  Cecilia Score: 10 (3/19/2018  9:59 AM)      "

## 2018-03-19 NOTE — ANESTHESIA PREPROCEDURE EVALUATION
03/19/2018  Donna Kumar is a 22 y.o., female   Pre-operative evaluation for Procedure(s) (LRB):  ESOPHAGOGASTRODUODENOSCOPY (EGD) (N/A)    Donna Kumar is a 22 y.o. female       Active problems:  dysphagia    Prev airway:       Review of patient's allergies indicates:   Allergen Reactions    No known drug allergies         No current facility-administered medications on file prior to encounter.      Current Outpatient Prescriptions on File Prior to Encounter   Medication Sig Dispense Refill    ACZONE 5 % topical gel Apply topically every morning. 60 g 3    fluoxetine 10 MG Tab Take 1 tablet (10 mg total) by mouth once daily. (Patient taking differently: Take 20 mg by mouth once daily. ) 30 tablet 3    norethindrone-ethinyl estradiol (OVCON) 0.4-35 mg-mcg per tablet Take 1 tablet by mouth once daily. 30 tablet 11    tazarotene (TAZORAC) 0.05 % Crea cream Apply topically every evening. Apply thin film to face qhs after moisturizing. 60 g 1       Past Surgical History:   Procedure Laterality Date    BRONCHOSCOPY      LUNG BIOPSY  03/2017       Social History     Social History    Marital status: Single     Spouse name: N/A    Number of children: N/A    Years of education: N/A     Occupational History    Student      [employer]     Social History Main Topics    Smoking status: Never Smoker    Smokeless tobacco: Never Used    Alcohol use Yes      Comment: occas, last night    Drug use: No    Sexual activity: Yes     Partners: Male     Birth control/ protection: OCP     Other Topics Concern    Are You Pregnant Or Think You May Be? No    Breast-Feeding No     Social History Narrative    Lives with parents.  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         Vital Signs Range (Last 24H):  Wt Readings from Last 3 Encounters:   03/17/18 55.3 kg (122 lb)   01/04/18 59.4 kg (130 lb 15.3 oz)   01/03/18  59.5 kg (131 lb 1 oz)     Temp Readings from Last 3 Encounters:   03/17/18 36.5 °C (97.7 °F) (Oral)   06/20/17 36.8 °C (98.2 °F) (Oral)   06/07/17 36.7 °C (98.1 °F) (Oral)     BP Readings from Last 3 Encounters:   03/17/18 120/79   01/04/18 116/68   01/03/18 100/76     Pulse Readings from Last 3 Encounters:   03/17/18 70   06/20/17 93   06/07/17 75         CBC:   Lab Results   Component Value Date    WBC 7.99 03/17/2018    HGB 12.8 03/17/2018    HCT 39 03/17/2018    MCV 89 03/17/2018     03/17/2018       CMP: CMP  Sodium   Date Value Ref Range Status   03/17/2018 140 136 - 145 mmol/L Final     Potassium   Date Value Ref Range Status   03/17/2018 4.2 3.5 - 5.1 mmol/L Final     Chloride   Date Value Ref Range Status   03/17/2018 104 95 - 110 mmol/L Final     CO2   Date Value Ref Range Status   03/17/2018 28 23 - 29 mmol/L Final     Glucose   Date Value Ref Range Status   03/17/2018 89 70 - 110 mg/dL Final     BUN, Bld   Date Value Ref Range Status   03/17/2018 9 6 - 20 mg/dL Final     Creatinine   Date Value Ref Range Status   03/17/2018 0.8 0.5 - 1.4 mg/dL Final     Calcium   Date Value Ref Range Status   03/17/2018 10.0 8.7 - 10.5 mg/dL Final     Total Protein   Date Value Ref Range Status   03/17/2018 7.6 6.0 - 8.4 g/dL Final     Albumin   Date Value Ref Range Status   03/17/2018 3.8 3.5 - 5.2 g/dL Final     Total Bilirubin   Date Value Ref Range Status   03/17/2018 0.2 0.1 - 1.0 mg/dL Final     Comment:     For infants and newborns, interpretation of results should be based  on gestational age, weight and in agreement with clinical  observations.  Premature Infant recommended reference ranges:  Up to 24 hours.............<8.0 mg/dL  Up to 48 hours............<12.0 mg/dL  3-5 days..................<15.0 mg/dL  6-29 days.................<15.0 mg/dL       Alkaline Phosphatase   Date Value Ref Range Status   03/17/2018 72 55 - 135 U/L Final     AST   Date Value Ref Range Status   03/17/2018 19 10 - 40 U/L Final      ALT   Date Value Ref Range Status   2018 12 10 - 44 U/L Final     Anion Gap   Date Value Ref Range Status   2018 8 8 - 16 mmol/L Final     eGFR if    Date Value Ref Range Status   2018 >60.0 >60 mL/min/1.73 m^2 Final     eGFR if non    Date Value Ref Range Status   2018 >60.0 >60 mL/min/1.73 m^2 Final     Comment:     Calculation used to obtain the estimated glomerular filtration  rate (eGFR) is the CKD-EPI equation.          INR  No results found for: INR, PROTIME        Diagnostic Studies:      EKD Echo:        .    Anesthesia Evaluation         Review of Systems  Anesthesia Hx:  No problems with previous Anesthesia  History of prior surgery of interest to airway management or planning: Denies Family Hx of Anesthesia complications.   Denies Personal Hx of Anesthesia complications.   Hematology/Oncology:  Hematology Normal   Oncology Normal     EENT/Dental:EENT/Dental Normal   Cardiovascular:  Cardiovascular Normal     Pulmonary:   Hx mediastional lymphadenopathy  No dx    Renal/:  Renal/ Normal     Hepatic/GI:   dysphagia   Neurological:  Neurology Normal    Endocrine:  Endocrine Normal    Psych:   Psychiatric History          Physical Exam  General:  Well nourished    Airway/Jaw/Neck:  Airway Findings: Mouth Opening: Normal Tongue: Normal  General Airway Assessment: Adult  Mallampati: I  Jaw/Neck Findings:  Neck ROM: Normal ROM      Dental:  Dental Findings: In tact   Chest/Lungs:  Chest/Lungs Findings: Clear to auscultation     Heart/Vascular:  Heart Findings: Rate: Normal  Rhythm: Regular Rhythm  Sounds: Normal        Mental Status:  Mental Status Findings:  Cooperative         Anesthesia Plan  Type of Anesthesia, risks & benefits discussed:  Anesthesia Type:  general  Patient's Preference:   Intra-op Monitoring Plan:   Intra-op Monitoring Plan Comments:   Post Op Pain Control Plan:   Post Op Pain Control Plan Comments:   Induction:    IV  Beta Blocker:  Patient is not currently on a Beta-Blocker (No further documentation required).       Informed Consent: Patient understands risks and agrees with Anesthesia plan.  Questions answered. Anesthesia consent signed with patient.  ASA Score: 2     Day of Surgery Review of History & Physical:    H&P update referred to the surgeon.         Ready For Surgery From Anesthesia Perspective.

## 2018-03-19 NOTE — PROVATION PATIENT INSTRUCTIONS
Discharge Summary/Instructions after an Endoscopic Procedure  Patient Name: Donna Kumar  Patient MRN: [id number]  Patient YOB: 1995 Monday, March 19, 2018  Will Puente MD  RESTRICTIONS:  During your procedure today, you received medications for sedation.  These   medications may affect your judgment, balance and coordination.  Therefore,   for 24 hours, you have the following restrictions:   - DO NOT drive a car, operate machinery, make legal/financial decisions,   sign important papers or drink alcohol.    ACTIVITY:  The following day: return to full activity including work, except no heavy   lifting, straining or running for 3 days if polyps were removed.  DIET:  Eat and drink normally unless instructed otherwise.     TREATMENT FOR COMMON SIDE EFFECTS:  - Mild abdominal pain, nausea, belching, bloating or excessive gas:  rest,   eat lightly and use a heating pad.  - Sore Throat: treat with throat lozenges and/or gargle with warm salt   water.  - Because air was used during the procedure, expelling large amounts of air   from your rectum or belching is normal.  - If a bowel prep was taken, you may not have a bowel movement for 1-3 days.    This is normal.  SYMPTOMS TO WATCH FOR AND REPORT TO YOUR PHYSICIAN:  1. Abdominal pain or bloating, other than gas cramps.  2. Chest pain.  3. Back pain.  4. Signs of infection such as: chills or fever occurring within 24 hours   after the procedure.  5. Rectal bleeding, which would show as bright red, maroon, or black stools.   (A tablespoon of blood from the rectum is not serious, especially if   hemorrhoids are present.)  6. Vomiting.  7. Weakness or dizziness.  GO DIRECTLY TO THE NEAREST EMERGENCY ROOM IF YOU HAVE ANY OF THE FOLLOWING:      Difficulty breathing  Chills and/or fever over 101 F   Persistent vomiting and/or vomiting blood   Severe abdominal pain   Severe chest pain   Black, tarry stools   Bleeding- more than one tablespoon   Any other  symptom or condition that you feel may need urgent attention  Your doctor recommends these additional instructions:  If any biopsies were taken, your doctors clinic will contact you in 1 to 2   weeks with any results.  You have a contact number available for emergencies.  The signs and symptoms   of potential delayed complications were discussed with you.  You may return   to normal activities tomorrow.  Written discharge instructions were   provided to you.   You are being discharged to home.   Advance your diet as tolerated.   Continue your present medications.   We are waiting for your pathology results.  For questions, problems or results please call your physician - Will Puente MD at Work:  [phone].  [facility], EMERGENCY ROOM PHONE NUMBER: [phone]  IF A COMPLICATION OR EMERGENCY SITUATION ARISES AND YOU ARE UNABLE TO REACH   YOUR PHYSICIAN - GO DIRECTLY TO THE EMERGENCY ROOM.  Will Puente MD  3/19/2018 9:21:22 AM  This report has been verified and signed electronically.

## 2018-03-21 ENCOUNTER — TELEPHONE (OUTPATIENT)
Dept: GASTROENTEROLOGY | Facility: CLINIC | Age: 23
End: 2018-03-21

## 2018-03-21 ENCOUNTER — TELEPHONE (OUTPATIENT)
Dept: ENDOSCOPY | Facility: HOSPITAL | Age: 23
End: 2018-03-21

## 2018-03-21 DIAGNOSIS — R59.0 MEDIASTINAL ADENOPATHY: Primary | ICD-10-CM

## 2018-03-21 NOTE — TELEPHONE ENCOUNTER
Spoke with patient's mother. EUS scheduled for 3/23 at 9a. Reviewed prep instructions. Ms José verbalized understanding.

## 2018-03-21 NOTE — TELEPHONE ENCOUNTER
Called about UEUS scheduled 3/23/18 at 0900.  Left voicemail on mother's phone with call back number for questions.

## 2018-03-22 ENCOUNTER — TELEPHONE (OUTPATIENT)
Dept: GASTROENTEROLOGY | Facility: CLINIC | Age: 23
End: 2018-03-22

## 2018-03-22 NOTE — TELEPHONE ENCOUNTER
----- Message from Will Puente MD sent at 3/22/2018  8:22 AM CDT -----  Biopsies do not show any significant inflammation.

## 2018-03-23 ENCOUNTER — SURGERY (OUTPATIENT)
Age: 23
End: 2018-03-23

## 2018-03-23 ENCOUNTER — ANESTHESIA (OUTPATIENT)
Dept: ENDOSCOPY | Facility: HOSPITAL | Age: 23
End: 2018-03-23
Payer: COMMERCIAL

## 2018-03-23 ENCOUNTER — ANESTHESIA EVENT (OUTPATIENT)
Dept: ENDOSCOPY | Facility: HOSPITAL | Age: 23
End: 2018-03-23
Payer: COMMERCIAL

## 2018-03-23 ENCOUNTER — TELEPHONE (OUTPATIENT)
Dept: INFECTIOUS DISEASES | Facility: CLINIC | Age: 23
End: 2018-03-23

## 2018-03-23 ENCOUNTER — HOSPITAL ENCOUNTER (OUTPATIENT)
Facility: HOSPITAL | Age: 23
Discharge: HOME | End: 2018-03-23
Attending: INTERNAL MEDICINE | Admitting: INTERNAL MEDICINE
Payer: COMMERCIAL

## 2018-03-23 VITALS
WEIGHT: 120 LBS | TEMPERATURE: 97 F | HEART RATE: 64 BPM | SYSTOLIC BLOOD PRESSURE: 114 MMHG | HEIGHT: 63 IN | DIASTOLIC BLOOD PRESSURE: 56 MMHG | RESPIRATION RATE: 16 BRPM | OXYGEN SATURATION: 100 % | BODY MASS INDEX: 21.26 KG/M2

## 2018-03-23 DIAGNOSIS — R59.0 MEDIASTINAL LYMPHADENOPATHY: Primary | ICD-10-CM

## 2018-03-23 DIAGNOSIS — R59.0 MEDIASTINAL ADENOPATHY: Primary | ICD-10-CM

## 2018-03-23 DIAGNOSIS — R59.1 LYMPHADENOPATHY: ICD-10-CM

## 2018-03-23 LAB
B-HCG UR QL: NEGATIVE
CTP QC/QA: YES
GRAM STN SPEC: NORMAL
GRAM STN SPEC: NORMAL

## 2018-03-23 PROCEDURE — 87102 FUNGUS ISOLATION CULTURE: CPT

## 2018-03-23 PROCEDURE — 88184 FLOWCYTOMETRY/ TC 1 MARKER: CPT | Performed by: PATHOLOGY

## 2018-03-23 PROCEDURE — 43242 EGD US FINE NEEDLE BX/ASPIR: CPT | Mod: ,,, | Performed by: INTERNAL MEDICINE

## 2018-03-23 PROCEDURE — 88173 CYTOPATH EVAL FNA REPORT: CPT | Mod: 26,,, | Performed by: PATHOLOGY

## 2018-03-23 PROCEDURE — 25000003 PHARM REV CODE 250: Performed by: INTERNAL MEDICINE

## 2018-03-23 PROCEDURE — 37000008 HC ANESTHESIA 1ST 15 MINUTES: Performed by: INTERNAL MEDICINE

## 2018-03-23 PROCEDURE — 87075 CULTR BACTERIA EXCEPT BLOOD: CPT

## 2018-03-23 PROCEDURE — 87206 SMEAR FLUORESCENT/ACID STAI: CPT

## 2018-03-23 PROCEDURE — 88172 CYTP DX EVAL FNA 1ST EA SITE: CPT | Mod: 26,,, | Performed by: PATHOLOGY

## 2018-03-23 PROCEDURE — 63600175 PHARM REV CODE 636 W HCPCS: Performed by: NURSE ANESTHETIST, CERTIFIED REGISTERED

## 2018-03-23 PROCEDURE — 87205 SMEAR GRAM STAIN: CPT

## 2018-03-23 PROCEDURE — 88188 FLOWCYTOMETRY/READ 9-15: CPT | Mod: ,,, | Performed by: PATHOLOGY

## 2018-03-23 PROCEDURE — D9220A PRA ANESTHESIA: Mod: CRNA,,, | Performed by: NURSE ANESTHETIST, CERTIFIED REGISTERED

## 2018-03-23 PROCEDURE — 87015 SPECIMEN INFECT AGNT CONCNTJ: CPT

## 2018-03-23 PROCEDURE — D9220A PRA ANESTHESIA: Mod: ANES,,, | Performed by: ANESTHESIOLOGY

## 2018-03-23 PROCEDURE — 81025 URINE PREGNANCY TEST: CPT | Performed by: INTERNAL MEDICINE

## 2018-03-23 PROCEDURE — 88305 TISSUE EXAM BY PATHOLOGIST: CPT | Mod: 26,,, | Performed by: PATHOLOGY

## 2018-03-23 PROCEDURE — 87116 MYCOBACTERIA CULTURE: CPT

## 2018-03-23 PROCEDURE — 87070 CULTURE OTHR SPECIMN AEROBIC: CPT

## 2018-03-23 PROCEDURE — 43242 EGD US FINE NEEDLE BX/ASPIR: CPT | Performed by: INTERNAL MEDICINE

## 2018-03-23 PROCEDURE — 88305 TISSUE EXAM BY PATHOLOGIST: CPT | Performed by: PATHOLOGY

## 2018-03-23 PROCEDURE — 37000009 HC ANESTHESIA EA ADD 15 MINS: Performed by: INTERNAL MEDICINE

## 2018-03-23 PROCEDURE — 27202131 HC NEEDLE, FNB SINGLE (ANY): Performed by: INTERNAL MEDICINE

## 2018-03-23 PROCEDURE — 88185 FLOWCYTOMETRY/TC ADD-ON: CPT | Performed by: PATHOLOGY

## 2018-03-23 RX ORDER — ONDANSETRON 2 MG/ML
INJECTION INTRAMUSCULAR; INTRAVENOUS
Status: DISCONTINUED | OUTPATIENT
Start: 2018-03-23 | End: 2018-03-23

## 2018-03-23 RX ORDER — PROPOFOL 10 MG/ML
VIAL (ML) INTRAVENOUS
Status: DISCONTINUED | OUTPATIENT
Start: 2018-03-23 | End: 2018-03-23

## 2018-03-23 RX ORDER — DEXAMETHASONE SODIUM PHOSPHATE 4 MG/ML
INJECTION, SOLUTION INTRA-ARTICULAR; INTRALESIONAL; INTRAMUSCULAR; INTRAVENOUS; SOFT TISSUE
Status: DISCONTINUED | OUTPATIENT
Start: 2018-03-23 | End: 2018-03-23

## 2018-03-23 RX ORDER — SODIUM CHLORIDE 9 MG/ML
INJECTION, SOLUTION INTRAVENOUS CONTINUOUS
Status: DISCONTINUED | OUTPATIENT
Start: 2018-03-23 | End: 2018-03-23 | Stop reason: HOSPADM

## 2018-03-23 RX ORDER — LIDOCAINE HCL/PF 100 MG/5ML
SYRINGE (ML) INTRAVENOUS
Status: DISCONTINUED | OUTPATIENT
Start: 2018-03-23 | End: 2018-03-23

## 2018-03-23 RX ORDER — PROPOFOL 10 MG/ML
VIAL (ML) INTRAVENOUS CONTINUOUS PRN
Status: DISCONTINUED | OUTPATIENT
Start: 2018-03-23 | End: 2018-03-23

## 2018-03-23 RX ORDER — PREDNISONE 20 MG/1
TABLET ORAL
Qty: 60 TABLET | Refills: 1 | Status: SHIPPED | OUTPATIENT
Start: 2018-03-23 | End: 2018-06-01

## 2018-03-23 RX ORDER — FENTANYL CITRATE 50 UG/ML
INJECTION, SOLUTION INTRAMUSCULAR; INTRAVENOUS
Status: DISCONTINUED | OUTPATIENT
Start: 2018-03-23 | End: 2018-03-23

## 2018-03-23 RX ORDER — ITRACONAZOLE 100 MG/1
200 CAPSULE ORAL 2 TIMES DAILY
Qty: 120 CAPSULE | Refills: 2 | Status: SHIPPED | OUTPATIENT
Start: 2018-03-23 | End: 2018-04-22

## 2018-03-23 RX ORDER — MIDAZOLAM HYDROCHLORIDE 1 MG/ML
INJECTION, SOLUTION INTRAMUSCULAR; INTRAVENOUS
Status: DISCONTINUED | OUTPATIENT
Start: 2018-03-23 | End: 2018-03-23

## 2018-03-23 RX ADMIN — SODIUM CHLORIDE: 0.9 INJECTION, SOLUTION INTRAVENOUS at 09:03

## 2018-03-23 RX ADMIN — DEXAMETHASONE SODIUM PHOSPHATE 8 MG: 4 INJECTION, SOLUTION INTRAMUSCULAR; INTRAVENOUS at 10:03

## 2018-03-23 RX ADMIN — SODIUM CHLORIDE: 0.9 INJECTION, SOLUTION INTRAVENOUS at 08:03

## 2018-03-23 RX ADMIN — MIDAZOLAM HYDROCHLORIDE 2 MG: 1 INJECTION, SOLUTION INTRAMUSCULAR; INTRAVENOUS at 09:03

## 2018-03-23 RX ADMIN — PROPOFOL 200 MCG/KG/MIN: 10 INJECTION, EMULSION INTRAVENOUS at 09:03

## 2018-03-23 RX ADMIN — FENTANYL CITRATE 25 MCG: 50 INJECTION, SOLUTION INTRAMUSCULAR; INTRAVENOUS at 09:03

## 2018-03-23 RX ADMIN — ONDANSETRON 4 MG: 2 INJECTION INTRAMUSCULAR; INTRAVENOUS at 09:03

## 2018-03-23 RX ADMIN — LIDOCAINE HYDROCHLORIDE 50 MG: 20 INJECTION, SOLUTION INTRAVENOUS at 09:03

## 2018-03-23 RX ADMIN — PROPOFOL 40 MG: 10 INJECTION, EMULSION INTRAVENOUS at 09:03

## 2018-03-23 NOTE — PROVATION PATIENT INSTRUCTIONS
Discharge Summary/Instructions after an Endoscopic Procedure  Patient Name: Donna Kumar  Patient MRN: [id number]  Patient YOB: 1995 Friday, March 23, 2018  Chuck Kumar MD  RESTRICTIONS:  During your procedure today, you received medications for sedation.  These   medications may affect your judgment, balance and coordination.  Therefore,   for 24 hours, you have the following restrictions:   - DO NOT drive a car, operate machinery, make legal/financial decisions,   sign important papers or drink alcohol.    ACTIVITY:  The following day: return to full activity including work, except no heavy   lifting, straining or running for 3 days if polyps were removed.  DIET:  Eat and drink normally unless instructed otherwise.     TREATMENT FOR COMMON SIDE EFFECTS:  - Mild abdominal pain, nausea, belching, bloating or excessive gas:  rest,   eat lightly and use a heating pad.  - Sore Throat: treat with throat lozenges and/or gargle with warm salt   water.  - Because air was used during the procedure, expelling large amounts of air   from your rectum or belching is normal.  - If a bowel prep was taken, you may not have a bowel movement for 1-3 days.    This is normal.  SYMPTOMS TO WATCH FOR AND REPORT TO YOUR PHYSICIAN:  1. Abdominal pain or bloating, other than gas cramps.  2. Chest pain.  3. Back pain.  4. Signs of infection such as: chills or fever occurring within 24 hours   after the procedure.  5. Rectal bleeding, which would show as bright red, maroon, or black stools.   (A tablespoon of blood from the rectum is not serious, especially if   hemorrhoids are present.)  6. Vomiting.  7. Weakness or dizziness.  GO DIRECTLY TO THE NEAREST EMERGENCY ROOM IF YOU HAVE ANY OF THE FOLLOWING:      Difficulty breathing              Chills and/or fever over 101 F   Persistent vomiting and/or vomiting blood   Severe abdominal pain   Severe chest pain   Black, tarry stools   Bleeding- more than one tablespoon   Any  other symptom or condition that you feel may need urgent attention  Your doctor recommends these additional instructions:  If any biopsies were taken, your doctors clinic will contact you in 1 to 2   weeks with any results.  You are being discharged to home.   Resume your previous diet.   Continue your present medications.   Return to your referring physician at appointment to be scheduled.  For questions, problems or results please call your physician - Chuck Kumar MD at Work:  [phone].  [facility], EMERGENCY ROOM PHONE NUMBER: [phone]  IF A COMPLICATION OR EMERGENCY SITUATION ARISES AND YOU ARE UNABLE TO REACH   YOUR PHYSICIAN - GO DIRECTLY TO THE EMERGENCY ROOM.  Chuck Kumar MD  3/23/2018 10:20:29 AM  This report has been verified and signed electronically.

## 2018-03-23 NOTE — H&P
Short Stay Endoscopy History and Physical    PCP - Brooklynn Boyle MD  Referring Physician - Will Puente MD  [address] [place] [address]    Procedure - eus  ASA - per anesthesia  Mallampati - per anesthesia  History of Anesthesia problems - no  Family history Anesthesia problems -  no   Plan of anesthesia - General    HPI:  This is a 22 y.o. female here for evaluation of: dysphagia    Reflux - no  Dysphagia - no  Abdominal pain - no  Diarrhea - no    ROS:  Constitutional: No fevers, chills, No weight loss  CV: No chest pain  Pulm: No cough, No shortness of breath  Ophtho: No vision changes  GI: see HPI  Derm: No rash    Medical History:  has a past medical history of B12 deficiency; Iron deficiency; Mastocytosis; Mediastinal adenopathy (\); and Obsessive compulsive disorder.    Surgical History:  has a past surgical history that includes Lung biopsy (03/2017) and Bronchoscopy.    Family History: family history includes Allergies in her brother and mother; Cancer in her mother; Depression in her brother; Eczema in her brother; Hyperlipidemia in her mother; Melanoma in her mother; Mental illness in her sister; Obesity in her brother; Other in her father..    Social History:  reports that she has never smoked. She has never used smokeless tobacco. She reports that she drinks alcohol. She reports that she does not use drugs.    Review of patient's allergies indicates:   Allergen Reactions    No known drug allergies        Medications:   Prescriptions Prior to Admission   Medication Sig Dispense Refill Last Dose    ACZONE 5 % topical gel Apply topically every morning. 60 g 3 Past Week at Unknown time    norethindrone-ethinyl estradiol (OVCON) 0.4-35 mg-mcg per tablet Take 1 tablet by mouth once daily. 30 tablet 11 3/22/2018 at Unknown time    tazarotene (TAZORAC) 0.05 % Crea cream Apply topically every evening. Apply thin film to face qhs after moisturizing. 60 g 1 Past Week at Unknown time     fluoxetine 10 MG Tab Take 1 tablet (10 mg total) by mouth once daily. (Patient taking differently: Take 20 mg by mouth once daily. ) 30 tablet 3 3/18/2018 at Unknown time       Physical Exam:    Vital Signs:   Vitals:    03/23/18 0821   BP: 110/69   Pulse: 77   Resp: 17   Temp: 97.6 °F (36.4 °C)       General Appearance: Well appearing in no acute distress  Eyes:    No scleral icterus  ENT: Neck supple  Lungs: CTA anteriorly  Heart:  Regular rate  Abdomen: Soft, non tender, non distended with normal bowel sounds.  Extremities: No edema  Skin: No rash    Labs:  Lab Results   Component Value Date    WBC 7.99 03/17/2018    HGB 12.8 03/17/2018    HCT 39 03/17/2018     03/17/2018    CHOL 193 03/09/2017    TRIG 124 03/09/2017    HDL 67 03/09/2017    ALT 12 03/17/2018    AST 19 03/17/2018     03/17/2018    K 4.2 03/17/2018     03/17/2018    CREATININE 0.8 03/17/2018    BUN 9 03/17/2018    CO2 28 03/17/2018    TSH 0.944 03/09/2017       I have explained the risks and benefits of this endoscopic procedure to the patient including but not limited to bleeding, inflammation, infection, perforation, and death.      Chuck Kumar MD

## 2018-03-23 NOTE — DISCHARGE INSTRUCTIONS
Endoscopic Ultrasound (EUS)    An endoscopic ultrasound (EUS) is a test to look at the inside of your gastrointestinal (GI) tract. It's commonly used to look for cancers or growths in the esophagus, stomach, pancreas, liver, and rectum. It can help to stage cancer (see how advanced a cancer is). EUS may also be used to help diagnose certain diseases or to drain cysts or abscesses.  What is EUS?  EUS shows both ultrasound images and live video of the GI tract. During the test, a flexible tube called an endoscope (scope) is used. At the end of the scope is a tiny video camera and light. The video camera sends live images to a monitor. The scope also contains a very small ultrasound device. This uses sound waves to create images and send them to a monitor.  A needle is passed through the scope. The needle can be used take a small sample of tissue for testing. This is called a biopsy. The needle can be used to take a sample of fluid. This is called fine-needle aspiration (FNA).  Risks and possible complications of EUS  Risks and possible complications include the following:  · Bleeding  · Infection  · A perforation (hole) in the digestive tract   · Risks of sedation or anesthesia   Before the test  Be prepared prior to the test:  · Tell your healthcare provider what medicine you take. This includes vitamins, herbs, and over-the-counter medicine. It also includes any blood thinners, such as warfarin, clopidogrel, ibuprofen, or daily aspirin. Ask your healthcare provider if you need to stop taking some or all of them before the test.  · You may be prescribed antibiotics to take before or after the test. This depends on the area being studied and what is done during the test. These medicines help prevent infection.  · Carefully follow the instructions for preparing for the test to make sure results are accurate. Instructions may include:  ¨ If youre having an EUS of the upper GI tract (esophagus, stomach, duodenum,  pancreas, liver):  § Do not eat or drink for 6 hours before the test.  ¨ If youre having an EUS of the lower GI tract (rectum):  § Before the test, do bowel prep as instructed to clean your rectum of stool. This may involve a clear liquid diet and using a laxative (liquid or pills) the night before the test. Or it may mean doing one or more enemas the morning of the test.  § Do not eat or drink for 6 hours before the test.  · Be sure to arrive on time at the facility. Bring your identification and health insurance card. Leave valuables at home. If you have them, bring X-rays or other test results with you.  Let the healthcare provider know  For your safety, tell the healthcare provider if you:  · Take insulin. Your dose may need to be changed on the day of your test.  · Are allergic to latex.  · Have any other allergies.  · Are taking blood thinners.   During the test  An endoscopic ultrasound usually takes place in a hospital. The procedure itself may take 1 to 2 hours. You will likely go home soon afterward. During the test:  · You lie on your left side on an exam table.  · An intravenous (IV) line will be put into a vein in your arm or hand. This line supplies fluids and medicines. To keep you comfortable during the test, you will be given a sedative medicine. This medicine prevents discomfort and will make you sleepy.  · If you are having an EUS of the upper GI tract, local anesthetic may be sprayed in your throat. This will help you be more comfortable as the healthcare provider inserts the scope. The healthcare provider then gently puts the flexible scope into your mouth or nose and down your throat.  · If youre having an EUS of the lower GI tract, the healthcare provider gently puts the flexible scope into your anus.  · During the test, the scope sends live video and ultrasound images from inside your body to nearby monitors. These are used to examine your GI tract. Specialized procedures, such as drainage,  are done as needed.  · The healthcare provider may discuss the results with you soon after the test. Biopsy results take several  days.  · In most cases, you can go home within a few hours of the test. When you leave the facility, have an adult family member or friend drive you, even if you don't feel that sleepy.  After the test  Here is what to expect after the test:  · You may feel tired from the sedative. This should wear off by the end of the day.  · If you had an upper digestive endoscopy, your throat may feel sore for a day or two. Over-the-counter sore throat lozenges and spray should help.  · You can eat and drink normally as soon as the test is done.  When to call the healthcare provider  Call your healthcare provider if you notice any of the following:  · Fever of 100.4°F (38.0°C) or higher, or as advised by your healthcare provider  · Shortness of breath  · Vomiting blood, blood in stool, or black stools  · Coughing or hoarse voice that wont go away   Date Last Reviewed: 7/1/2016  © 2588-9439 [employer]. [address] [place] [address]. All rights reserved. This information is not intended as a substitute for professional medical care. Always follow your healthcare professional's instructions.

## 2018-03-23 NOTE — ANESTHESIA RELEASE NOTE
"Anesthesia Release from PACU Note    Patient: Madeline Collette Evans    Procedure(s) Performed: Procedure(s) (LRB):  ULTRASOUND-ENDOSCOPIC-UPPER (N/A)    Anesthesia type: general    Post pain: Adequate analgesia    Post assessment: no apparent anesthetic complications, tolerated procedure well and no evidence of recall    Last Vitals:   Visit Vitals  BP (!) 114/56   Pulse 64   Temp 36.3 °C (97.3 °F) (Temporal)   Resp 16   Ht 5' 3" (1.6 m)   Wt 54.4 kg (120 lb)   LMP 03/10/2018   SpO2 100%   Breastfeeding? Unknown   BMI 21.26 kg/m²       Post vital signs: stable    Level of consciousness: awake, alert  and oriented    Nausea/Vomiting: no nausea/no vomiting    Complications: none    Airway Patency: patent    Respiratory: unassisted, spontaneous ventilation, room air    Cardiovascular: stable and blood pressure at baseline    Hydration: euvolemic  "

## 2018-03-23 NOTE — TELEPHONE ENCOUNTER
Need to order this test - I put in as a mischellaneous - patient is having scope done this am and can have labs after.

## 2018-03-23 NOTE — ANESTHESIA PREPROCEDURE EVALUATION
03/23/2018  Madeline Collette Evans is a 22 y.o., female.    Anesthesia Evaluation    I have reviewed the Patient Summary Reports.    I have reviewed the Nursing Notes.   I have reviewed the Medications.     Review of Systems  Anesthesia Hx:  No problems with previous Anesthesia  History of prior surgery of interest to airway management or planning: Previous anesthesia: General Denies Family Hx of Anesthesia complications.   Denies Personal Hx of Anesthesia complications.   Social:  Non-Smoker    Hematology/Oncology:  Hematology Normal   Oncology Normal     EENT/Dental:EENT/Dental Normal   Cardiovascular:  Cardiovascular Normal Exercise tolerance: good     Pulmonary:   Mediastinal adenopathy   Renal/:  Renal/ Normal     Hepatic/GI:   Dysphagia   Musculoskeletal:  Musculoskeletal Normal    Neurological:  Neurology Normal    Endocrine:  Endocrine Normal    Dermatological:  Skin Normal    Psych:   Psychiatric History OCD         Physical Exam  General:  Well nourished    Airway/Jaw/Neck:  Airway Findings: Mouth Opening: Normal Tongue: Normal  General Airway Assessment: Adult, Average  Mallampati: I  Improves to I with phonation.  TM Distance: Normal, at least 6 cm        Eyes/Ears/Nose:  EYES/EARS/NOSE FINDINGS: Normal   Dental:  Dental Findings: In tact   Chest/Lungs:  Chest/Lungs Findings: Clear to auscultation, Normal Respiratory Rate     Heart/Vascular:  Heart Findings: Rate: Normal  Rhythm: Regular Rhythm  Sounds: Normal  Heart murmur: negative Vascular Findings: Normal    Abdomen:  Abdomen Findings: Normal    Musculoskeletal:  Musculoskeletal Findings: Normal   Skin:  Skin Findings: Normal    Mental Status:  Mental Status Findings:  Cooperative, Alert and Oriented         Anesthesia Plan  Type of Anesthesia, risks & benefits discussed:  Anesthesia Type:  general  Patient's Preference:   Intra-op  Monitoring Plan: standard ASA monitors  Intra-op Monitoring Plan Comments:   Post Op Pain Control Plan:   Post Op Pain Control Plan Comments:   Induction:   IV  Beta Blocker:  Patient is not currently on a Beta-Blocker (No further documentation required).       Informed Consent: Patient understands risks and agrees with Anesthesia plan.  Questions answered. Anesthesia consent signed with patient.  ASA Score: 2     Day of Surgery Review of History & Physical:            Ready For Surgery From Anesthesia Perspective.

## 2018-03-23 NOTE — TELEPHONE ENCOUNTER
All labs entered.   Await path results and contact Elliott.    Medications discussed were sent to pharmacy.    Tamara,  Let her know.

## 2018-03-23 NOTE — TRANSFER OF CARE
"Anesthesia Transfer of Care Note    Patient: Madeline Collette Evans    Procedure(s) Performed: Procedure(s) (LRB):  ULTRASOUND-ENDOSCOPIC-UPPER (N/A)    Patient location: PACU    Anesthesia Type: general    Transport from OR: Transported from OR on 2-3 L/min O2 by NC with adequate spontaneous ventilation    Post pain: adequate analgesia    Post assessment: no apparent anesthetic complications    Post vital signs: stable    Level of consciousness: awake, alert and oriented    Nausea/Vomiting: no nausea/vomiting    Complications: none    Transfer of care protocol was followed      Last vitals:   Visit Vitals  /69   Pulse 77   Temp 36.4 °C (97.6 °F)   Resp 17   Ht 5' 3" (1.6 m)   Wt 54.4 kg (120 lb)   LMP 03/10/2018   SpO2 98%   Breastfeeding? Unknown   BMI 21.26 kg/m²     "

## 2018-03-23 NOTE — ANESTHESIA POSTPROCEDURE EVALUATION
"Anesthesia Post Evaluation    Patient: Madeline Collette Evans    Procedure(s) Performed: Procedure(s) (LRB):  ULTRASOUND-ENDOSCOPIC-UPPER (N/A)    Final Anesthesia Type: general  Patient location during evaluation: PACU  Patient participation: Yes- Able to Participate  Level of consciousness: awake and alert and oriented  Post-procedure vital signs: reviewed and stable  Pain management: adequate  Airway patency: patent  PONV status at discharge: No PONV  Anesthetic complications: no      Cardiovascular status: hemodynamically stable  Respiratory status: unassisted, spontaneous ventilation and room air  Hydration status: euvolemic  Follow-up not needed.        Visit Vitals  BP (!) 114/56   Pulse 64   Temp 36.3 °C (97.3 °F) (Temporal)   Resp 16   Ht 5' 3" (1.6 m)   Wt 54.4 kg (120 lb)   LMP 03/10/2018   SpO2 100%   Breastfeeding? Unknown   BMI 21.26 kg/m²       Pain/Cecilia Score: Pain Assessment Performed: Yes (3/23/2018 11:58 AM)  Presence of Pain: denies (3/23/2018 11:58 AM)      "

## 2018-03-23 NOTE — PLAN OF CARE
Problem: Patient Care Overview  Goal: Plan of Care Review  Outcome: Outcome(s) achieved Date Met: 03/23/18  Awake and alert. VSS. Denies pain or nausea. Tolerating liquids well. Voiding well. DC instructions given to patient and family and they verbalize understanding. Patient's ordered lab was drawn by  and walked to lab. Also urine was sent as ordered.

## 2018-03-26 ENCOUNTER — TELEPHONE (OUTPATIENT)
Dept: ENDOSCOPY | Facility: HOSPITAL | Age: 23
End: 2018-03-26

## 2018-03-26 LAB
BACTERIA SPEC AEROBE CULT: NO GROWTH
FLOW CYTOMETRY ANTIBODIES ANALYZED - LYMPH NODE: NORMAL
FLOW CYTOMETRY COMMENT - LYMPH NODE: NORMAL
FLOW CYTOMETRY INTERPRETATION - LYMPH NODE: NORMAL

## 2018-03-27 ENCOUNTER — PATIENT MESSAGE (OUTPATIENT)
Dept: GASTROENTEROLOGY | Facility: HOSPITAL | Age: 23
End: 2018-03-27

## 2018-03-28 ENCOUNTER — TELEPHONE (OUTPATIENT)
Dept: INFECTIOUS DISEASES | Facility: HOSPITAL | Age: 23
End: 2018-03-28

## 2018-03-28 NOTE — TELEPHONE ENCOUNTER
Sanjeev Belle  [phone]  Contacted to discuss and number comes back as error.  Sent email and will await response.

## 2018-04-02 LAB — BACTERIA SPEC ANAEROBE CULT: NORMAL

## 2018-04-24 LAB — FUNGUS SPEC CULT: NORMAL

## 2018-05-08 ENCOUNTER — TELEPHONE (OUTPATIENT)
Dept: INFECTIOUS DISEASES | Facility: CLINIC | Age: 23
End: 2018-05-08

## 2018-05-08 DIAGNOSIS — R59.0 ENLARGED LYMPH NODES IN ARMPIT: ICD-10-CM

## 2018-05-08 NOTE — TELEPHONE ENCOUNTER
Patient will return to town after may 13th.     Will schedule CT chest and PET scan.    Tamara,  Please schedule - it may need pre-auth.

## 2018-05-14 ENCOUNTER — PATIENT MESSAGE (OUTPATIENT)
Dept: INFECTIOUS DISEASES | Facility: CLINIC | Age: 23
End: 2018-05-14

## 2018-05-25 LAB
ACID FAST MOD KINY STN SPEC: NORMAL
MYCOBACTERIUM SPEC QL CULT: NORMAL

## 2018-05-29 ENCOUNTER — HOSPITAL ENCOUNTER (OUTPATIENT)
Dept: RADIOLOGY | Facility: HOSPITAL | Age: 23
Discharge: HOME | End: 2018-05-29
Attending: INTERNAL MEDICINE
Payer: COMMERCIAL

## 2018-05-29 DIAGNOSIS — R59.0 ENLARGED LYMPH NODES IN ARMPIT: ICD-10-CM

## 2018-05-29 LAB — POCT GLUCOSE: 77 MG/DL (ref 70–110)

## 2018-05-29 PROCEDURE — 71260 CT THORAX DX C+: CPT | Mod: TC

## 2018-05-29 PROCEDURE — 74160 CT ABDOMEN W/CONTRAST: CPT | Mod: TC

## 2018-05-29 PROCEDURE — 25500020 PHARM REV CODE 255: Performed by: INTERNAL MEDICINE

## 2018-05-29 PROCEDURE — 71260 CT THORAX DX C+: CPT | Mod: 26,,, | Performed by: RADIOLOGY

## 2018-05-29 PROCEDURE — A9552 F18 FDG: HCPCS

## 2018-05-29 PROCEDURE — 78815 PET IMAGE W/CT SKULL-THIGH: CPT | Mod: TC

## 2018-05-29 PROCEDURE — 78815 PET IMAGE W/CT SKULL-THIGH: CPT | Mod: 26,PS,, | Performed by: RADIOLOGY

## 2018-05-29 PROCEDURE — 74160 CT ABDOMEN W/CONTRAST: CPT | Mod: 26,,, | Performed by: RADIOLOGY

## 2018-05-29 RX ADMIN — IOHEXOL 15 ML: 350 INJECTION, SOLUTION INTRAVENOUS at 03:05

## 2018-05-29 RX ADMIN — IOHEXOL 75 ML: 350 INJECTION, SOLUTION INTRAVENOUS at 05:05

## 2018-05-29 RX ADMIN — IOHEXOL 15 ML: 350 INJECTION, SOLUTION INTRAVENOUS at 05:05

## 2018-06-01 ENCOUNTER — LAB VISIT (OUTPATIENT)
Dept: LAB | Facility: HOSPITAL | Age: 23
End: 2018-06-01
Attending: INTERNAL MEDICINE
Payer: COMMERCIAL

## 2018-06-01 ENCOUNTER — CLINICAL SUPPORT (OUTPATIENT)
Dept: INTERNAL MEDICINE | Facility: CLINIC | Age: 23
End: 2018-06-01
Payer: COMMERCIAL

## 2018-06-01 ENCOUNTER — OFFICE VISIT (OUTPATIENT)
Dept: INTERNAL MEDICINE | Facility: CLINIC | Age: 23
End: 2018-06-01
Payer: COMMERCIAL

## 2018-06-01 VITALS
WEIGHT: 123.88 LBS | DIASTOLIC BLOOD PRESSURE: 72 MMHG | HEART RATE: 76 BPM | HEIGHT: 63 IN | SYSTOLIC BLOOD PRESSURE: 122 MMHG | BODY MASS INDEX: 21.95 KG/M2 | TEMPERATURE: 98 F

## 2018-06-01 DIAGNOSIS — E53.8 B12 DEFICIENCY: ICD-10-CM

## 2018-06-01 DIAGNOSIS — Z11.1 TUBERCULOSIS SCREENING: Primary | ICD-10-CM

## 2018-06-01 DIAGNOSIS — Z11.1 TUBERCULOSIS SCREENING: ICD-10-CM

## 2018-06-01 DIAGNOSIS — Z00.00 ANNUAL PHYSICAL EXAM: Primary | ICD-10-CM

## 2018-06-01 DIAGNOSIS — E61.1 IRON DEFICIENCY: ICD-10-CM

## 2018-06-01 DIAGNOSIS — Z23 NEED FOR TDAP VACCINATION: ICD-10-CM

## 2018-06-01 DIAGNOSIS — Z00.00 ANNUAL PHYSICAL EXAM: ICD-10-CM

## 2018-06-01 LAB
ALBUMIN SERPL BCP-MCNC: 3.6 G/DL
ALP SERPL-CCNC: 52 U/L
ALT SERPL W/O P-5'-P-CCNC: 15 U/L
ANION GAP SERPL CALC-SCNC: 8 MMOL/L
AST SERPL-CCNC: 19 U/L
BASOPHILS # BLD AUTO: 0.03 K/UL
BASOPHILS NFR BLD: 0.6 %
BILIRUB SERPL-MCNC: 0.3 MG/DL
BUN SERPL-MCNC: 11 MG/DL
CALCIUM SERPL-MCNC: 9.3 MG/DL
CHLORIDE SERPL-SCNC: 105 MMOL/L
CHOLEST SERPL-MCNC: 198 MG/DL
CHOLEST/HDLC SERPL: 2.5 {RATIO}
CO2 SERPL-SCNC: 26 MMOL/L
CREAT SERPL-MCNC: 0.7 MG/DL
DIFFERENTIAL METHOD: NORMAL
EOSINOPHIL # BLD AUTO: 0.1 K/UL
EOSINOPHIL NFR BLD: 2.4 %
ERYTHROCYTE [DISTWIDTH] IN BLOOD BY AUTOMATED COUNT: 12.9 %
EST. GFR  (AFRICAN AMERICAN): >60 ML/MIN/1.73 M^2
EST. GFR  (NON AFRICAN AMERICAN): >60 ML/MIN/1.73 M^2
FERRITIN SERPL-MCNC: 39 NG/ML
GLUCOSE SERPL-MCNC: 80 MG/DL
HCT VFR BLD AUTO: 37.5 %
HDLC SERPL-MCNC: 79 MG/DL
HDLC SERPL: 39.9 %
HGB BLD-MCNC: 12.5 G/DL
IRON SERPL-MCNC: 73 UG/DL
LDLC SERPL CALC-MCNC: 100.6 MG/DL
LYMPHOCYTES # BLD AUTO: 1.3 K/UL
LYMPHOCYTES NFR BLD: 24.3 %
MCH RBC QN AUTO: 29.3 PG
MCHC RBC AUTO-ENTMCNC: 33.3 G/DL
MCV RBC AUTO: 88 FL
MONOCYTES # BLD AUTO: 0.3 K/UL
MONOCYTES NFR BLD: 5.4 %
NEUTROPHILS # BLD AUTO: 3.6 K/UL
NEUTROPHILS NFR BLD: 67.1 %
NONHDLC SERPL-MCNC: 119 MG/DL
PLATELET # BLD AUTO: 285 K/UL
PMV BLD AUTO: 10.8 FL
POTASSIUM SERPL-SCNC: 4.1 MMOL/L
PROT SERPL-MCNC: 7.1 G/DL
RBC # BLD AUTO: 4.26 M/UL
SATURATED IRON: 14 %
SODIUM SERPL-SCNC: 139 MMOL/L
TOTAL IRON BINDING CAPACITY: 515 UG/DL
TRANSFERRIN SERPL-MCNC: 348 MG/DL
TRIGL SERPL-MCNC: 92 MG/DL
TSH SERPL DL<=0.005 MIU/L-ACNC: 1.66 UIU/ML
WBC # BLD AUTO: 5.39 K/UL

## 2018-06-01 PROCEDURE — 82607 VITAMIN B-12: CPT

## 2018-06-01 PROCEDURE — 99999 PR PBB SHADOW E&M-EST. PATIENT-LVL III: CPT | Mod: PBBFAC,,, | Performed by: INTERNAL MEDICINE

## 2018-06-01 PROCEDURE — 86765 RUBEOLA ANTIBODY: CPT

## 2018-06-01 PROCEDURE — 86706 HEP B SURFACE ANTIBODY: CPT

## 2018-06-01 PROCEDURE — 80061 LIPID PANEL: CPT

## 2018-06-01 PROCEDURE — 84443 ASSAY THYROID STIM HORMONE: CPT

## 2018-06-01 PROCEDURE — 86762 RUBELLA ANTIBODY: CPT

## 2018-06-01 PROCEDURE — 99395 PREV VISIT EST AGE 18-39: CPT | Mod: S$GLB,,, | Performed by: INTERNAL MEDICINE

## 2018-06-01 PROCEDURE — 83921 ORGANIC ACID SINGLE QUANT: CPT

## 2018-06-01 PROCEDURE — 80053 COMPREHEN METABOLIC PANEL: CPT

## 2018-06-01 PROCEDURE — 86735 MUMPS ANTIBODY: CPT

## 2018-06-01 PROCEDURE — 82728 ASSAY OF FERRITIN: CPT

## 2018-06-01 PROCEDURE — 86787 VARICELLA-ZOSTER ANTIBODY: CPT

## 2018-06-01 PROCEDURE — 85025 COMPLETE CBC W/AUTO DIFF WBC: CPT

## 2018-06-01 PROCEDURE — 83540 ASSAY OF IRON: CPT

## 2018-06-01 RX ORDER — FLUOXETINE HYDROCHLORIDE 20 MG/1
20 CAPSULE ORAL DAILY
Refills: 1 | COMMUNITY
Start: 2018-04-30 | End: 2019-04-12 | Stop reason: SDUPTHER

## 2018-06-01 NOTE — PROGRESS NOTES
INTERNAL MEDICINE ANNUAL VISIT NOTE      CHIEF COMPLAINT     ANNUAL    HPI     Madeline Collette Evans is a 23 y.o. C female who presents for her annual.    Night sweats, chest tightness, pressure, SOB, dysphagia x 4 weeks. S/p Zpack w/ improved congestion but dev substernal CP on inspiration. Saw me initially 3/9/17.  CXR 3/9/17 - asymmetrical chronic granulomatous change right hilum.  CT chest 3/9/17-large right hilar and subcarinal necrotic lymph node enlargement with associated mild consolidation with air bronchograms in the right middle lobe.  Vague area of hyper enhancement in segment 7 of the liver which could relate to F and age or adenoma.  Nonspecific splenic lesions.  Narrowing of the bronchus intermedius for mass effect of subcarinal lymph node.  S/p Levaquin and repeat CT chest.  Saw Dr. Kim 3/16/17  EBUS 3/17/17 - partially obstructing (fungating, infiltrative and necrotic) mass of the R mainstem bronchus. R hilar LAD, mediastinal adenopathy.  Path - acute inflammation and neg for PAS, AFB. Mixed acute and chronic inflammation, necrosis, granulomatous. No malignancy  CT chest, abdomen and pelvis 4/13/17-mediastinal and right hilar lymphadenopathy.  Scattered clusters of pulmonary micronodules including a 0.8 cm pulmonary nodule in the right lower lobes.  Area of arterial enhancement within the dome of the liver favored to represent variant perfusion.  Nonspecific splenic lesion, unchanged from prior examination.  Given course of steroids.  Dr. Lynn 4/16/17 - TB PCR neg.  Dr. Molina 5/10/17   Ultrasound abdomen 5/10/17-minimally complex, sonographically nonspecific cystic structure within the mid body of the spleen measuring 1.8×1.6×1.4 cm.  Mediastinoscopy and flex bronch 5/11/17 - no stenosis or mass. R mainstem post membranous mucosal abnormality. Biopsy w/ necrotizing granuloma.  Dr. Ace 5/17/17  PET CT 5/17/17 - mediastinal and R hilar LAD.  BM biopsy 5/18/17 - normal  R VATS (limited  by dense fibrosis) w/ R lower paratracheal LN bx 5/29/17 w/ Dr. Molina. Path suspicious for fungal infection. AFB neg.  Path - Necrotizing granulomatous inflammation with rare GMS-positive forms highly suspicious for yeast. No microorganisms on PAS or AFB.  Labs reviewed - neg histo urine. AFB neg. Bartonella henselae IgG 1:256. HIV neg.  Saw Dr. Lynn again on 6/25/17 - started on itraconazole.  Per telephone encounter 7/8/17 - sporanox x 3 mo. Prednisone 20 till CT.  CT chest 6/20/17 - regressed LN.  CT chest 12/19/17 - stable R paratracheal LN 0.9cm. Subcarinal and R hilar contours unremarkable. No new disease.   CT chest 3/17/18 - previously ID paratracheal LN not visualized on exam today. Beam hardening artifact from SVC. Grossly stable R hilar LAD.  Due to dysphagia, EGD 3/19/18 - short segment extrinsic narrowing of upper 1/3 of esophagus. Empiric dilation of lower 1/3 of esophagus. Neg for eosinophils  PET/CT 5/8/18 - redemonstration of hypermetabolic LAD on R supraclavicular, subcarinal, R hilar locations w/ decreased SUV max compared to prior exam. New hypermetabolism in post oropharynx and tonsils, likely inflammatory and reactive to infection.   CT Chest/AP 5/28/18 - redemonstration of mediastinal and right hilar lymphadenopathy with index measurements as above.  Left upper lobe nodule measuring 0.3 cm stable since prior dated 3/17/18.  Heterogeneity of the liver parenchyma with continue areas of increased enhancement.    Sunburn in the legs due to kayaking. Hurts. Redness is improving. Using aloe vera.     Still very active and exercising 5 days a week.     Since last visit, pt's mom was dx w/ breast CA at 48 and BRCA1 positive. Pt and sister tested and both neg.     Past Medical History:  Past Medical History:   Diagnosis Date    B12 deficiency     Bartonella infection     Iron deficiency     Mastocytosis     Mediastinal adenopathy \    Obsessive compulsive disorder     followed by   [facility]       Past Surgical History:  Past Surgical History:   Procedure Laterality Date    BRONCHOSCOPY      LUNG BIOPSY  03/2017    mediastinoscopy  05/2017       Allergies:  Review of patient's allergies indicates:   Allergen Reactions    No known drug allergies        Home Medications:  Prior to Admission medications    Medication Sig Start Date End Date Taking? Authorizing Provider   ACZONE 5 % topical gel Apply topically every morning. 5/11/15   Karen Ventura MD   fluoxetine 10 MG Tab Take 1 tablet (10 mg total) by mouth once daily.  Patient taking differently: Take 20 mg by mouth once daily.  3/9/17 3/19/18  Brooklynn Boyle MD   norethindrone-ethinyl estradiol (OVCON) 0.4-35 mg-mcg per tablet Take 1 tablet by mouth once daily. 2/19/18 2/19/19  Migdalia Vitale MD   predniSONE (DELTASONE) 20 MG tablet Two tablets once a day x 7 days; then one tab a day thereafter. 3/23/18   Camila Lynn MD   tazarotene (TAZORAC) 0.05 % Crea cream Apply topically every evening. Apply thin film to face qhs after moisturizing. 5/11/15   Karen Ventura MD       Family History:  Family History   Problem Relation Age of Onset    Hyperlipidemia Mother     Melanoma Mother     Allergies Mother     Cancer Mother 49        breast CA - BRCA 1    Other Father         ulcerative colitis    Allergies Brother     Depression Brother     Eczema Brother     Obesity Brother     Mental illness Sister         OCD, PANDAS    Psoriasis Neg Hx     Lupus Neg Hx     Anesthesia problems Neg Hx        Social History:  Social History   Substance Use Topics    Smoking status: Never Smoker    Smokeless tobacco: Never Used    Alcohol use Yes      Comment: occas,        Review of Systems:  Review of Systems   Constitutional: Negative for activity change and unexpected weight change.   HENT: Negative for hearing loss, rhinorrhea and trouble swallowing.    Eyes: Negative for discharge and visual disturbance.   Respiratory: Negative  "for chest tightness and wheezing.    Cardiovascular: Negative for chest pain and palpitations.   Gastrointestinal: Negative for blood in stool, constipation, diarrhea and vomiting.   Endocrine: Negative for polydipsia and polyuria.   Genitourinary: Negative for difficulty urinating, dysuria, hematuria and menstrual problem.   Musculoskeletal: Negative for arthralgias, joint swelling and neck pain.   Neurological: Negative for weakness and headaches.   Psychiatric/Behavioral: Negative for confusion and dysphoric mood.       Health Maintainence:   Reviewed. Due for Tdap.     PHYSICAL EXAM     /72 (BP Location: Left arm, Patient Position: Sitting, BP Method: Small (Manual))   Pulse 76   Temp 98.3 °F (36.8 °C)   Ht 5' 3" (1.6 m)   Wt 56.2 kg (123 lb 14.4 oz)   BMI 21.95 kg/m²     GEN - A+OX4, NAD   HEENT - PERRL, EOMI, OP clear. MMM.   Neck - No thyromegaly or cervical LAD. No thyroid masses felt.  CV - RRR, no m/r   Chest - CTAB, no wheezing or rhonchi  Abd - S/NT/ND/+BS.   Ext - 2+BDP and radial pulses. No LE edema.   Neuro - PERRL, EOMI, no nystagmus, eyebrow raise, facial sensation, hearing, m of mastication, smile, palatal raise, shoulder shrug, tongue protrusion symmetric and intact. 5/5 BUE and BLE strength. Sensation to light touch intact throughout. 2+ DTRs. Normal gait.   MSK - No spinal tenderness to palpation. Normal gait.   Skin - sunburn of BLE w/ small blisters at the LLE.     LABS     Previous labs reviewed.    ASSESSMENT/PLAN     Madeline Collette Evans is a 23 y.o. female with  Diagnoses and all orders for this visit:    Annual physical exam  -     Rubella antibody, IgG; Future; Expected date: 06/01/2018  -     Rubeola antibody IgG; Future; Expected date: 06/01/2018  -     Mumps, IgG Screen; Future; Expected date: 06/01/2018  -     Hepatitis B Surface Antibody, Qual/Quant; Future; Expected date: 06/01/2018  -     Varicella zoster antibody, IgG; Future; Expected date: 06/01/2018  -     POCT " TB Skin Test Read  -     CBC auto differential; Future; Expected date: 06/01/2018  -     Comprehensive metabolic panel; Future; Expected date: 06/01/2018  -     Lipid panel; Future; Expected date: 06/01/2018  -     TSH; Future; Expected date: 06/01/2018    Need for Tdap vaccination - tdap via vaccine clinic.    B12 deficiency  -     Vitamin B12 Deficiency Panel; Future; Expected date: 06/01/2018    Iron deficiency  -     Ferritin; Future; Expected date: 06/01/2018  -     Iron and TIBC; Future; Expected date: 06/01/2018    OCD - on prozac 20mg daily. Doing well.   -     FLUoxetine (PROZAC) 20 MG capsule; Take 20 mg by mouth once daily.    Once titers return, will fill out school form.       RTC in 12 months, sooner if needed and depending on labs.    Brooklynn Boyle MD  Department of Internal Medicine - [facility]  6:56 AM

## 2018-06-02 LAB — VIT B12 SERPL-MCNC: 203 NG/L

## 2018-06-04 ENCOUNTER — CLINICAL SUPPORT (OUTPATIENT)
Dept: INTERNAL MEDICINE | Facility: CLINIC | Age: 23
End: 2018-06-04
Payer: COMMERCIAL

## 2018-06-04 ENCOUNTER — PATIENT MESSAGE (OUTPATIENT)
Dept: INFECTIOUS DISEASES | Facility: CLINIC | Age: 23
End: 2018-06-04

## 2018-06-04 LAB
HEP. B SURF AB, QUAL: NEGATIVE
HEP. B SURF AB, QUANT.: 5 MIU/ML
MUMPS IGG INTERPRETATION: POSITIVE
MUMPS IGG SCREEN: 2.54 ISR
RUBEOLA IGG ANTIBODY: 1.23 ISR
RUBEOLA INTERPRETATION: POSITIVE
RUBV IGG SER-ACNC: 13.5 IU/ML
RUBV IGG SER-IMP: REACTIVE
VARICELLA INTERPRETATION: POSITIVE
VARICELLA ZOSTER IGG: 1.55 ISR

## 2018-06-05 ENCOUNTER — TELEPHONE (OUTPATIENT)
Dept: INTERNAL MEDICINE | Facility: CLINIC | Age: 23
End: 2018-06-05

## 2018-06-05 ENCOUNTER — PATIENT MESSAGE (OUTPATIENT)
Dept: INTERNAL MEDICINE | Facility: CLINIC | Age: 23
End: 2018-06-05

## 2018-06-05 DIAGNOSIS — Z23 NEED FOR HEPATITIS B VACCINATION: Primary | ICD-10-CM

## 2018-06-05 LAB — METHYLMALONATE SERPL-SCNC: 0.16 NMOL/ML

## 2018-06-05 NOTE — TELEPHONE ENCOUNTER
Please call and let pt know:    Blood counts, liver, kidney and thyroid functions are normal. Cholesterol looks great! B12, iron levels are ok. She is immune against MMR, varicella but even though she had hep B vaccines when she was younger, she's not showing immunity against it. Therefore, I'll order the hep B vaccine and she'll have to get it through either student health at \[facility]\"" or we can set her up w/ ID injection clinic. The forms are filled out. Please see if she wants to come and pick them up.

## 2018-06-06 ENCOUNTER — CLINICAL SUPPORT (OUTPATIENT)
Dept: INFECTIOUS DISEASES | Facility: CLINIC | Age: 23
End: 2018-06-06
Payer: COMMERCIAL

## 2018-06-06 DIAGNOSIS — Z23 NEED FOR HEPATITIS B VACCINATION: ICD-10-CM

## 2018-06-06 PROCEDURE — 90746 HEPB VACCINE 3 DOSE ADULT IM: CPT | Mod: S$GLB,,, | Performed by: INTERNAL MEDICINE

## 2018-06-06 PROCEDURE — 90471 IMMUNIZATION ADMIN: CPT | Mod: S$GLB,,, | Performed by: INTERNAL MEDICINE

## 2018-06-06 NOTE — PROGRESS NOTES
Pt received the Hepatitis B vaccination. Pt tolerated the injection well. Pt will reach out to Dr. Boyle's office to see if this was a one time booster or if she will need to repeat the entire series. There are currently no additional orders. Pt left the unit in NAD. Immunization report printed per pt request.

## 2018-06-06 NOTE — TELEPHONE ENCOUNTER
Gave pt a call let her know her paperwork is ready to be picked up, she asked about the HepB shot series, I scheduled her today at 2:15 to start the series.

## 2018-06-13 ENCOUNTER — OFFICE VISIT (OUTPATIENT)
Dept: INFECTIOUS DISEASES | Facility: CLINIC | Age: 23
End: 2018-06-13
Payer: COMMERCIAL

## 2018-06-13 VITALS
BODY MASS INDEX: 21.64 KG/M2 | SYSTOLIC BLOOD PRESSURE: 113 MMHG | DIASTOLIC BLOOD PRESSURE: 77 MMHG | HEIGHT: 63 IN | TEMPERATURE: 98 F | WEIGHT: 122.13 LBS | HEART RATE: 85 BPM

## 2018-06-13 DIAGNOSIS — R59.0 MEDIASTINAL ADENOPATHY: Primary | ICD-10-CM

## 2018-06-13 PROCEDURE — 99214 OFFICE O/P EST MOD 30 MIN: CPT | Mod: S$GLB,,, | Performed by: INTERNAL MEDICINE

## 2018-06-13 PROCEDURE — 99999 PR PBB SHADOW E&M-EST. PATIENT-LVL III: CPT | Mod: PBBFAC,,, | Performed by: INTERNAL MEDICINE

## 2018-06-13 PROCEDURE — 3008F BODY MASS INDEX DOCD: CPT | Mod: CPTII,S$GLB,, | Performed by: INTERNAL MEDICINE

## 2018-06-13 NOTE — PROGRESS NOTES
Subjective:      Patient ID: Madeline Collette Evans is a 23 y.o. female.    Chief Complaint:Follow-up      History of Present Illness    23 year old with drenching night sweats dating back to late 11/2016; around Thanksgiving.  Had URI symptoms around early February and was given a Z-pack/no steroids.  3/9/2017: chest pain on deep breathing; subjective fever. Given levaquin Rx.    pression - chest x-ray 3/9   Asymmetrical chronic granulomatous change right hilum.  Old chest radiographs for comparison, consideration for repeat chest exam post therapy versus CT scan of chest with IV contrast as clinically indicated, suggested as well as clinical correlation.     Impression - CT scan 3/9      1.  Large right hilar and subcarinal necrotic lymph node enlargement with associated mild consolidation with air bronchogram in the right middle lobe as described above.  Given the patient's age, this is favored to be related to infectious process with reactive lymph node enlargement.  However, given the large size of subcarinal lymph node, a neoplastic process cannot be excluded.  Correlation and followup to resolution are advised.  Further evaluation as clinically indicated.    2.  Vague area of hyper enhancement in segment 7 of the liver which could relate to FNH or adenoma.  Further evaluation with liver mass protocol MRI with Eovist is recommended.    3.  Nonspecific splenic lesions which could be further characterized the time of MRI as well.     4.  Narrowing of the bronchus intermedius from mass effect of subcarinal lymph node as above.       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 No pets.    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    Component      Latest Ref Rng & Units 3/16/2017 3/9/2017  7/20/2016   Hemoglobin      12.0 - 16.0 g/dL 11.9 (L) 11.1 (L) 13.0   Hematocrit      37.0 - 48.5 % 36.5 (L) 34.2 (L) 40.9     Component      Latest Ref Rng & Units 3/16/2017 3/9/2017 7/20/2016   Gran%      38.0 - 73.0 % 79.8 (H) 76.8 (H) 69.7   Lymph%      18.0 - 48.0 % 12.6 (L) 14.6 (L) 23.4     Component      Latest Ref Rng & Units 3/16/2017   Iron      30 - 160 ug/dL 33   Transferrin      200 - 375 mg/dL 365   TIBC      250 - 450 ug/dL 540 (H)   Saturated Iron      20 - 50 % 6 (L)     Component      Latest Ref Rng & Units 3/16/2017   Vitamin B-12      210 - 950 pg/mL 165 (L)     Component      Latest Ref Rng & Units 3/17/2017   EBV VCA IgG      <1:10 Titer 1:160 (A)   EBV VCA IgM      <1:10 Titer <1:10   EBV Early Antigen Ab, IgG      <1:10 Titer 1:10 (A)   EBV Nuclear Ag Ab      <1:5 Titer 1:20 (A)     Path:    Was started on steroids and felt somewhat better and breathing improved.  This was discontinued and follow up by MRI which revealed marked improvement - 12/29/2017.    On march of 2018, she presented with dysphagia and a Ct revealed a new mass.  Underwent EGD and followed by biopsy which was non-diagnostic.    All symptoms have resolved without intervention.        Review of Systems   Constitution: Negative for chills, decreased appetite, fever, weakness, malaise/fatigue, night sweats, weight gain and weight loss.   HENT: Negative for congestion, ear pain, hearing loss, hoarse voice, sore throat and tinnitus.    Eyes: Negative for blurred vision, redness and visual disturbance.   Cardiovascular: Negative for chest pain, leg swelling and palpitations.   Respiratory: Negative for cough, hemoptysis, shortness of breath and sputum production.    Hematologic/Lymphatic: Negative for adenopathy. Does not bruise/bleed easily.   Skin: Negative for dry skin, itching, rash and suspicious lesions.   Musculoskeletal: Negative for back pain, joint pain, myalgias and neck pain.   Gastrointestinal: Negative for  abdominal pain, constipation, diarrhea, heartburn, nausea and vomiting.   Genitourinary: Negative for dysuria, flank pain, frequency, hematuria, hesitancy and urgency.   Neurological: Negative for dizziness, headaches, numbness and paresthesias.   Psychiatric/Behavioral: Negative for depression and memory loss. The patient does not have insomnia and is not nervous/anxious.      Objective:   Physical Exam   HENT:   Head: Normocephalic.   Pulmonary/Chest: Effort normal.   Vitals reviewed.  no adenopathy palpated.   Assessment:       1. Mediastinal adenopathy          Plan:       1. Labs ordered.  2. Will follow up with provider at [facility] and will send studies prior to visit.

## 2018-07-12 ENCOUNTER — PATIENT MESSAGE (OUTPATIENT)
Dept: INTERNAL MEDICINE | Facility: CLINIC | Age: 23
End: 2018-07-12

## 2018-07-12 DIAGNOSIS — Z23 NEED FOR HEPATITIS B VACCINATION: Primary | ICD-10-CM

## 2018-07-16 ENCOUNTER — TELEPHONE (OUTPATIENT)
Dept: OBSTETRICS AND GYNECOLOGY | Facility: CLINIC | Age: 23
End: 2018-07-16

## 2018-07-16 NOTE — TELEPHONE ENCOUNTER
----- Message from Sophie Clemens sent at 7/16/2018  3:29 PM CDT -----  Contact: self  Pt called to schedule appointment due to changes (pt was not specific). Pt would like a callback.          Pt callback number [phone]

## 2018-07-18 ENCOUNTER — OFFICE VISIT (OUTPATIENT)
Dept: OBSTETRICS AND GYNECOLOGY | Facility: CLINIC | Age: 23
End: 2018-07-18
Payer: COMMERCIAL

## 2018-07-18 ENCOUNTER — TELEPHONE (OUTPATIENT)
Dept: OBSTETRICS AND GYNECOLOGY | Facility: CLINIC | Age: 23
End: 2018-07-18

## 2018-07-18 ENCOUNTER — PATIENT MESSAGE (OUTPATIENT)
Dept: OBSTETRICS AND GYNECOLOGY | Facility: CLINIC | Age: 23
End: 2018-07-18

## 2018-07-18 VITALS
BODY MASS INDEX: 21.79 KG/M2 | DIASTOLIC BLOOD PRESSURE: 62 MMHG | HEIGHT: 63 IN | SYSTOLIC BLOOD PRESSURE: 100 MMHG | WEIGHT: 123 LBS

## 2018-07-18 DIAGNOSIS — N76.0 ACUTE VAGINITIS: Primary | ICD-10-CM

## 2018-07-18 DIAGNOSIS — N76.0 BV (BACTERIAL VAGINOSIS): Primary | ICD-10-CM

## 2018-07-18 DIAGNOSIS — B96.89 BV (BACTERIAL VAGINOSIS): Primary | ICD-10-CM

## 2018-07-18 LAB
CANDIDA RRNA VAG QL PROBE: NEGATIVE
G VAGINALIS RRNA GENITAL QL PROBE: POSITIVE
T VAGINALIS RRNA GENITAL QL PROBE: NEGATIVE

## 2018-07-18 PROCEDURE — 87480 CANDIDA DNA DIR PROBE: CPT

## 2018-07-18 PROCEDURE — 3008F BODY MASS INDEX DOCD: CPT | Mod: CPTII,S$GLB,, | Performed by: OBSTETRICS & GYNECOLOGY

## 2018-07-18 PROCEDURE — 99999 PR PBB SHADOW E&M-EST. PATIENT-LVL III: CPT | Mod: PBBFAC,,,

## 2018-07-18 PROCEDURE — 87510 GARDNER VAG DNA DIR PROBE: CPT

## 2018-07-18 PROCEDURE — 99213 OFFICE O/P EST LOW 20 MIN: CPT | Mod: S$GLB,,, | Performed by: OBSTETRICS & GYNECOLOGY

## 2018-07-18 RX ORDER — METRONIDAZOLE 500 MG/1
500 TABLET ORAL 2 TIMES DAILY
Qty: 14 TABLET | Refills: 0 | Status: SHIPPED | OUTPATIENT
Start: 2018-07-18 | End: 2018-07-25

## 2018-07-18 NOTE — TELEPHONE ENCOUNTER
----- Message from Haydee Claros sent at 7/18/2018  9:03 AM CDT -----  Contact: self  Patient is returning a missed a call and can be reached at [phone].

## 2018-07-18 NOTE — TELEPHONE ENCOUNTER
Pt c/o vaginal discharge with a odor. Advised pt needs to come in to be evaluated. Gave pt appointment today in the women's walk in clinic

## 2018-07-18 NOTE — TELEPHONE ENCOUNTER
----- Message from Megan Zimmer sent at 7/18/2018  8:05 AM CDT -----  Contact: pt            Name of Who is Calling: EVANS,MADELINE COLLETTE [[id number]]    What is the request in detail: pt returning call.. Please advise      Can the clinic reply by [facility]: no    [facility] [phone]

## 2018-07-18 NOTE — PROGRESS NOTES
Madeline Collette Evans is a 23 y.o. female No obstetric history on file. [facility] with complaint of increased  vaginal discharge for 2-3 days.  She denies itching, and reports odor.  She states the discharge is white.      Pt sees Dr. pritchard for her OB/GYN care.    ROS:  GENERAL: No fever, chills, fatigability or weight loss.  VULVAR: No pain, no lesions and no itching.  VAGINAL: No relaxation, no abnormal bleeding and no lesions. Reports increased discharge, slight odor  ABDOMEN: No abdominal pain. Denies nausea. Denies vomiting. No diarrhea. No constipation  BREAST: Denies pain. No lumps. No discharge.  URINARY: No incontinence, no nocturia, no frequency and no dysuria. Slight burning with urination  CARDIOVASCULAR: No chest pain. No shortness of breath. No leg cramps.  NEUROLOGICAL: No headaches. No vision changes.      Review of patient's allergies indicates:   Allergen Reactions    No known drug allergies        Current Outpatient Prescriptions:     ACZONE 5 % topical gel, Apply topically every morning., Disp: 60 g, Rfl: 3    diphth,pertus,acell,,tetanus (BOOSTRIX TDAP) 2.5-8-5 Lf-mcg-Lf/0.5mL Syrg injection, Inject into the muscle., Disp: 0.5 mL, Rfl: 0    FLUoxetine (PROZAC) 20 MG capsule, Take 20 mg by mouth once daily., Disp: , Rfl: 1    norethindrone-ethinyl estradiol (OVCON) 0.4-35 mg-mcg per tablet, Take 1 tablet by mouth once daily., Disp: 30 tablet, Rfl: 11    tazarotene (TAZORAC) 0.05 % Crea cream, Apply topically every evening. Apply thin film to face qhs after moisturizing., Disp: 60 g, Rfl: 1    Past Medical History:   Diagnosis Date    B12 deficiency     Bartonella infection     Iron deficiency     Mastocytosis     Mediastinal adenopathy \    Obsessive compulsive disorder     followed by Dr. Malone     Past Surgical History:   Procedure Laterality Date    BRONCHOSCOPY      LUNG BIOPSY  03/2017    mediastinoscopy  05/2017     Social History   Substance Use  "Topics    Smoking status: Never Smoker    Smokeless tobacco: Never Used    Alcohol use Yes      Comment: occas,      OB History   No data available       /62   Ht 5' 3" (1.6 m)   Wt 55.8 kg (123 lb)   LMP 07/01/2018   Breastfeeding? No   BMI 21.79 kg/m²     PHYSICAL EXAM:  GENERAL: Calm and appropriate affect, alert, oriented x4  SKIN: Color appropriate for race, warm and dry, clean and intact with no rashes.  RESP: Even, unlabored breathing  ABDOMEN: Soft, nontender, no masses.  :   Normal external female genitalia without lesions. Normal hair distribution. Adequate perineal body, normal urethral meatus.  Vagina pink and well rugated, no lesions, vaginal discharge - white, creamy, thick with no foul odor.   No significant cystocele or rectocele.  Cervix - no cervical motion tenderness.        ASSESSMENT / PLAN:    ICD-10-CM ICD-9-CM    1. Acute vaginitis N76.0 616.10 Vaginosis Screen by DNA Probe     Acute vaginitis  -     Vaginosis Screen by DNA Probe          Patient was counseled today on vaginitis prevention including :  a. avoiding feminine products such as deoderant soaps, body wash, bubble bath, douches, scented toilet paper, deoderant tampons or pads, feminine wipes, chronic pad use, etc.  b. avoiding other vulvovaginal irritants such as long hot baths, humidity, tight, synthetic clothing, chlorine and sitting around in wet bathing suits  c. wearing cotton underwear, avoiding thong underwear and no underwear to bed  d. taking showers instead of baths and use a hair dryer on cool setting afterwards to dry  e. wearing cotton to exercise and shower immediately after exercise and change clothes  f. using polyurethane condoms without spermicide if sexually active and symptoms are triggered by intercourse  g. Discussed use of vagisil, along with repHresh and probiotics    FOLLOW UP:   Pending lab results, PRN lack of improvement.  "

## 2018-07-19 ENCOUNTER — CLINICAL SUPPORT (OUTPATIENT)
Dept: INFECTIOUS DISEASES | Facility: CLINIC | Age: 23
End: 2018-07-19
Payer: COMMERCIAL

## 2018-07-19 PROCEDURE — 90746 HEPB VACCINE 3 DOSE ADULT IM: CPT | Mod: S$GLB,,, | Performed by: INTERNAL MEDICINE

## 2018-07-19 PROCEDURE — 90471 IMMUNIZATION ADMIN: CPT | Mod: S$GLB,,, | Performed by: INTERNAL MEDICINE

## 2018-07-19 NOTE — PROGRESS NOTES
Pt received the second dose of her Hepatitis B vaccination. Pt tolerated the injection well. Immunization report printed per pt request. Pt plans to get the third dose of her vaccination at school. Pt left the unit in NAD.

## 2018-10-30 ENCOUNTER — PATIENT MESSAGE (OUTPATIENT)
Dept: INTERNAL MEDICINE | Facility: CLINIC | Age: 23
End: 2018-10-30

## 2018-10-31 ENCOUNTER — IMMUNIZATION (OUTPATIENT)
Dept: INTERNAL MEDICINE | Facility: CLINIC | Age: 23
End: 2018-10-31
Payer: COMMERCIAL

## 2018-10-31 PROCEDURE — 90686 IIV4 VACC NO PRSV 0.5 ML IM: CPT | Mod: S$GLB,,, | Performed by: INTERNAL MEDICINE

## 2018-10-31 PROCEDURE — 90471 IMMUNIZATION ADMIN: CPT | Mod: S$GLB,,, | Performed by: INTERNAL MEDICINE

## 2018-11-07 ENCOUNTER — OFFICE VISIT (OUTPATIENT)
Dept: OPTOMETRY | Facility: CLINIC | Age: 23
End: 2018-11-07
Payer: COMMERCIAL

## 2018-11-07 DIAGNOSIS — H52.13 MYOPIA OF BOTH EYES: Primary | ICD-10-CM

## 2018-11-07 PROCEDURE — 92015 DETERMINE REFRACTIVE STATE: CPT | Mod: S$GLB,,, | Performed by: OPTOMETRIST

## 2018-11-07 PROCEDURE — 99999 PR PBB SHADOW E&M-EST. PATIENT-LVL II: CPT | Mod: PBBFAC,,, | Performed by: OPTOMETRIST

## 2018-11-07 PROCEDURE — 92004 COMPRE OPH EXAM NEW PT 1/>: CPT | Mod: S$GLB,,, | Performed by: OPTOMETRIST

## 2018-11-07 RX ORDER — MULTIVITAMIN
1 TABLET ORAL DAILY
COMMUNITY

## 2018-11-07 NOTE — PROGRESS NOTES
HPI     Pt states her last eye exam was about 10 years ago. Pt was on steroids by   mouth for 6 months and felt like her vision was decreasing. Pt denies any   floaters, flashes or diplopia.    Meds; No GTTS    Last edited by Marybeth Pinto on 11/7/2018  2:50 PM. (History)            Assessment /Plan     For exam results, see Encounter Report.    Myopia of both eyes  Eyeglass Final Rx     Eyeglass Final Rx       Sphere Cylinder Dist VA    Right -0.50 Sphere 20/15    Left -0.50 Sphere 20/15                  RTC 1 yr

## 2018-12-04 ENCOUNTER — PATIENT MESSAGE (OUTPATIENT)
Dept: INTERNAL MEDICINE | Facility: CLINIC | Age: 23
End: 2018-12-04

## 2018-12-12 ENCOUNTER — PATIENT MESSAGE (OUTPATIENT)
Dept: INFECTIOUS DISEASES | Facility: CLINIC | Age: 23
End: 2018-12-12

## 2018-12-12 ENCOUNTER — CLINICAL SUPPORT (OUTPATIENT)
Dept: INFECTIOUS DISEASES | Facility: CLINIC | Age: 23
End: 2018-12-12
Payer: COMMERCIAL

## 2018-12-12 PROCEDURE — 90471 IMMUNIZATION ADMIN: CPT | Mod: S$GLB,,, | Performed by: INTERNAL MEDICINE

## 2018-12-12 PROCEDURE — 90746 HEPB VACCINE 3 DOSE ADULT IM: CPT | Mod: S$GLB,,, | Performed by: INTERNAL MEDICINE

## 2018-12-12 NOTE — PROGRESS NOTES
Patient arrives for immunization injection of  Hep B  - administered per order -left in no apparent distress.

## 2018-12-13 ENCOUNTER — TELEPHONE (OUTPATIENT)
Dept: INFECTIOUS DISEASES | Facility: CLINIC | Age: 23
End: 2018-12-13

## 2018-12-13 DIAGNOSIS — R59.0 MEDIASTINAL LYMPHADENOPATHY: Primary | ICD-10-CM

## 2018-12-21 ENCOUNTER — HOSPITAL ENCOUNTER (OUTPATIENT)
Dept: RADIOLOGY | Facility: HOSPITAL | Age: 23
Discharge: HOME | End: 2018-12-21
Attending: INTERNAL MEDICINE
Payer: COMMERCIAL

## 2018-12-21 DIAGNOSIS — R59.0 MEDIASTINAL LYMPHADENOPATHY: ICD-10-CM

## 2018-12-21 PROCEDURE — 71260 CT THORAX DX C+: CPT | Mod: 26,,, | Performed by: RADIOLOGY

## 2018-12-21 PROCEDURE — 71260 CT THORAX DX C+: CPT | Mod: TC

## 2018-12-21 PROCEDURE — 25500020 PHARM REV CODE 255: Performed by: INTERNAL MEDICINE

## 2018-12-21 RX ADMIN — IOHEXOL 75 ML: 350 INJECTION, SOLUTION INTRAVENOUS at 02:12

## 2019-04-10 ENCOUNTER — OFFICE VISIT (OUTPATIENT)
Dept: DERMATOLOGY | Facility: CLINIC | Age: 24
End: 2019-04-10
Payer: COMMERCIAL

## 2019-04-10 DIAGNOSIS — L70.0 ACNE VULGARIS: Primary | ICD-10-CM

## 2019-04-10 DIAGNOSIS — D18.00 ANGIOMA: ICD-10-CM

## 2019-04-10 DIAGNOSIS — I78.1 SPIDER VEINS: ICD-10-CM

## 2019-04-10 PROCEDURE — 99999 PR PBB SHADOW E&M-EST. PATIENT-LVL II: ICD-10-PCS | Mod: PBBFAC,,, | Performed by: DERMATOLOGY

## 2019-04-10 PROCEDURE — 99213 OFFICE O/P EST LOW 20 MIN: CPT | Mod: S$GLB,,, | Performed by: DERMATOLOGY

## 2019-04-10 PROCEDURE — 99999 PR PBB SHADOW E&M-EST. PATIENT-LVL II: CPT | Mod: PBBFAC,,, | Performed by: DERMATOLOGY

## 2019-04-10 PROCEDURE — 99213 PR OFFICE/OUTPT VISIT, EST, LEVL III, 20-29 MIN: ICD-10-PCS | Mod: S$GLB,,, | Performed by: DERMATOLOGY

## 2019-04-10 NOTE — PROGRESS NOTES
Subjective:       Patient ID:  Madeline Collette Evans is a 23 y.o. female who presents for   Chief Complaint   Patient presents with    Acne     Pt c/o acne on face x many years. tx with aczone and tazorac cream. Tx helps. Also on OCP which helps the acne.   Pt c/o spider veins on legs and shoulders x a few months. No prev  Tx. Feels they started after the OCP.  None are painful.   Also has red lesion on her face.  Worse with heat .  No tx.   Does exercise frequently.        Review of Systems   HENT: Negative for nosebleeds and headaches.    Gastrointestinal: Negative for diarrhea and Sensitivity to oral antibiotics.   Genitourinary: Negative for irregular periods.   Musculoskeletal: Negative for arthralgias.   Skin: Positive for daily sunscreen use and activity-related sunscreen use. Negative for recent sunburn.   Neurological: Negative for headaches.   Psychiatric/Behavioral: Negative for depressed mood.        Objective:    Physical Exam   Constitutional: She appears well-developed and well-nourished. No distress.   Neurological: She is alert and oriented to person, place, and time. She is not disoriented.   Psychiatric: She has a normal mood and affect.   Skin:   Areas Examined (abnormalities noted in diagram):   Head / Face Inspection Performed  Neck Inspection Performed  RLE Inspected  LLE Inspection Performed                   Diagram Legend     Erythematous scaling macule/papule c/w actinic keratosis       Vascular papule c/w angioma      Pigmented verrucoid papule/plaque c/w seborrheic keratosis      Yellow umbilicated papule c/w sebaceous hyperplasia      Irregularly shaped tan macule c/w lentigo     1-2 mm smooth white papules consistent with Milia      Movable subcutaneous cyst with punctum c/w epidermal inclusion cyst      Subcutaneous movable cyst c/w pilar cyst      Firm pink to brown papule c/w dermatofibroma      Pedunculated fleshy papule(s) c/w skin tag(s)      Evenly pigmented macule c/w  junctional nevus     Mildly variegated pigmented, slightly irregular-bordered macule c/w mildly atypical nevus      Flesh colored to evenly pigmented papule c/w intradermal nevus       Pink pearly papule/plaque c/w basal cell carcinoma      Erythematous hyperkeratotic cursted plaque c/w SCC      Surgical scar with no sign of skin cancer recurrence      Open and closed comedones      Inflammatory papules and pustules      Verrucoid papule consistent consistent with wart     Erythematous eczematous patches and plaques     Dystrophic onycholytic nail with subungual debris c/w onychomycosis     Umbilicated papule    Erythematous-base heme-crusted tan verrucoid plaque consistent with inflamed seborrheic keratosis     Erythematous Silvery Scaling Plaque c/w Psoriasis     See annotation      Assessment / Plan:        Acne vulgaris  -     tazarotene (TAZORAC) 0.05 % Crea cream; Apply topically every evening. Apply thin film to face qhs then moisturize  Dispense: 60 g; Refill: 1  aczone gel qhs   To her knowledge, patient is not currently pregnant. Patient understands that she must discontinue all acne medications if she becomes pregnant.  Continue ocp   Sunscreen qam   Vit c lotion     Spider veins  Refer to dr merino for laser or sclerotherapy    Angioma  These are benign vascular lesions that are inherited.  Treatment is not necessary.  Refer to dr merino            Follow up if symptoms worsen or fail to improve.

## 2019-04-12 RX ORDER — FLUOXETINE HYDROCHLORIDE 20 MG/1
20 CAPSULE ORAL DAILY
Qty: 30 CAPSULE | Refills: 2 | Status: SHIPPED | OUTPATIENT
Start: 2019-04-12 | End: 2020-05-29 | Stop reason: SDUPTHER

## 2019-04-12 NOTE — TELEPHONE ENCOUNTER
----- Message from Roddy Benítez sent at 4/12/2019  3:30 PM CDT -----  Contact: Patient [phone]  RX request - refill or new RX.  Is this a refill or new RX: Refill   RX name and strength:  FLUoxetine (PROZAC) 20 MG capsule     Pharmacy name and phone # [id number] - [place], [place] - [address] [facility] [phone] (Phone) [phone] (Fax)    Please call an advise  Thank you

## 2019-04-15 NOTE — TELEPHONE ENCOUNTER
----- Message from Brooklynn Macias sent at 4/15/2019  1:57 PM CDT -----  Contact: EVANS,MADELINE COLLETTE [[id number]]                                                   MEDICATION  REFILL  REQUEST      Please refill the medication(s) listed below. Please call the patient when the prescription(s) is ready for  at this phone number   EVANS,MADELINE COLLETTE / # [phone]        Medication #1  norethindrone-ethinyl estradiol (OVCON) 0.4-35 mg-mcg per tablet      Preferred Pharmacy:   [facility][id number] - [place] - [address] AT [facility]     [phone] (Phone)  [phone] (Fax)

## 2019-04-16 ENCOUNTER — TELEPHONE (OUTPATIENT)
Dept: OBSTETRICS AND GYNECOLOGY | Facility: CLINIC | Age: 24
End: 2019-04-16

## 2019-05-06 ENCOUNTER — PATIENT MESSAGE (OUTPATIENT)
Dept: INTERNAL MEDICINE | Facility: CLINIC | Age: 24
End: 2019-05-06

## 2019-05-06 DIAGNOSIS — Z11.1 TUBERCULOSIS SCREENING: Primary | ICD-10-CM

## 2019-05-07 ENCOUNTER — PATIENT MESSAGE (OUTPATIENT)
Dept: INTERNAL MEDICINE | Facility: CLINIC | Age: 24
End: 2019-05-07

## 2019-05-08 ENCOUNTER — OFFICE VISIT (OUTPATIENT)
Dept: OBSTETRICS AND GYNECOLOGY | Facility: CLINIC | Age: 24
End: 2019-05-08
Attending: OBSTETRICS & GYNECOLOGY
Payer: COMMERCIAL

## 2019-05-08 VITALS
RESPIRATION RATE: 18 BRPM | WEIGHT: 126.75 LBS | SYSTOLIC BLOOD PRESSURE: 110 MMHG | BODY MASS INDEX: 22.46 KG/M2 | DIASTOLIC BLOOD PRESSURE: 62 MMHG

## 2019-05-08 DIAGNOSIS — Z12.4 SCREENING FOR MALIGNANT NEOPLASM OF CERVIX: Primary | ICD-10-CM

## 2019-05-08 DIAGNOSIS — Z01.419 ENCOUNTER FOR GYNECOLOGICAL EXAMINATION WITHOUT ABNORMAL FINDING: ICD-10-CM

## 2019-05-08 PROCEDURE — 99395 PREV VISIT EST AGE 18-39: CPT | Mod: S$GLB,,, | Performed by: OBSTETRICS & GYNECOLOGY

## 2019-05-08 PROCEDURE — 88175 CYTOPATH C/V AUTO FLUID REDO: CPT | Performed by: PATHOLOGY

## 2019-05-08 PROCEDURE — 99395 PR PREVENTIVE VISIT,EST,18-39: ICD-10-PCS | Mod: S$GLB,,, | Performed by: OBSTETRICS & GYNECOLOGY

## 2019-05-08 PROCEDURE — 88141 CYTOPATH C/V INTERPRET: CPT | Mod: ,,, | Performed by: PATHOLOGY

## 2019-05-08 PROCEDURE — 88141 LIQUID-BASED PAP SMEAR, SCREENING: ICD-10-PCS | Mod: ,,, | Performed by: PATHOLOGY

## 2019-05-08 NOTE — PROGRESS NOTES
SUBJECTIVE:   23 y.o. female No obstetric history on file.  for annual routine Pap and checkup. Patient's last menstrual period was 05/07/2019..  She has no unusual complaints.        Past Medical History:   Diagnosis Date    Abnormal Pap smear of cervix     B12 deficiency     Bartonella infection     BRCA negative     Histoplasmosis 2017    full recovery    Iron deficiency     Mastocytosis     Mediastinal adenopathy \    Obsessive compulsive disorder     followed by Dr. Malone     Past Surgical History:   Procedure Laterality Date    BIOPSY-BONE MARROW Left 5/18/2017    Performed by Jeffery Wright [facility] at [facility] 2ND FLR    BRONCHOSCOPY      BRONCHOSCOPY N/A 3/17/2017    Performed by Gloria Kim [facility] at [facility] 2ND [facility]    BRONCHOSCOPY-OPERATIVE,FLEXIBLE N/A 5/11/2017    Performed by Moustapha Kaur [facility] at [facility] 2ND [facility]    DISSECTION-LYMPH NODE Right 5/29/2017    Performed by [facility]aur [facility] at [facility]    ENDOBRONCHIAL ULTRASOUND (EBUS) N/A 3/17/2017    Performed by Gloria Kim [facility] at [facility] 2ND [facility]    ESOPHAGOGASTRODUODENOSCOPY (EGD) N/A 3/19/2018    Performed by Will Puente MD at [facility] (4TH FLR)    LUNG BIOPSY  03/2017    mediastinoscopy  05/2017    MEDIASTINOSCOPY N/A 5/11/2017    Performed by [facility]aur [facility] at [facility] 2ND [facility]    THORACOSCOPY-VIDEO ASSISTED (VATS) Right 5/29/2017    Performed by [facility]apha Kaur MD at [facility] OR 2ND [place]    ULTRASOUND-ENDOSCOPIC-UPPER N/A 3/23/2018    Performed by Chuck Kumar MD at [facility] (2ND FLR)     Social History     Socioeconomic History    Marital status: Single     Spouse name: Not on file    Number of children: Not on file    Years of education: Not on file    Highest education level: Not on file   Occupational History    Occupation: Student     Comment: [employer]   Social Needs    Financial resource strain: Not on file    Food insecurity:     Worry: Not on file     Inability: Not on file     Transportation needs:     Medical: Not on file     Non-medical: Not on file   Tobacco Use    Smoking status: Never Smoker    Smokeless tobacco: Never Used   Substance and Sexual Activity    Alcohol use: Yes     Comment: occas,     Drug use: No    Sexual activity: Yes     Partners: Male     Birth control/protection: OCP   Lifestyle    Physical activity:     Days per week: Not on file     Minutes per session: Not on file    Stress: Not on file   Relationships    Social connections:     Talks on phone: Not on file     Gets together: Not on file     Attends Bahai service: Not on file     Active member of club or organization: Not on file     Attends meetings of clubs or organizations: Not on file     Relationship status: Not on file   Other Topics Concern    Are you pregnant or think you may be? No    Breast-feeding No   Social History Narrative    Lives with parents.  Student at [employer]     Family History   Problem Relation Age of Onset    Hyperlipidemia Mother     Melanoma Mother     Allergies Mother     Cancer Mother 49        breast CA - BRCA 1    Other Father         ulcerative colitis    Allergies Brother     Depression Brother     Eczema Brother     Obesity Brother     Mental illness Sister         OCD, PANDAS    Psoriasis Neg Hx     Lupus Neg Hx     Anesthesia problems Neg Hx      OB History   No data available           Current Outpatient Medications   Medication Sig Dispense Refill    ACZONE 5 % topical gel Apply topically every morning. 60 g 3    FLUoxetine 20 MG capsule Take 1 capsule (20 mg total) by mouth once daily. 30 capsule 2    multivitamin (THERAGRAN) per tablet Take 1 tablet by mouth once daily.      norethindrone-ethinyl estradiol (OVCON) 0.4-35 mg-mcg per tablet Take 1 tablet by mouth once daily. 90 tablet 3    tazarotene (TAZORAC) 0.05 % Crea cream Apply topically every evening. Apply thin film to face qhs after moisturizing. 60 g 1    tazarotene  (TAZORAC) 0.05 % Crea cream Apply topically every evening. Apply thin film to face qhs then moisturize 60 g 1     No current facility-administered medications for this visit.      Allergies: No known drug allergies     The ASCVD Risk score (Juana ATILIO Jr., et al., 2013) failed to calculate for the following reasons:    The 2013 ASCVD risk score is only valid for ages 40 to 79      ROS:  Constitutional: no weight loss, weight gain, fever, fatigue  Eyes:  No vision changes, glasses/contacts  ENT/Mouth: No ulcers, sinus problems, ears ringing, headache  Cardiovascular: No inability to lie flat, chest pain, exercise intolerance, swelling, heart palpitations  Respiratory: No wheezing, coughing blood, shortness of breath, or cough  Gastrointestinal: No diarrhea, bloody stool, nausea/vomiting, constipation, gas, hemorrhoids  Genitourinary: No blood in urine, painful urination, urgency of urination, frequency of urination, incomplete emptying, incontinence, abnormal bleeding, painful periods, heavy periods, vaginal discharge, vaginal odor, painful intercourse, sexual problems, bleeding after intercourse.  Musculoskeletal: No muscle weakness  Skin/Breast: No painful breasts, nipple discharge, masses, rash, ulcers  Neurological: No passing out, seizures, numbness, headache  Endocrine: No diabetes, hypothyroid, hyperthyroid, hot flashes, hair loss, abnormal hair growth, acne  Psychiatric: No depression, crying  Hematologic: No bruises, bleeding, swollen lymph nodes, anemia.      Physical Exam:   Constitutional: She is oriented to person, place, and time. She appears well-developed and well-nourished.      Neck: Normal range of motion. No tracheal deviation present. No thyromegaly present.    Cardiovascular: Exam reveals no edema.     Pulmonary/Chest: Effort normal. She exhibits no mass, no tenderness, no deformity and no retraction. Right breast exhibits no inverted nipple, no mass, no nipple discharge, no skin change, no  tenderness, presence, no bleeding and no swelling. Left breast exhibits no inverted nipple, no mass, no nipple discharge, no skin change, no tenderness, presence, no bleeding and no swelling. Breasts are symmetrical.        Abdominal: Soft. She exhibits no distension and no mass. There is no tenderness. There is no rebound and no guarding. No hernia. Hernia confirmed negative in the left inguinal area.     Genitourinary: Vagina normal and uterus normal. Rectal exam shows no external hemorrhoid. There is no rash, tenderness or lesion on the right labia. There is no rash, tenderness or lesion on the left labia. Uterus is not deviated. Cervix is normal. No no adexnal prolapse. Right adnexum displays no mass, no tenderness and no fullness. Left adnexum displays no mass, no tenderness and no fullness. No tenderness, bleeding, rectocele, cystocele or unspecified prolapse of vaginal walls in the vagina. No vaginal discharge found. Cervix exhibits no motion tenderness, no discharge and no friability.           Musculoskeletal: Normal range of motion and moves all extremeties. She exhibits no edema.      Lymphadenopathy:        Right: No inguinal adenopathy present.        Left: No inguinal adenopathy present.    Neurological: She is alert and oriented to person, place, and time.    Skin: No rash noted. No erythema. No pallor.    Psychiatric: She has a normal mood and affect. Her behavior is normal. Judgment and thought content normal.     +blood in vagina- on menses      ASSESSMENT:   well woman  no contraindication to continue use of oral contraceptives    PLAN:   pap smear  return annually or prn

## 2019-06-14 ENCOUNTER — PATIENT MESSAGE (OUTPATIENT)
Dept: OBSTETRICS AND GYNECOLOGY | Facility: CLINIC | Age: 24
End: 2019-06-14

## 2019-06-14 ENCOUNTER — TELEPHONE (OUTPATIENT)
Dept: OBSTETRICS AND GYNECOLOGY | Facility: CLINIC | Age: 24
End: 2019-06-14

## 2019-06-14 DIAGNOSIS — N76.0 ACUTE VAGINITIS: Primary | ICD-10-CM

## 2019-06-14 RX ORDER — METRONIDAZOLE 500 MG/1
500 TABLET ORAL 2 TIMES DAILY
Qty: 14 TABLET | Refills: 0 | Status: SHIPPED | OUTPATIENT
Start: 2019-06-14 | End: 2019-06-21

## 2019-06-14 NOTE — TELEPHONE ENCOUNTER
----- Message from Adama Talavera sent at 6/14/2019 11:38 AM CDT -----  Contact: EVANS,MADELINE COLLETTE [[id number]]  Name of Who is Calling: EVANS,MADELINE COLLETTE [[id number]]    What is the request in detail: Would like to speak with staff in regards to scheduling her follow up appointment and a medication question (does not know the name of medication). Please advise      Can the clinic reply by [facility]: no      What Number to [facility] Back [facility]: [phone]

## 2019-06-14 NOTE — TELEPHONE ENCOUNTER
Pt requesting refill of Flagyl for vaginal odor and white discharge. Saw Brooklynn for similar sx in 7/2018. Pharmacy confirmed. Will pend rx to provider.

## 2019-06-28 ENCOUNTER — PATIENT MESSAGE (OUTPATIENT)
Dept: INFECTIOUS DISEASES | Facility: CLINIC | Age: 24
End: 2019-06-28

## 2019-07-08 ENCOUNTER — PATIENT MESSAGE (OUTPATIENT)
Dept: INTERNAL MEDICINE | Facility: CLINIC | Age: 24
End: 2019-07-08

## 2019-07-09 ENCOUNTER — PATIENT MESSAGE (OUTPATIENT)
Dept: INTERNAL MEDICINE | Facility: CLINIC | Age: 24
End: 2019-07-09

## 2019-07-09 ENCOUNTER — CLINICAL SUPPORT (OUTPATIENT)
Dept: INTERNAL MEDICINE | Facility: CLINIC | Age: 24
End: 2019-07-09
Payer: COMMERCIAL

## 2019-07-09 DIAGNOSIS — Z11.1 SCREENING-PULMONARY TB: Primary | ICD-10-CM

## 2019-07-09 PROCEDURE — 86580 POCT TB SKIN TEST: ICD-10-PCS | Mod: S$GLB,,, | Performed by: INTERNAL MEDICINE

## 2019-07-09 PROCEDURE — 86580 TB INTRADERMAL TEST: CPT | Mod: S$GLB,,, | Performed by: INTERNAL MEDICINE

## 2019-07-09 NOTE — PROGRESS NOTES
Patient two identifiers used. PPD placed on L arm. Pt tolerated well, asked to wait 15 minutes post placement. Advised to return in 48-72 hours for read

## 2019-07-11 ENCOUNTER — CLINICAL SUPPORT (OUTPATIENT)
Dept: INTERNAL MEDICINE | Facility: CLINIC | Age: 24
End: 2019-07-11
Payer: COMMERCIAL

## 2019-07-11 LAB
TB INDURATION - 48 HR READ: 0 MM
TB INDURATION - 72 HR READ: NORMAL MM
TB SKIN TEST - 48 HR READ: NEGATIVE
TB SKIN TEST - 72 HR READ: NORMAL

## 2019-07-16 ENCOUNTER — PATIENT MESSAGE (OUTPATIENT)
Dept: INFECTIOUS DISEASES | Facility: CLINIC | Age: 24
End: 2019-07-16

## 2019-07-16 ENCOUNTER — TELEPHONE (OUTPATIENT)
Dept: INFECTIOUS DISEASES | Facility: CLINIC | Age: 24
End: 2019-07-16

## 2019-07-16 NOTE — TELEPHONE ENCOUNTER
Tried to contact patient to inform her [facility] will not speak to us regarding her bill with them. Patient will have to figure this out with [place], herself, due to this being a [facility] issue. Financial department at [facility] does not know anything about this form and stated this is a [facility] issue. Can't leave a voicemail due to patient's mailbox is full.      Camila Lynn MD  You 6 days ago     Don't know what form she needs fill out - could you find out so I can fill out?    Routing comment       You routed conversation to Camila Lynn MD 2 weeks ago      Evans, Madeline Collette Garcia-Diaz, Julia B., MD 2 weeks ago        Papa Rand! Last year, [facility] read my scans on 6/26 in advance of my appointment on 12/28/18.  [facility] needs an [facility] Exception Request form for 6/26/18 in order to clear that bill.  [facility] says that is all thats needed to clear it. Could you please submit that form for me so my account can be taken out of collections?   Thanks so much,   Donna

## 2019-08-09 ENCOUNTER — NURSE TRIAGE (OUTPATIENT)
Dept: ADMINISTRATIVE | Facility: CLINIC | Age: 24
End: 2019-08-09

## 2019-08-09 ENCOUNTER — TELEPHONE (OUTPATIENT)
Dept: INFECTIOUS DISEASES | Facility: CLINIC | Age: 24
End: 2019-08-09

## 2019-08-09 NOTE — TELEPHONE ENCOUNTER
Tightness in chest, but lower than before. She stated it feels like the same thing as before, but just lower than the last time. She stated sob when she breathes in.

## 2019-08-09 NOTE — TELEPHONE ENCOUNTER
Reason for Disposition   Chest pain lasting longer than 5 minutes    Protocols used: CHEST PAIN-A-[place]  CP- similar sx in past was swollen LN. pain started 3 weeks ago getting worse. +Pain with deep breath , pain at nipple line. Pain continuous x 3 weeks - pain constant today. rates 4. Difficulty breathing only when exercising. Some pain to back. rec ED. Pt requests to speak with PCP. rec ED due to no appts today. Pt requests to have office message sent to dr Boyle's nurse to please call. Message sent. Call back with questions

## 2019-08-09 NOTE — TELEPHONE ENCOUNTER
----- Message from Carmencita Yañez sent at 8/9/2019  1:54 PM CDT -----  Contact: Patient  Needs Advice    Reason for call: Donna Kumar would like to speak to you or Dr. Manzano        Communication Preference: [phone]    Additional Information:

## 2019-08-13 ENCOUNTER — PATIENT MESSAGE (OUTPATIENT)
Dept: INFECTIOUS DISEASES | Facility: CLINIC | Age: 24
End: 2019-08-13

## 2019-08-13 DIAGNOSIS — R07.9 CHEST PAIN, UNSPECIFIED TYPE: Primary | ICD-10-CM

## 2019-08-15 ENCOUNTER — HOSPITAL ENCOUNTER (OUTPATIENT)
Dept: RADIOLOGY | Facility: HOSPITAL | Age: 24
Discharge: HOME | End: 2019-08-15
Attending: INTERNAL MEDICINE
Payer: COMMERCIAL

## 2019-08-15 DIAGNOSIS — R07.9 CHEST PAIN, UNSPECIFIED TYPE: ICD-10-CM

## 2019-08-15 PROCEDURE — 25500020 PHARM REV CODE 255: Performed by: INTERNAL MEDICINE

## 2019-08-15 PROCEDURE — 71260 CT CHEST WITH CONTRAST: ICD-10-PCS | Mod: 26,,, | Performed by: RADIOLOGY

## 2019-08-15 PROCEDURE — 71260 CT THORAX DX C+: CPT | Mod: TC

## 2019-08-15 PROCEDURE — 71260 CT THORAX DX C+: CPT | Mod: 26,,, | Performed by: RADIOLOGY

## 2019-08-15 RX ADMIN — IOHEXOL 75 ML: 350 INJECTION, SOLUTION INTRAVENOUS at 03:08

## 2019-09-09 ENCOUNTER — PATIENT MESSAGE (OUTPATIENT)
Dept: DERMATOLOGY | Facility: CLINIC | Age: 24
End: 2019-09-09

## 2019-09-16 ENCOUNTER — PATIENT MESSAGE (OUTPATIENT)
Dept: DERMATOLOGY | Facility: CLINIC | Age: 24
End: 2019-09-16

## 2019-09-16 ENCOUNTER — OFFICE VISIT (OUTPATIENT)
Dept: DERMATOLOGY | Facility: CLINIC | Age: 24
End: 2019-09-16
Payer: COMMERCIAL

## 2019-09-16 DIAGNOSIS — L71.0 PERIORAL DERMATITIS: Primary | ICD-10-CM

## 2019-09-16 PROCEDURE — 99999 PR PBB SHADOW E&M-EST. PATIENT-LVL II: ICD-10-PCS | Mod: PBBFAC,,, | Performed by: DERMATOLOGY

## 2019-09-16 PROCEDURE — 99999 PR PBB SHADOW E&M-EST. PATIENT-LVL II: CPT | Mod: PBBFAC,,, | Performed by: DERMATOLOGY

## 2019-09-16 PROCEDURE — 99214 PR OFFICE/OUTPT VISIT, EST, LEVL IV, 30-39 MIN: ICD-10-PCS | Mod: S$GLB,,, | Performed by: DERMATOLOGY

## 2019-09-16 PROCEDURE — 99214 OFFICE O/P EST MOD 30 MIN: CPT | Mod: S$GLB,,, | Performed by: DERMATOLOGY

## 2019-09-16 RX ORDER — METRONIDAZOLE 10 MG/G
GEL TOPICAL
Qty: 60 G | Refills: 6 | Status: SHIPPED | OUTPATIENT
Start: 2019-09-16 | End: 2022-03-08

## 2019-09-16 RX ORDER — DOXYCYCLINE HYCLATE 50 MG/1
50 TABLET, FILM COATED ORAL 2 TIMES DAILY
Qty: 60 TABLET | Refills: 3 | Status: SHIPPED | OUTPATIENT
Start: 2019-09-16 | End: 2019-09-17 | Stop reason: ALTCHOICE

## 2019-09-16 RX ORDER — SODIUM SULFACETAMIDE AND SULFUR 80; 40 MG/ML; MG/ML
SOLUTION TOPICAL
Qty: 1 BOTTLE | Refills: 5 | Status: SHIPPED | OUTPATIENT
Start: 2019-09-16

## 2019-09-16 NOTE — PATIENT INSTRUCTIONS
-wash face after using high fluoride toothpaste, avoid cinnamon products  - Start sulfur wash BID, use La-roche posay anti-oxidant serum q am along with toluidine moisturizer with sunscreen, in the evening use toluidine moisturizer La Roche posay brand  -When flaring start Metrogel BID, if does not clear or severe breakout start doxycycline tablets as directed below (once daily x 1  Month), okay to decrease Metrogel to daily x1 month and then eventually 2 x a week

## 2019-09-16 NOTE — PROGRESS NOTES
Subjective:       Patient ID:  Madeline Collette Evans is a 24 y.o. female who presents for   Chief Complaint   Patient presents with    Rash     Pt presents today for rash periorbital, scaling and redness, 1 months, Tx. otc Cetaphil, Suncreen    Was using Tazorac, however causing scaly spots, started using 3 x a week and more scaly areas came out. So patient has discontinued it.   States Acne is well controlled at this point not using Aczone at this time.     Rash  - Initial  Affected locations: face  Duration: 1 month  Signs / symptoms: spreading, scaling and redness  Severity: mild  Timing: constant  Aggravated by: nothing  Relieving factors/Treatments tried: nothing  Improvement on treatment: no relief        Review of Systems   Constitutional: Negative for fever, chills, weight loss, weight gain, fatigue, night sweats and malaise.   Eyes: Negative for eye irritation.        Having eye muscle soreness but went away   Skin: Positive for rash, daily sunscreen use and activity-related sunscreen use. Negative for recent sunburn.   Hematologic/Lymphatic: Does not bruise/bleed easily.        Objective:    Physical Exam   Constitutional: She appears well-developed and well-nourished.   Neurological: She is alert and oriented to person, place, and time.   Psychiatric: She has a normal mood and affect.   Skin:   Areas Examined (abnormalities noted in diagram):   Head / Face Inspection Performed              Diagram Legend     Erythematous scaling macule/papule c/w actinic keratosis       Vascular papule c/w angioma      Pigmented verrucoid papule/plaque c/w seborrheic keratosis      Yellow umbilicated papule c/w sebaceous hyperplasia      Irregularly shaped tan macule c/w lentigo     1-2 mm smooth white papules consistent with Milia      Movable subcutaneous cyst with punctum c/w epidermal inclusion cyst      Subcutaneous movable cyst c/w pilar cyst      Firm pink to brown papule c/w dermatofibroma      Pedunculated  fleshy papule(s) c/w skin tag(s)      Evenly pigmented macule c/w junctional nevus     Mildly variegated pigmented, slightly irregular-bordered macule c/w mildly atypical nevus      Flesh colored to evenly pigmented papule c/w intradermal nevus       Pink pearly papule/plaque c/w basal cell carcinoma      Erythematous hyperkeratotic cursted plaque c/w SCC      Surgical scar with no sign of skin cancer recurrence      Open and closed comedones      Inflammatory papules and pustules      Verrucoid papule consistent consistent with wart     Erythematous eczematous patches and plaques     Dystrophic onycholytic nail with subungual debris c/w onychomycosis     Umbilicated papule    Erythematous-base heme-crusted tan verrucoid plaque consistent with inflamed seborrheic keratosis     Erythematous Silvery Scaling Plaque c/w Psoriasis     See annotation      Assessment / Plan:        Perioral dermatitis  -     sulfacetamide sodium-sulfur (SULFACLEANSE 8-4) 8-4 % Susp; Wash face qd - bid  Dispense: 1 Bottle; Refill: 5  -     metronidazole 1% (METROGEL) 1 % Gel; AAA face qday  Dispense: 60 g; Refill: 6  -     doxycycline hyclate 50 mg Tab; Take 50 mg by mouth 2 (two) times daily.  Dispense: 60 tablet; Refill: 3  - Mainly due to underlying inflammatory process triggering rosacea including heat and the sun, encouraged patient to avoid triggers including hot and spicy foods, heat, sun, sweating (as much as possible)  -wash face after using high fluoride toothpaste, avoid cinnamon products  - Start sulfur wash BID, use La-roche posay anti-oxidant serum q am along with toluidine moisturizer with sunscreen, in the evening use toluidine moisturizer La Roche posay brand  -When flaring start Metrogel BID, if does not clear or severe breakout start doxycycline tablets as directed below (once daily x 1  Month), okay to decrease Metrogel to daily x1 month and then eventually 2 x a week              No follow-ups on file.

## 2019-09-17 ENCOUNTER — HOSPITAL ENCOUNTER (EMERGENCY)
Facility: HOSPITAL | Age: 24
Discharge: HOME | End: 2019-09-18
Attending: EMERGENCY MEDICINE
Payer: COMMERCIAL

## 2019-09-17 DIAGNOSIS — L71.0 PERIORAL DERMATITIS: Primary | ICD-10-CM

## 2019-09-17 DIAGNOSIS — R10.30 LOWER ABDOMINAL PAIN: Primary | ICD-10-CM

## 2019-09-17 LAB
ALBUMIN SERPL BCP-MCNC: 4.4 G/DL (ref 3.5–5.2)
ALP SERPL-CCNC: 54 U/L (ref 55–135)
ALT SERPL W/O P-5'-P-CCNC: 12 U/L (ref 10–44)
ANION GAP SERPL CALC-SCNC: 10 MMOL/L (ref 8–16)
AST SERPL-CCNC: 23 U/L (ref 10–40)
B-HCG UR QL: NEGATIVE
BASOPHILS # BLD AUTO: 0.04 K/UL (ref 0–0.2)
BASOPHILS NFR BLD: 0.4 % (ref 0–1.9)
BILIRUB SERPL-MCNC: 0.7 MG/DL (ref 0.1–1)
BUN SERPL-MCNC: 14 MG/DL (ref 6–20)
CALCIUM SERPL-MCNC: 9.7 MG/DL (ref 8.7–10.5)
CHLORIDE SERPL-SCNC: 104 MMOL/L (ref 95–110)
CO2 SERPL-SCNC: 22 MMOL/L (ref 23–29)
CREAT SERPL-MCNC: 0.7 MG/DL (ref 0.5–1.4)
CTP QC/QA: YES
DIFFERENTIAL METHOD: ABNORMAL
EOSINOPHIL # BLD AUTO: 0.1 K/UL (ref 0–0.5)
EOSINOPHIL NFR BLD: 0.6 % (ref 0–8)
ERYTHROCYTE [DISTWIDTH] IN BLOOD BY AUTOMATED COUNT: 12.6 % (ref 11.5–14.5)
EST. GFR  (AFRICAN AMERICAN): >60 ML/MIN/1.73 M^2
EST. GFR  (NON AFRICAN AMERICAN): >60 ML/MIN/1.73 M^2
GLUCOSE SERPL-MCNC: 83 MG/DL (ref 70–110)
HCT VFR BLD AUTO: 39.4 % (ref 37–48.5)
HGB BLD-MCNC: 12.9 G/DL (ref 12–16)
IMM GRANULOCYTES # BLD AUTO: 0.03 K/UL (ref 0–0.04)
IMM GRANULOCYTES NFR BLD AUTO: 0.3 % (ref 0–0.5)
LACTATE SERPL-SCNC: 0.6 MMOL/L (ref 0.5–2.2)
LYMPHOCYTES # BLD AUTO: 1.5 K/UL (ref 1–4.8)
LYMPHOCYTES NFR BLD: 13.7 % (ref 18–48)
MCH RBC QN AUTO: 30 PG (ref 27–31)
MCHC RBC AUTO-ENTMCNC: 32.7 G/DL (ref 32–36)
MCV RBC AUTO: 92 FL (ref 82–98)
MONOCYTES # BLD AUTO: 0.5 K/UL (ref 0.3–1)
MONOCYTES NFR BLD: 4 % (ref 4–15)
NEUTROPHILS # BLD AUTO: 9.1 K/UL (ref 1.8–7.7)
NEUTROPHILS NFR BLD: 81 % (ref 38–73)
NRBC BLD-RTO: 0 /100 WBC
PLATELET # BLD AUTO: 244 K/UL (ref 150–350)
PMV BLD AUTO: 10.7 FL (ref 9.2–12.9)
POTASSIUM SERPL-SCNC: 3.9 MMOL/L (ref 3.5–5.1)
PROT SERPL-MCNC: 7.5 G/DL (ref 6–8.4)
RBC # BLD AUTO: 4.3 M/UL (ref 4–5.4)
SODIUM SERPL-SCNC: 136 MMOL/L (ref 136–145)
WBC # BLD AUTO: 11.23 K/UL (ref 3.9–12.7)

## 2019-09-17 PROCEDURE — 96374 THER/PROPH/DIAG INJ IV PUSH: CPT

## 2019-09-17 PROCEDURE — 83605 ASSAY OF LACTIC ACID: CPT

## 2019-09-17 PROCEDURE — 81025 URINE PREGNANCY TEST: CPT | Performed by: EMERGENCY MEDICINE

## 2019-09-17 PROCEDURE — 99284 EMERGENCY DEPT VISIT MOD MDM: CPT | Mod: ,,, | Performed by: EMERGENCY MEDICINE

## 2019-09-17 PROCEDURE — 96361 HYDRATE IV INFUSION ADD-ON: CPT

## 2019-09-17 PROCEDURE — 99285 EMERGENCY DEPT VISIT HI MDM: CPT | Mod: 25

## 2019-09-17 PROCEDURE — 99284 PR EMERGENCY DEPT VISIT,LEVEL IV: ICD-10-PCS | Mod: ,,, | Performed by: EMERGENCY MEDICINE

## 2019-09-17 PROCEDURE — 63600175 PHARM REV CODE 636 W HCPCS: Performed by: EMERGENCY MEDICINE

## 2019-09-17 PROCEDURE — 85025 COMPLETE CBC W/AUTO DIFF WBC: CPT

## 2019-09-17 PROCEDURE — 80053 COMPREHEN METABOLIC PANEL: CPT

## 2019-09-17 RX ORDER — ONDANSETRON 2 MG/ML
4 INJECTION INTRAMUSCULAR; INTRAVENOUS
Status: COMPLETED | OUTPATIENT
Start: 2019-09-17 | End: 2019-09-17

## 2019-09-17 RX ORDER — DOXYCYCLINE 50 MG/1
50 CAPSULE ORAL EVERY 12 HOURS
Qty: 60 CAPSULE | Refills: 2 | Status: SHIPPED | OUTPATIENT
Start: 2019-09-17 | End: 2021-01-25

## 2019-09-17 RX ORDER — MORPHINE SULFATE 2 MG/ML
2 INJECTION, SOLUTION INTRAMUSCULAR; INTRAVENOUS
Status: DISCONTINUED | OUTPATIENT
Start: 2019-09-17 | End: 2019-09-18 | Stop reason: HOSPADM

## 2019-09-17 RX ADMIN — SODIUM CHLORIDE 1000 ML: 0.9 INJECTION, SOLUTION INTRAVENOUS at 11:09

## 2019-09-17 RX ADMIN — ONDANSETRON 4 MG: 2 INJECTION INTRAMUSCULAR; INTRAVENOUS at 11:09

## 2019-09-18 VITALS
RESPIRATION RATE: 16 BRPM | OXYGEN SATURATION: 98 % | BODY MASS INDEX: 21.26 KG/M2 | DIASTOLIC BLOOD PRESSURE: 77 MMHG | HEART RATE: 68 BPM | SYSTOLIC BLOOD PRESSURE: 106 MMHG | WEIGHT: 120 LBS | TEMPERATURE: 98 F | HEIGHT: 63 IN

## 2019-09-18 LAB
BILIRUB UR QL STRIP: NEGATIVE
CLARITY UR REFRACT.AUTO: CLEAR
COLOR UR AUTO: ABNORMAL
GLUCOSE UR QL STRIP: NEGATIVE
HGB UR QL STRIP: NEGATIVE
KETONES UR QL STRIP: ABNORMAL
LEUKOCYTE ESTERASE UR QL STRIP: NEGATIVE
NITRITE UR QL STRIP: NEGATIVE
PH UR STRIP: 6 [PH] (ref 5–8)
PROT UR QL STRIP: NEGATIVE
SP GR UR STRIP: 1.01 (ref 1–1.03)
URN SPEC COLLECT METH UR: ABNORMAL

## 2019-09-18 PROCEDURE — 81003 URINALYSIS AUTO W/O SCOPE: CPT

## 2019-09-18 RX ORDER — ONDANSETRON 4 MG/1
4 TABLET, FILM COATED ORAL EVERY 6 HOURS
Qty: 12 TABLET | Refills: 0 | Status: SHIPPED | OUTPATIENT
Start: 2019-09-18 | End: 2021-01-25

## 2019-09-18 NOTE — ED TRIAGE NOTES
Pt presents with RLQ abdominal pain since 1400 this afternoon, pt reports she was sitting in class when pain started. Reports rebound tenderness to RLQ and reports pain with urination to RLQ. Reports nausea but denies V/D    Patient Identifiers for Madeline Collette Evans checked and correct  LOC: The patient is awake, alert and aware of environment with an appropriate affect, the patient is oriented x 3 and speaking appropriate.  APPEARANCE: Patient resting comfortably and in no acute distress, patient is clean and well groomed, patient's clothing is properly fastened.  SKIN: The skin is warm and dry, patient has normal skin turgor and moist mucus membranes,no rashes or lesions.Skin Intact , No Breakdown Noted  Musculoskeletal :  Normal range of motion noted. Moves all extremeties well, No swelling or tenderness noted  RESPIRATORY: Airway is open and patent, respirations are spontaneous, patient has a normal effort and rate.  CARDIAC: Patient has a normal rate and rhythm, no periphreal edema noted, capillary refill < 3 seconds.   ABDOMEN: Soft, no distention noted. Rebound tenderness to RLQ  PULSES: 2+  And symmetrical in all extremeties  NEUROLOGIC: PERRL. facial expression is symmetrical, patient moving all extremities, normal sensation in all extremities when touched with a finger.The patient is awake, alert and cooperative with a calm affect, patient is aware of environment.    Will continue to monitor

## 2019-09-18 NOTE — ED PROVIDER NOTES
"Encounter Date: 9/17/2019    SCRIBE #1 NOTE: I, Eliot Lugo, am scribing for, and in the presence of,  Dr. Layne. I have scribed the entire note.       History     Chief Complaint   Patient presents with    Abdominal Pain     RLQ pain since 1400. Pt reports "fever" of 100. Pt reports dysuria. Rebound tenderness noted     Patient is a 21 year old female with co-morbidities including mastocytosis, OCD, iron deficiency, B12 deficiency, mediastinal adenopathy, abnormal pap smear, and histoplasmosis who presents with lower abdominal pain since 2 o'clock.  Pain is improved with lying still. At home, her temperature was recorded at 99.5. Endorses waves of nausea but no vomiting. Tried to have a yogurt around 6PM which was her last PO intake. Pain radiates to her back at times. Last period was 3 weeks ago. No history of abdominal surgery.  Onset of her symptoms were sudden, associated with lower abdominal pain with no significant localization or radiation.  No other aggravating or alleviating factors.      The history is provided by the patient.     Review of patient's allergies indicates:   Allergen Reactions    No known drug allergies      Past Medical History:   Diagnosis Date    Abnormal Pap smear of cervix     B12 deficiency     Bartonella infection     BRCA negative     Histoplasmosis 2017    full recovery    Iron deficiency     Mastocytosis     Mediastinal adenopathy \    Obsessive compulsive disorder     followed by Dr. Malone     Past Surgical History:   Procedure Laterality Date    BIOPSY-BONE MARROW Left 5/18/2017    [facility] at [facility] OR 2ND FLR    BRONCHOSCOPY      BRONCHOSCOPY N/A 3/17/2017    Performed by [facility] at [facility] OR 2ND FLR    BRONCHOSCOPY-OPERATIVE,FLEXIBLE N/A 5/11/2017    Performed [facility]apha Kaur [facility] at [facility] OR [facility]    DISSECTION-LYMPH NODE Right 5/29/2017    [facility]    ENDOBRONCHIAL " ULTRASOUND (EBUS) N/A 3/17/2017    [facility] at [facility] OR 2ND FLR    ESOPHAGOGASTRODUODENOSCOPY (EGD) N/A 3/19/2018    Performed by Will Puente MD at [facility] ENDO (4TH FLR)    LUNG BIOPSY  03/2017    mediastinoscopy  05/2017    MEDIASTINOSCOPY N/A 5/11/2017    Performed by [facility]aur [facility] at [facility] OR 2ND FLR    THORACOSCOPY-VIDEO ASSISTED (VATS) Right 5/29/2017    Performed by [facility]aur [facility] at [facility] OR 2ND FLR    ULTRASOUND-ENDOSCOPIC-UPPER N/A 3/23/2018    Performed by Chuck Kumar MD at [facility] ENDO (2ND FLR)     Family History   Problem Relation Age of Onset    Hyperlipidemia Mother     Melanoma Mother     Allergies Mother     Cancer Mother 49        breast CA - BRCA 1    Other Father         ulcerative colitis    Allergies Brother     Depression Brother     Eczema Brother     Obesity Brother     Mental illness Sister         OCD, PANDAS    Psoriasis Neg Hx     Lupus Neg Hx     Anesthesia problems Neg Hx      Social History     Tobacco Use    Smoking status: Never Smoker    Smokeless tobacco: Never Used   Substance Use Topics    Alcohol use: Yes     Comment: occas,     Drug use: No     Review of Systems   Constitutional: Negative for chills and fever.   HENT: Negative for sore throat and trouble swallowing.    Eyes: Negative for pain and redness.   Respiratory: Negative for cough and shortness of breath.    Gastrointestinal: Positive for abdominal pain (RLQ). Negative for diarrhea, nausea and vomiting.   Genitourinary: Positive for dysuria. Negative for vaginal discharge.   Musculoskeletal: Negative for back pain and neck pain.   Neurological: Negative for light-headedness and headaches.   Psychiatric/Behavioral: Negative for behavioral problems and confusion.       Physical Exam     Initial Vitals [09/17/19 2152]   BP Pulse Resp Temp SpO2   133/68 110 16 98.7 °F (37.1 °C) 99 %      MAP       --         Physical Exam  Constitutional: Well-developed.  Well-nourished. Minimal emotional distress.  HENT: OP clear and moist.  NECK: Supple. No cervical LAD.  CARDIAC: RRR. Normal S1/ S2. No murmurs, gallops or rubs. 2+ distal pulses.  PULM: Normal effort. Breath sounds clear - no wheezes, rales, rhonchi.  ABD: Soft, non-tender, non-distended, normal BS. Negative Salas sign, no McBurney's point tenderness. No guarding, positive Rovsing's  : No CVA tenderness.   RECTAL: deferred  MS: Full ROM. No edema.  NEURO: Alert. Moving all extremities purposefully.  SKIN: Warm and dry. No rash or lesions.  PSYCH: Normal judgment. Normal affect.    ED Course   Procedures  Labs Reviewed   CBC W/ AUTO DIFFERENTIAL - Abnormal; Notable for the following components:       Result Value    Gran # (ANC) 9.1 (*)     Gran% 81.0 (*)     Lymph% 13.7 (*)     All other components within normal limits   COMPREHENSIVE METABOLIC PANEL - Abnormal; Notable for the following components:    CO2 22 (*)     Alkaline Phosphatase 54 (*)     All other components within normal limits   LACTIC ACID, PLASMA   URINALYSIS, REFLEX TO URINE CULTURE   POCT URINE PREGNANCY          Imaging Results          US Pelvis Comp with Transvag NON-OB (xpd (Final result)  Result time 09/18/19 01:40:11    Final result by Chase Chase MD (09/18/19 01:40:11)                 Impression:      No significant sonographic abnormality identified.    Electronically signed by resident: Jacob Santos  Date:    09/18/2019  Time:    01:21    Electronically signed by: Chase Chase MD  Date:    09/18/2019  Time:    01:40             Narrative:    EXAMINATION:  US PELVIS COMP WITH TRANSVAG NON-OB (XPD)    CLINICAL HISTORY:  ovarian torsion, cyst, toa;    TECHNIQUE:  Transabdominal sonography of the pelvis was performed, followed by transvaginal sonography to better evaluate the uterus and ovaries.    COMPARISON:  CT abdomen pelvis 05/29/2018.    FINDINGS:  Uterus:    Size: 6.9 x 2.3 x 3.5 cm    Masses: None    Endometrium: Normal in  this pre menopausal patient, measuring 7 mm.    Right ovary:    Size: 2.4 x 2.0 x 2.6 cm    Appearance: Normal    Vascular flow: Normal.    Left ovary:    Size: 1.7 x 1.5 x 1.8 cm    Appearance: Normal    Vascular Flow: Normal.    Free Fluid:    None.                               US Abdomen Limited (Final result)  Result time 09/18/19 01:38:37    Final result by Chase Chase MD (09/18/19 01:38:37)                 Impression:      No significant sonographic abnormality identified.  The appendix was not visualized.  If clinical concern persists for acute appendicitis, consider CT abdomen/pelvis with contrast.    Electronically signed by resident: Jacob Santos  Date:    09/18/2019  Time:    01:15    Electronically signed by: Chase Chase MD  Date:    09/18/2019  Time:    01:38             Narrative:    EXAMINATION:  US ABDOMEN LIMITED    CLINICAL HISTORY:  appy;    TECHNIQUE:  Limited ultrasound of the right lower quadrant was performed.    COMPARISON:  CT abdomen with contrast 05/29/2018.    FINDINGS:  Limited ultrasound evaluation of the right lower quadrant was performed to assess for appendicitis.  The appendix was not visualized.  There is no rebound tenderness.  There are no masses or focal fluid collections.  No lymphadenopathy.  The right ovary is visualized and demonstrates no significant abnormalities.                                 Medical Decision Making:   History:   Old Medical Records: I decided to obtain old medical records.  Initial Assessment:   24 year old female presenting with abdominal pain.  Differential Diagnosis:   Differential diagnosis includes but is not limited to:  Appendicitis, ovarian torsion, TOA, ovarian cyst, hemorrhagic cyst, pregnancy, UTI, pyelonephritis, ureterolithiasis, biliary pathology.      Independently Interpreted Test(s):   I have ordered and independently interpreted X-rays - see prior notes.  Clinical Tests:   Lab Tests: Ordered and Reviewed  Radiological Study:  Ordered and Reviewed    Emergent evaluation of patient presenting with lower abdominal pain with concern for appendicitis.  She is currently afebrile, vital signs are stable. Physical exam findings with tenderness in the left lower quadrant as well as right lower quadrant.  Lung sounds clear, cardiac exam without any murmurs, rubs or gallops.  Initially tachycardic, resolved on repeat evaluation.  She was given Zofran for nausea, and offered morphine for pain which she declined.  Bedside ultrasound obtained by myself, with no significant tenderness in the right lower quadrant with direct pressure.  No free fluid, negative fast I did not visualize appendix on my limited view.  She did however have positive Rovsing sign and left lower quadrant.  Ultrasound obtained of the pelvis to rule out ovarian torsion and pelvic pathology as well as abdominal ultrasound with no secondary signs of appendicitis although appendix was not visualized.  Labs with no significant leukocytosis.  Remainder of labs unremarkable. Lactate not elevated.  No fevers while in the ED.  On repeat evaluation, pain improved, with no focal tenderness or peritoneal signs on repeat evaluation.  UA with no evidence of UTI.  Denies any vaginal discharge, STDs or acute encounters.  Although pelvic ultrasound was obtained, transvaginally, pelvic exam declined at this time given absence of symptoms. Given negative ultrasound of abdomen and pelvis, I offered the patient CT scan for further evaluation.  However, on a shared decision model between myself and the patient, we opted for a watchful waiting as no peritoneal signs or symptoms. I feel this is reasonable, given absence of peritoneal signs, absence of fevers with no secondary signs of appendicitis on ultrasound.  I gave the patient and her father strict ED precautions and return instructions, including to return tomorrow if any worsening symptoms. They verbalized understanding, with all risks and  benefits explained at length.  Do not suspect any perforation, as no evidence of free fluid or positive fast. Patient agreeable to discharge plan. Strict ED precautions and return instructions discussed at length and patient verbalized understanding. All questions were answered and ample time was given for questions.      Complexity:  High risk          Scribe Attestation:   Scribe #1: I performed the above scribed service and the documentation accurately describes the services I performed. I attest to the accuracy of the note.    I, Dr. Gamal Layne, personally performed the services described in this documentation. All medical record entries made by the scribe were at my direction and in my presence.  I have reviewed the chart and agree that the record reflects my personal performance and is accurate and complete.              Clinical Impression:       ICD-10-CM ICD-9-CM   1. Lower abdominal pain R10.30 789.09         Disposition:   Disposition: Discharged  Condition: Stable      Gamal Layne DO  Dept of Emergency Medicine   [facility]  Spectralink: [id number]       Gamal Layne DO  09/18/19 0206

## 2019-09-18 NOTE — DISCHARGE INSTRUCTIONS
Today your evaluation for abdominal pain did not reveal any acute findings to include ovarian torsion, ovarian cyst or secondary signs of appendicitis.  However, you may still have early appendicitis.  If her symptoms worsen, including fevers, worsening abdominal pain or any concerns return to the emergency department immediately for further imaging and management.    Our goal in the emergency department is to always give you outstanding care and exceptional service. You may receive a survey by mail or e-mail in the next week regarding your experience in our ED. We would greatly appreciate your completing and returning the survey. Your feedback provides us with a way to recognize our staff who give very good care and it helps us learn how to improve when your experience was below our aspiration of excellence.

## 2019-10-30 ENCOUNTER — IMMUNIZATION (OUTPATIENT)
Dept: PHARMACY | Facility: CLINIC | Age: 24
End: 2019-10-30
Payer: COMMERCIAL

## 2019-11-13 ENCOUNTER — TELEPHONE (OUTPATIENT)
Dept: PULMONOLOGY | Facility: CLINIC | Age: 24
End: 2019-11-13

## 2019-11-13 DIAGNOSIS — R06.02 SOB (SHORTNESS OF BREATH): Primary | ICD-10-CM

## 2019-11-18 ENCOUNTER — OFFICE VISIT (OUTPATIENT)
Dept: PULMONOLOGY | Facility: CLINIC | Age: 24
End: 2019-11-18
Payer: COMMERCIAL

## 2019-11-18 ENCOUNTER — HOSPITAL ENCOUNTER (OUTPATIENT)
Dept: PULMONOLOGY | Facility: CLINIC | Age: 24
Discharge: HOME | End: 2019-11-18
Payer: COMMERCIAL

## 2019-11-18 VITALS
BODY MASS INDEX: 21.44 KG/M2 | WEIGHT: 121 LBS | SYSTOLIC BLOOD PRESSURE: 118 MMHG | WEIGHT: 120.81 LBS | HEIGHT: 63 IN | OXYGEN SATURATION: 99 % | BODY MASS INDEX: 21.41 KG/M2 | HEART RATE: 86 BPM | DIASTOLIC BLOOD PRESSURE: 69 MMHG | HEIGHT: 63 IN

## 2019-11-18 DIAGNOSIS — R07.1 PAIN OF ANTERIOR CHEST WALL WITH RESPIRATION: Primary | ICD-10-CM

## 2019-11-18 DIAGNOSIS — R06.02 SOB (SHORTNESS OF BREATH): ICD-10-CM

## 2019-11-18 DIAGNOSIS — Z86.19 HISTORY OF HISTOPLASMOSIS: ICD-10-CM

## 2019-11-18 DIAGNOSIS — R59.0 MEDIASTINAL LYMPHADENOPATHY: ICD-10-CM

## 2019-11-18 PROBLEM — R59.1 LYMPHADENOPATHY: Status: RESOLVED | Noted: 2018-03-23 | Resolved: 2019-11-18

## 2019-11-18 LAB
DLCO ADJ PRE: 26.32 ML/(MIN*MMHG) (ref 21.51–32.97)
DLCO SINGLE BREATH LLN: 21.51
DLCO SINGLE BREATH PRE REF: 95.1 %
DLCO SINGLE BREATH REF: 27.24
DLCOC SBVA LLN: 4.02
DLCOC SBVA PRE REF: 95.7 %
DLCOC SBVA REF: 5.71
DLCOC SINGLE BREATH LLN: 21.51
DLCOC SINGLE BREATH PRE REF: 96.6 %
DLCOC SINGLE BREATH REF: 27.24
DLCOCSBVAULN: 7.4
DLCOCSINGLEBREATHULN: 32.97
DLCOSINGLEBREATHULN: 32.97
DLCOVA LLN: 4.02
DLCOVA PRE REF: 94.2 %
DLCOVA PRE: 5.38 ML/(MIN*MMHG*L) (ref 4.02–7.4)
DLCOVA REF: 5.71
DLCOVAULN: 7.4
DLVAADJ PRE: 5.47 ML/(MIN*MMHG*L) (ref 4.02–7.4)
FEF 25 75 LLN: 2.49
FEF 25 75 PRE REF: 73.9 %
FEF 25 75 REF: 3.75
FEV05 LLN: 1.46
FEV05 REF: 2.31
FEV1 FVC LLN: 76
FEV1 FVC PRE REF: 89.9 %
FEV1 FVC REF: 87
FEV1 LLN: 2.57
FEV1 PRE REF: 96.3 %
FEV1 REF: 3.19
FVC LLN: 2.94
FVC PRE REF: 106.5 %
FVC REF: 3.68
IVC PRE: 3.81 L (ref 2.94–4.42)
IVC SINGLE BREATH LLN: 2.94
IVC SINGLE BREATH PRE REF: 103.6 %
IVC SINGLE BREATH REF: 3.68
IVCSINGLEBREATHULN: 4.42
PEF LLN: 5.08
PEF PRE REF: 98.8 %
PEF REF: 6.74
PRE DLCO: 25.91 ML/(MIN*MMHG) (ref 21.51–32.97)
PRE FEF 25 75: 2.78 L/S (ref 2.49–5.02)
PRE FET 100: 6.96 SEC
PRE FEV05 REF: 101.9 %
PRE FEV1 FVC: 78.42 % (ref 75.57–98.91)
PRE FEV1: 3.08 L (ref 2.57–3.82)
PRE FEV5: 2.36 L (ref 1.46–3.17)
PRE FVC: 3.92 L (ref 2.94–4.42)
PRE PEF: 6.66 L/S (ref 5.08–8.4)
VA PRE: 4.82 L (ref 4.62–4.62)
VA SINGLE BREATH LLN: 4.62
VA SINGLE BREATH PRE REF: 104.2 %
VA SINGLE BREATH REF: 4.62
VASINGLEBREATHULN: 4.62

## 2019-11-18 PROCEDURE — 99999 PR PBB SHADOW E&M-EST. PATIENT-LVL III: CPT | Mod: PBBFAC,,, | Performed by: INTERNAL MEDICINE

## 2019-11-18 PROCEDURE — 94729 PR C02/MEMBANE DIFFUSE CAPACITY: ICD-10-PCS | Mod: S$GLB,,, | Performed by: INTERNAL MEDICINE

## 2019-11-18 PROCEDURE — 94010 BREATHING CAPACITY TEST: CPT | Mod: S$GLB,,, | Performed by: INTERNAL MEDICINE

## 2019-11-18 PROCEDURE — 99213 PR OFFICE/OUTPT VISIT, EST, LEVL III, 20-29 MIN: ICD-10-PCS | Mod: 25,S$GLB,, | Performed by: INTERNAL MEDICINE

## 2019-11-18 PROCEDURE — 94618 PULMONARY STRESS TESTING: CPT | Mod: S$GLB,,, | Performed by: INTERNAL MEDICINE

## 2019-11-18 PROCEDURE — 94729 DIFFUSING CAPACITY: CPT | Mod: S$GLB,,, | Performed by: INTERNAL MEDICINE

## 2019-11-18 PROCEDURE — 99213 OFFICE O/P EST LOW 20 MIN: CPT | Mod: 25,S$GLB,, | Performed by: INTERNAL MEDICINE

## 2019-11-18 PROCEDURE — 94618 PULMONARY STRESS TESTING: ICD-10-PCS | Mod: S$GLB,,, | Performed by: INTERNAL MEDICINE

## 2019-11-18 PROCEDURE — 3008F PR BODY MASS INDEX (BMI) DOCUMENTED: ICD-10-PCS | Mod: CPTII,S$GLB,, | Performed by: INTERNAL MEDICINE

## 2019-11-18 PROCEDURE — 99999 PR PBB SHADOW E&M-EST. PATIENT-LVL III: ICD-10-PCS | Mod: PBBFAC,,, | Performed by: INTERNAL MEDICINE

## 2019-11-18 PROCEDURE — 3008F BODY MASS INDEX DOCD: CPT | Mod: CPTII,S$GLB,, | Performed by: INTERNAL MEDICINE

## 2019-11-18 PROCEDURE — 94010 BREATHING CAPACITY TEST: ICD-10-PCS | Mod: S$GLB,,, | Performed by: INTERNAL MEDICINE

## 2019-11-18 NOTE — PROGRESS NOTES
"Subjective:       Patient ID: Madeline Collette Evans is a 24 y.o. female.    Chief Complaint: Shortness of Breath    24 year old with a history of histoplasmosis that presented as mediastinal adenopathy that was treated with itraconazole and prednisone and had a flair one year later with esophageal spasm.  Patient with chest pain when she takes a deep breath.  Does not have these symptoms with exercise.  Sternal type chest pain on inspiration that is worse with inactivity.  Pain began in August, has become worse but is not consistent.      Review of Systems   HENT: Negative for trouble swallowing.    Respiratory: Negative for cough, chest tightness, shortness of breath, wheezing and dyspnea on extertion.    Skin: Negative for rash.   Gastrointestinal: Negative for acid reflux.       Objective:       Vitals:    11/18/19 0847   BP: 118/69   Pulse: 86   SpO2: 99%   Weight: 54.9 kg (121 lb)   Height: 5' 3" (1.6 m)     Physical Exam   Constitutional: She is oriented to person, place, and time. She appears well-developed and well-nourished.   Cardiovascular: Normal rate and regular rhythm.   Pulmonary/Chest: Normal expansion, hyperinflation, symmetric chest wall expansion, effort normal and breath sounds normal. She has no decreased breath sounds. She exhibits no tenderness.   Musculoskeletal: Normal range of motion. She exhibits no edema.   Neurological: She is alert and oriented to person, place, and time.   Skin: Skin is warm and dry.   Psychiatric: She has a normal mood and affect.        Personal Diagnostic Review  CT of chest performed on 8/2019  without contrast revealed resolution of previously identified mediastinal lymph nodes.  Trachea is patent    PFTs without obstruction normal spirometry and lung volumes. NOrmal oxygenation on 6MWT>.  No flowsheet data found.      Assessment:       1. Pain of anterior chest wall with respiration    2. Mediastinal lymphadenopathy    3. History of histoplasmosis      "   Outpatient Encounter Medications as of 11/18/2019   Medication Sig Dispense Refill    ACZONE 5 % topical gel Apply topically every morning. 60 g 3    doxycycline (MONODOX) 50 MG Cap Take 1 capsule (50 mg total) by mouth every 12 (twelve) hours. 60 capsule 2    FLUoxetine 20 MG capsule Take 1 capsule (20 mg total) by mouth once daily. 30 capsule 2    metronidazole 1% (METROGEL) 1 % Gel AAA face qday 60 g 6    multivitamin (THERAGRAN) per tablet Take 1 tablet by mouth once daily.      norethindrone-ethinyl estradiol (OVCON) 0.4-35 mg-mcg per tablet Take 1 tablet by mouth once daily. 90 tablet 3    ondansetron (ZOFRAN) 4 MG tablet Take 1 tablet (4 mg total) by mouth every 6 (six) hours. 12 tablet 0    sulfacetamide sodium-sulfur (SULFACLEANSE 8-4) 8-4 % Susp Wash face qd - bid 1 Bottle 5    tazarotene (TAZORAC) 0.05 % Crea cream Apply topically every evening. Apply thin film to face qhs after moisturizing. 60 g 1    tazarotene (TAZORAC) 0.05 % Crea cream Apply topically every evening. Apply thin film to face qhs then moisturize 60 g 1     No facility-administered encounter medications on file as of 11/18/2019.      No orders of the defined types were placed in this encounter.    Plan:     Problem List Items Addressed This Visit     Mediastinal lymphadenopathy    Overview     Secondary to primary histoplasmosis           Pain of anterior chest wall with respiration - Primary    Overview     May be related to post thorascope although not consistent.   Most likely costochondritis  No primary pulmonary etiology as airways, lungs and PFTs are within normal limits  No exercise intolerance.           Current Assessment & Plan     NSAIDs as needed  Provided reassurance.         History of histoplasmosis

## 2019-11-18 NOTE — PROCEDURES
Madeline Collette Evans is a 24 y.o.  female patient, who presents for a 6 minute walk test ordered by MD Julio.  The diagnosis is Shortness of Breath.  The patient's BMI is 21.4 kg/m2.  Predicted distance (lower limit of normal) is 610.37 meters.      Test Results:    The test was completed without stopping. The total time walked was 360 seconds.  During walking, the patient reported:  No complaints. The patient used no assistive devices  during testing.     11/18/2019---------Distance: 518.16 meters (1700 feet)     O2 Sat % Supplemental Oxygen Heart Rate Blood Pressure Dino Scale   Pre-exercise  (Resting) 98 % Room Air 93 bpm 115/64 mmHg 0   During Exercise 100 % Room Air 95 bpm 118/69 mmHg 2   Post-exercise  (Recovery) 99 % Room Air  86 bpm   mmHg       Recovery Time: 46 seconds    Performing nurse/tech: Mac GARZA      PREVIOUS STUDY:   The patient has not had a previous study.      CLINICAL INTERPRETATION:  Six minute walk distance is 518.16 meters (1700 feet) with light dyspnea.  During exercise, there was no significant desaturation while breathing room air.  Both blood pressure and heart rate remained stable with walking.  The patient did not report non-pulmonary symptoms during exercise.  No previous study performed.  Based upon age and body mass index, exercise capacity is less than predicted.

## 2019-11-25 ENCOUNTER — PATIENT MESSAGE (OUTPATIENT)
Dept: OBSTETRICS AND GYNECOLOGY | Facility: CLINIC | Age: 24
End: 2019-11-25

## 2019-11-25 ENCOUNTER — OFFICE VISIT (OUTPATIENT)
Dept: OBSTETRICS AND GYNECOLOGY | Facility: CLINIC | Age: 24
End: 2019-11-25
Payer: COMMERCIAL

## 2019-11-25 VITALS
SYSTOLIC BLOOD PRESSURE: 110 MMHG | DIASTOLIC BLOOD PRESSURE: 70 MMHG | HEIGHT: 63 IN | BODY MASS INDEX: 21.48 KG/M2 | WEIGHT: 121.25 LBS

## 2019-11-25 DIAGNOSIS — Z12.4 PAP SMEAR FOR CERVICAL CANCER SCREENING: ICD-10-CM

## 2019-11-25 DIAGNOSIS — N76.0 ACUTE VAGINITIS: Primary | ICD-10-CM

## 2019-11-25 PROCEDURE — 3008F BODY MASS INDEX DOCD: CPT | Mod: CPTII,S$GLB,, | Performed by: NURSE PRACTITIONER

## 2019-11-25 PROCEDURE — 99999 PR PBB SHADOW E&M-EST. PATIENT-LVL III: ICD-10-PCS | Mod: PBBFAC,,, | Performed by: NURSE PRACTITIONER

## 2019-11-25 PROCEDURE — 87661 TRICHOMONAS VAGINALIS AMPLIF: CPT

## 2019-11-25 PROCEDURE — 99213 PR OFFICE/OUTPT VISIT, EST, LEVL III, 20-29 MIN: ICD-10-PCS | Mod: S$GLB,,, | Performed by: NURSE PRACTITIONER

## 2019-11-25 PROCEDURE — 99999 PR PBB SHADOW E&M-EST. PATIENT-LVL III: CPT | Mod: PBBFAC,,, | Performed by: NURSE PRACTITIONER

## 2019-11-25 PROCEDURE — 87491 CHLMYD TRACH DNA AMP PROBE: CPT

## 2019-11-25 PROCEDURE — 88175 CYTOPATH C/V AUTO FLUID REDO: CPT

## 2019-11-25 PROCEDURE — 3008F PR BODY MASS INDEX (BMI) DOCUMENTED: ICD-10-PCS | Mod: CPTII,S$GLB,, | Performed by: NURSE PRACTITIONER

## 2019-11-25 PROCEDURE — 99213 OFFICE O/P EST LOW 20 MIN: CPT | Mod: S$GLB,,, | Performed by: NURSE PRACTITIONER

## 2019-11-25 NOTE — PROGRESS NOTES
CC: Vaginal Discharge    Madeline Collette Evans is a 24 y.o. female No obstetric history on file. presents with complaint of vaginal itching and discharge for 3 days.  She reports itching.  She denies odor.  She states the discharge is white and gelatinous.  Alleviating factors: Monistat 3 with mild relief. No new sexual partners. Desires STD testing. Pap smear performed in May 2019 with inconclusive result and need for repeat. She is a runner.      ROS:  GENERAL: No fever, chills, fatigability or weight loss.  VULVAR: No pain, no lesions and no itching.  VAGINAL: No relaxation, itching and discharge, no abnormal bleeding and no lesions.  ABDOMEN: No abdominal pain. Denies nausea. Denies vomiting. No diarrhea. No constipation  BREAST: Denies pain. No lumps. No discharge.  URINARY: No incontinence, no nocturia, no frequency and no dysuria.  CARDIOVASCULAR: No chest pain. No shortness of breath. No leg cramps.  NEUROLOGICAL: No headaches. No vision changes.    PHYSICAL EXAM:  VULVA: normal appearing vulva with no masses, tenderness or lesions   VAGINA: normal appearing vagina with normal color and small amount of thin, white discharge, no lesions   CERVIX: normal appearing cervix without discharge or lesions   UTERUS: uterus is normal size, shape, consistency and nontender   ADNEXA: normal adnexa in size, nontender and no masses    ASSESSMENT and PLAN:    ICD-10-CM ICD-9-CM    1. Acute vaginitis N76.0 616.10 Vaginosis Screen by DNA Probe      C. trachomatis/N. gonorrhoeae by AMP DNA [facility]; Cervicovaginal   2. Pap smear for cervical cancer screening Z12.4 V76.2 Liquid-Based Pap Smear, Screening     Affirm/GCCT  Will prescribe Metrogel if BV+.    Patient was counseled today on vaginitis prevention including :  a. avoiding feminine products such as deoderant soaps, body wash, bubble bath, douches, scented toilet paper, deoderant tampons or pads, feminine wipes, chronic pad use, etc.  b. avoiding other vulvovaginal  irritants such as long hot baths, humidity, tight, synthetic clothing, chlorine and sitting around in wet bathing suits  c. wearing cotton underwear, avoiding thong underwear and no underwear to bed  d. taking showers instead of baths and use a hair dryer on cool setting afterwards to dry  e. wearing cotton to exercise and shower immediately after exercise and change clothes  f. using polyurethane condoms without spermicide if sexually active and symptoms are triggered by intercourse    FOLLOW UP: PRN lack of improvement.

## 2019-11-26 ENCOUNTER — PATIENT MESSAGE (OUTPATIENT)
Dept: OBSTETRICS AND GYNECOLOGY | Facility: CLINIC | Age: 24
End: 2019-11-26

## 2019-11-26 LAB
C TRACH DNA SPEC QL NAA+PROBE: NOT DETECTED
N GONORRHOEA DNA SPEC QL NAA+PROBE: NOT DETECTED

## 2019-11-27 LAB
BACTERIAL VAGINOSIS DNA: NORMAL
CANDIDA RRNA VAG QL PROBE: NEGATIVE
G VAGINALIS RRNA GENITAL QL PROBE: NEGATIVE
T VAGINALIS RRNA GENITAL QL PROBE: NEGATIVE

## 2019-12-07 LAB
FINAL PATHOLOGIC DIAGNOSIS: NORMAL
Lab: NORMAL

## 2020-02-10 RX ORDER — NORETHINDRONE AND ETHINYL ESTRADIOL 0.4-0.035
KIT ORAL
Qty: 84 TABLET | Refills: 6 | Status: SHIPPED | OUTPATIENT
Start: 2020-02-10 | End: 2021-04-08

## 2020-06-24 ENCOUNTER — TELEPHONE (OUTPATIENT)
Dept: INTERNAL MEDICINE | Facility: CLINIC | Age: 25
End: 2020-06-24

## 2020-06-24 DIAGNOSIS — Z00.00 PREVENTATIVE HEALTH CARE: Primary | ICD-10-CM

## 2020-06-24 NOTE — TELEPHONE ENCOUNTER
----- Message from Leigh Ann Jones sent at 6/24/2020  1:09 PM CDT -----  Contact: Patient [phone]  Patient needs to schedule a TB test.    Please call and advise.    Thank You

## 2020-06-26 ENCOUNTER — TELEPHONE (OUTPATIENT)
Dept: INTERNAL MEDICINE | Facility: CLINIC | Age: 25
End: 2020-06-26

## 2020-06-26 NOTE — TELEPHONE ENCOUNTER
----- Message from Makenzie Rand sent at 6/26/2020  9:33 AM CDT -----  Contact: [phone]  Patient called requesting orders for a skin TB test. Please call and advise

## 2020-06-29 ENCOUNTER — CLINICAL SUPPORT (OUTPATIENT)
Dept: INTERNAL MEDICINE | Facility: CLINIC | Age: 25
End: 2020-06-29
Payer: COMMERCIAL

## 2020-06-29 PROCEDURE — 86580 TB INTRADERMAL TEST: CPT | Mod: S$GLB,,, | Performed by: INTERNAL MEDICINE

## 2020-06-29 PROCEDURE — 86580 POCT TB SKIN TEST: ICD-10-PCS | Mod: S$GLB,,, | Performed by: INTERNAL MEDICINE

## 2020-06-29 NOTE — PROGRESS NOTES
TB injection administered in Left arm.   TB read appt scheduled for Wednesday.     Lyn Lama LPN administered injection. Does not have access to chart.

## 2020-07-01 LAB
TB INDURATION - 48 HR READ: 0 MM
TB INDURATION - 72 HR READ: NORMAL
TB SKIN TEST - 48 HR READ: NEGATIVE
TB SKIN TEST - 72 HR READ: NORMAL

## 2020-09-21 ENCOUNTER — PATIENT MESSAGE (OUTPATIENT)
Dept: DERMATOLOGY | Facility: CLINIC | Age: 25
End: 2020-09-21

## 2020-10-05 ENCOUNTER — PATIENT MESSAGE (OUTPATIENT)
Dept: ADMINISTRATIVE | Facility: HOSPITAL | Age: 25
End: 2020-10-05

## 2020-11-19 ENCOUNTER — IMMUNIZATION (OUTPATIENT)
Dept: INTERNAL MEDICINE | Facility: CLINIC | Age: 25
End: 2020-11-19
Payer: COMMERCIAL

## 2020-11-19 PROCEDURE — 90686 IIV4 VACC NO PRSV 0.5 ML IM: CPT | Mod: S$GLB,,, | Performed by: INTERNAL MEDICINE

## 2020-11-19 PROCEDURE — 90471 FLU VACCINE (QUAD) GREATER THAN OR EQUAL TO 3YO PRESERVATIVE FREE IM: ICD-10-PCS | Mod: S$GLB,,, | Performed by: INTERNAL MEDICINE

## 2020-11-19 PROCEDURE — 90471 IMMUNIZATION ADMIN: CPT | Mod: S$GLB,,, | Performed by: INTERNAL MEDICINE

## 2020-11-19 PROCEDURE — 90686 FLU VACCINE (QUAD) GREATER THAN OR EQUAL TO 3YO PRESERVATIVE FREE IM: ICD-10-PCS | Mod: S$GLB,,, | Performed by: INTERNAL MEDICINE

## 2020-12-24 ENCOUNTER — OFFICE VISIT (OUTPATIENT)
Dept: URGENT CARE | Facility: CLINIC | Age: 25
End: 2020-12-24
Payer: COMMERCIAL

## 2020-12-24 VITALS
OXYGEN SATURATION: 99 % | TEMPERATURE: 97 F | RESPIRATION RATE: 18 BRPM | BODY MASS INDEX: 21.26 KG/M2 | DIASTOLIC BLOOD PRESSURE: 81 MMHG | WEIGHT: 120 LBS | HEIGHT: 63 IN | SYSTOLIC BLOOD PRESSURE: 126 MMHG | HEART RATE: 76 BPM

## 2020-12-24 DIAGNOSIS — J06.9 UPPER RESPIRATORY TRACT INFECTION, UNSPECIFIED TYPE: Primary | ICD-10-CM

## 2020-12-24 DIAGNOSIS — Z20.822 EXPOSURE TO COVID-19 VIRUS: ICD-10-CM

## 2020-12-24 LAB
CTP QC/QA: YES
SARS-COV-2 RDRP RESP QL NAA+PROBE: NEGATIVE

## 2020-12-24 PROCEDURE — U0002: ICD-10-PCS | Mod: QW,S$GLB,, | Performed by: FAMILY MEDICINE

## 2020-12-24 PROCEDURE — U0002 COVID-19 LAB TEST NON-CDC: HCPCS | Mod: QW,S$GLB,, | Performed by: FAMILY MEDICINE

## 2020-12-24 PROCEDURE — 99213 PR OFFICE/OUTPT VISIT, EST, LEVL III, 20-29 MIN: ICD-10-PCS | Mod: S$GLB,CS,, | Performed by: FAMILY MEDICINE

## 2020-12-24 PROCEDURE — 3008F PR BODY MASS INDEX (BMI) DOCUMENTED: ICD-10-PCS | Mod: CPTII,S$GLB,, | Performed by: FAMILY MEDICINE

## 2020-12-24 PROCEDURE — 3008F BODY MASS INDEX DOCD: CPT | Mod: CPTII,S$GLB,, | Performed by: FAMILY MEDICINE

## 2020-12-24 PROCEDURE — 99213 OFFICE O/P EST LOW 20 MIN: CPT | Mod: S$GLB,CS,, | Performed by: FAMILY MEDICINE

## 2020-12-24 NOTE — PATIENT INSTRUCTIONS
CDC Testing and Quarantine Guidelines for Exposure:  A close exposure is defined as anyone who had a masked or an unmasked exposure to a known COVID -19 positive person, at less than 6 ft for more than 15 minutes. If your exposure meets this definition, then you are required to quarantine for 7 days per the CDC. They now recommend that a test can be performed if you are asymptomatic (someone who does not have any symptoms), and a test should be done if you develop symptoms after an exposure as described above.    If you meet the definition of a close exposure, it does not matter whether or not you are asymptomatic or symptomatic - A NEGATIVE TEST DOES NOT GET YOU OUT OF 7 DAYS OF QUARANTINE!    Please note that if you are asymptomatic and wait more than 4 days to test after an exposure, you risk lengthening your quarantine. This is because if you test positive as an asymptomatic, your isolation is 10 days from the date of the positive test, not the date of exposure. So for example, if you test positive as an asymptomatic on day 7 from exposure, you have now extended your 14 day quarantine to a 17 day isolation.    If your exposure does not meet the above definition, you may return to your normal activities including social distancing, wearing masks, and frequent handwashing.

## 2020-12-30 ENCOUNTER — IMMUNIZATION (OUTPATIENT)
Dept: INTERNAL MEDICINE | Facility: CLINIC | Age: 25
End: 2020-12-30
Payer: COMMERCIAL

## 2020-12-30 DIAGNOSIS — Z23 NEED FOR VACCINATION: ICD-10-CM

## 2020-12-30 PROCEDURE — 91300 COVID-19, MRNA, LNP-S, PF, 30 MCG/0.3 ML DOSE VACCINE: CPT | Mod: ,,, | Performed by: INTERNAL MEDICINE

## 2020-12-30 PROCEDURE — 0001A COVID-19, MRNA, LNP-S, PF, 30 MCG/0.3 ML DOSE VACCINE: CPT | Mod: CV19,,, | Performed by: INTERNAL MEDICINE

## 2020-12-30 PROCEDURE — 91300 COVID-19, MRNA, LNP-S, PF, 30 MCG/0.3 ML DOSE VACCINE: ICD-10-PCS | Mod: ,,, | Performed by: INTERNAL MEDICINE

## 2020-12-30 PROCEDURE — 0001A COVID-19, MRNA, LNP-S, PF, 30 MCG/0.3 ML DOSE VACCINE: ICD-10-PCS | Mod: CV19,,, | Performed by: INTERNAL MEDICINE

## 2021-01-04 ENCOUNTER — PATIENT MESSAGE (OUTPATIENT)
Dept: ADMINISTRATIVE | Facility: HOSPITAL | Age: 26
End: 2021-01-04

## 2021-01-20 ENCOUNTER — IMMUNIZATION (OUTPATIENT)
Dept: INTERNAL MEDICINE | Facility: CLINIC | Age: 26
End: 2021-01-20
Payer: COMMERCIAL

## 2021-01-20 DIAGNOSIS — Z23 NEED FOR VACCINATION: Primary | ICD-10-CM

## 2021-01-20 PROCEDURE — 0002A COVID-19, MRNA, LNP-S, PF, 30 MCG/0.3 ML DOSE VACCINE: CPT | Mod: PBBFAC | Performed by: INTERNAL MEDICINE

## 2021-01-20 PROCEDURE — 91300 COVID-19, MRNA, LNP-S, PF, 30 MCG/0.3 ML DOSE VACCINE: CPT | Mod: PBBFAC | Performed by: INTERNAL MEDICINE

## 2021-01-24 ENCOUNTER — PATIENT MESSAGE (OUTPATIENT)
Dept: INTERNAL MEDICINE | Facility: CLINIC | Age: 26
End: 2021-01-24

## 2021-01-25 ENCOUNTER — LAB VISIT (OUTPATIENT)
Dept: LAB | Facility: HOSPITAL | Age: 26
End: 2021-01-25
Attending: INTERNAL MEDICINE
Payer: COMMERCIAL

## 2021-01-25 ENCOUNTER — OFFICE VISIT (OUTPATIENT)
Dept: INTERNAL MEDICINE | Facility: CLINIC | Age: 26
End: 2021-01-25
Payer: COMMERCIAL

## 2021-01-25 VITALS
SYSTOLIC BLOOD PRESSURE: 116 MMHG | WEIGHT: 124.13 LBS | HEART RATE: 67 BPM | BODY MASS INDEX: 21.99 KG/M2 | HEIGHT: 63 IN | DIASTOLIC BLOOD PRESSURE: 74 MMHG

## 2021-01-25 DIAGNOSIS — R00.9 ABNORMAL HEART RATE: ICD-10-CM

## 2021-01-25 DIAGNOSIS — E53.8 B12 DEFICIENCY: ICD-10-CM

## 2021-01-25 DIAGNOSIS — Z00.00 ANNUAL PHYSICAL EXAM: ICD-10-CM

## 2021-01-25 DIAGNOSIS — F41.9 ANXIETY: ICD-10-CM

## 2021-01-25 DIAGNOSIS — E61.1 IRON DEFICIENCY: ICD-10-CM

## 2021-01-25 DIAGNOSIS — Z00.00 ANNUAL PHYSICAL EXAM: Primary | ICD-10-CM

## 2021-01-25 DIAGNOSIS — B36.0 TINEA VERSICOLOR: ICD-10-CM

## 2021-01-25 LAB
ALBUMIN SERPL BCP-MCNC: 4.1 G/DL (ref 3.5–5.2)
ALP SERPL-CCNC: 52 U/L (ref 55–135)
ALT SERPL W/O P-5'-P-CCNC: 18 U/L (ref 10–44)
ANION GAP SERPL CALC-SCNC: 11 MMOL/L (ref 8–16)
AST SERPL-CCNC: 32 U/L (ref 10–40)
BASOPHILS # BLD AUTO: 0.05 K/UL (ref 0–0.2)
BASOPHILS NFR BLD: 0.6 % (ref 0–1.9)
BILIRUB SERPL-MCNC: 0.4 MG/DL (ref 0.1–1)
BUN SERPL-MCNC: 22 MG/DL (ref 6–20)
CALCIUM SERPL-MCNC: 9.5 MG/DL (ref 8.7–10.5)
CHLORIDE SERPL-SCNC: 102 MMOL/L (ref 95–110)
CHOLEST SERPL-MCNC: 194 MG/DL (ref 120–199)
CHOLEST/HDLC SERPL: 1.8 {RATIO} (ref 2–5)
CO2 SERPL-SCNC: 26 MMOL/L (ref 23–29)
CREAT SERPL-MCNC: 0.7 MG/DL (ref 0.5–1.4)
DIFFERENTIAL METHOD: NORMAL
EOSINOPHIL # BLD AUTO: 0 K/UL (ref 0–0.5)
EOSINOPHIL NFR BLD: 0.5 % (ref 0–8)
ERYTHROCYTE [DISTWIDTH] IN BLOOD BY AUTOMATED COUNT: 12.8 % (ref 11.5–14.5)
EST. GFR  (AFRICAN AMERICAN): >60 ML/MIN/1.73 M^2
EST. GFR  (NON AFRICAN AMERICAN): >60 ML/MIN/1.73 M^2
ESTIMATED AVG GLUCOSE: 94 MG/DL (ref 68–131)
FERRITIN SERPL-MCNC: 45 NG/ML (ref 20–300)
GLUCOSE SERPL-MCNC: 93 MG/DL (ref 70–110)
HBA1C MFR BLD HPLC: 4.9 % (ref 4–5.6)
HCT VFR BLD AUTO: 39.5 % (ref 37–48.5)
HDLC SERPL-MCNC: 108 MG/DL (ref 40–75)
HDLC SERPL: 55.7 % (ref 20–50)
HGB BLD-MCNC: 12.8 G/DL (ref 12–16)
IMM GRANULOCYTES # BLD AUTO: 0.01 K/UL (ref 0–0.04)
IMM GRANULOCYTES NFR BLD AUTO: 0.1 % (ref 0–0.5)
IRON SERPL-MCNC: 122 UG/DL (ref 30–160)
LDLC SERPL CALC-MCNC: 67.2 MG/DL (ref 63–159)
LYMPHOCYTES # BLD AUTO: 1.7 K/UL (ref 1–4.8)
LYMPHOCYTES NFR BLD: 22.3 % (ref 18–48)
MCH RBC QN AUTO: 30.3 PG (ref 27–31)
MCHC RBC AUTO-ENTMCNC: 32.4 G/DL (ref 32–36)
MCV RBC AUTO: 94 FL (ref 82–98)
MONOCYTES # BLD AUTO: 0.4 K/UL (ref 0.3–1)
MONOCYTES NFR BLD: 5.2 % (ref 4–15)
NEUTROPHILS # BLD AUTO: 5.5 K/UL (ref 1.8–7.7)
NEUTROPHILS NFR BLD: 71.3 % (ref 38–73)
NONHDLC SERPL-MCNC: 86 MG/DL
NRBC BLD-RTO: 0 /100 WBC
PLATELET # BLD AUTO: 264 K/UL (ref 150–350)
PMV BLD AUTO: 11 FL (ref 9.2–12.9)
POTASSIUM SERPL-SCNC: 3.8 MMOL/L (ref 3.5–5.1)
PROT SERPL-MCNC: 7.3 G/DL (ref 6–8.4)
RBC # BLD AUTO: 4.22 M/UL (ref 4–5.4)
SATURATED IRON: 22 % (ref 20–50)
SODIUM SERPL-SCNC: 139 MMOL/L (ref 136–145)
TOTAL IRON BINDING CAPACITY: 561 UG/DL (ref 250–450)
TRANSFERRIN SERPL-MCNC: 379 MG/DL (ref 200–375)
TRIGL SERPL-MCNC: 94 MG/DL (ref 30–150)
TSH SERPL DL<=0.005 MIU/L-ACNC: 1.18 UIU/ML (ref 0.4–4)
VIT B12 SERPL-MCNC: 252 PG/ML (ref 210–950)
WBC # BLD AUTO: 7.71 K/UL (ref 3.9–12.7)

## 2021-01-25 PROCEDURE — 86803 HEPATITIS C AB TEST: CPT

## 2021-01-25 PROCEDURE — 93010 EKG 12-LEAD: ICD-10-PCS | Mod: S$GLB,,, | Performed by: INTERNAL MEDICINE

## 2021-01-25 PROCEDURE — 80061 LIPID PANEL: CPT

## 2021-01-25 PROCEDURE — 99999 PR PBB SHADOW E&M-EST. PATIENT-LVL III: ICD-10-PCS | Mod: PBBFAC,,, | Performed by: INTERNAL MEDICINE

## 2021-01-25 PROCEDURE — 1126F AMNT PAIN NOTED NONE PRSNT: CPT | Mod: S$GLB,,, | Performed by: INTERNAL MEDICINE

## 2021-01-25 PROCEDURE — 93005 EKG 12-LEAD: ICD-10-PCS | Mod: S$GLB,,, | Performed by: INTERNAL MEDICINE

## 2021-01-25 PROCEDURE — 3008F PR BODY MASS INDEX (BMI) DOCUMENTED: ICD-10-PCS | Mod: CPTII,S$GLB,, | Performed by: INTERNAL MEDICINE

## 2021-01-25 PROCEDURE — 85025 COMPLETE CBC W/AUTO DIFF WBC: CPT

## 2021-01-25 PROCEDURE — 93010 ELECTROCARDIOGRAM REPORT: CPT | Mod: S$GLB,,, | Performed by: INTERNAL MEDICINE

## 2021-01-25 PROCEDURE — 3008F BODY MASS INDEX DOCD: CPT | Mod: CPTII,S$GLB,, | Performed by: INTERNAL MEDICINE

## 2021-01-25 PROCEDURE — 36415 COLL VENOUS BLD VENIPUNCTURE: CPT

## 2021-01-25 PROCEDURE — 84443 ASSAY THYROID STIM HORMONE: CPT

## 2021-01-25 PROCEDURE — 93005 ELECTROCARDIOGRAM TRACING: CPT | Mod: S$GLB,,, | Performed by: INTERNAL MEDICINE

## 2021-01-25 PROCEDURE — 99395 PR PREVENTIVE VISIT,EST,18-39: ICD-10-PCS | Mod: S$GLB,,, | Performed by: INTERNAL MEDICINE

## 2021-01-25 PROCEDURE — 1126F PR PAIN SEVERITY QUANTIFIED, NO PAIN PRESENT: ICD-10-PCS | Mod: S$GLB,,, | Performed by: INTERNAL MEDICINE

## 2021-01-25 PROCEDURE — 99395 PREV VISIT EST AGE 18-39: CPT | Mod: S$GLB,,, | Performed by: INTERNAL MEDICINE

## 2021-01-25 PROCEDURE — 80053 COMPREHEN METABOLIC PANEL: CPT

## 2021-01-25 PROCEDURE — 99999 PR PBB SHADOW E&M-EST. PATIENT-LVL III: CPT | Mod: PBBFAC,,, | Performed by: INTERNAL MEDICINE

## 2021-01-25 PROCEDURE — 82728 ASSAY OF FERRITIN: CPT

## 2021-01-25 PROCEDURE — 82607 VITAMIN B-12: CPT

## 2021-01-25 PROCEDURE — 83540 ASSAY OF IRON: CPT

## 2021-01-25 PROCEDURE — 83036 HEMOGLOBIN GLYCOSYLATED A1C: CPT

## 2021-01-25 RX ORDER — FLUOXETINE HYDROCHLORIDE 20 MG/1
20 CAPSULE ORAL DAILY
Qty: 30 CAPSULE | Refills: 11 | Status: SHIPPED | OUTPATIENT
Start: 2021-01-25

## 2021-01-25 RX ORDER — KETOCONAZOLE 20 MG/ML
SHAMPOO, SUSPENSION TOPICAL
Qty: 120 ML | Refills: 0 | Status: SHIPPED | OUTPATIENT
Start: 2021-01-25 | End: 2021-02-17

## 2021-01-26 LAB — HCV AB SERPL QL IA: NEGATIVE

## 2021-02-01 ENCOUNTER — PATIENT MESSAGE (OUTPATIENT)
Dept: INTERNAL MEDICINE | Facility: CLINIC | Age: 26
End: 2021-02-01

## 2021-02-02 ENCOUNTER — PATIENT MESSAGE (OUTPATIENT)
Dept: INTERNAL MEDICINE | Facility: CLINIC | Age: 26
End: 2021-02-02

## 2021-04-14 ENCOUNTER — TELEPHONE (OUTPATIENT)
Dept: INTERNAL MEDICINE | Facility: CLINIC | Age: 26
End: 2021-04-14

## 2021-04-14 ENCOUNTER — PATIENT MESSAGE (OUTPATIENT)
Dept: DERMATOLOGY | Facility: CLINIC | Age: 26
End: 2021-04-14

## 2021-04-14 ENCOUNTER — PATIENT MESSAGE (OUTPATIENT)
Dept: INTERNAL MEDICINE | Facility: CLINIC | Age: 26
End: 2021-04-14

## 2021-10-12 ENCOUNTER — IMMUNIZATION (OUTPATIENT)
Dept: INTERNAL MEDICINE | Facility: CLINIC | Age: 26
End: 2021-10-12
Payer: COMMERCIAL

## 2021-10-12 PROCEDURE — 90471 FLU VACCINE (QUAD) GREATER THAN OR EQUAL TO 3YO PRESERVATIVE FREE IM: ICD-10-PCS | Mod: S$GLB,,, | Performed by: INTERNAL MEDICINE

## 2021-10-12 PROCEDURE — 90686 IIV4 VACC NO PRSV 0.5 ML IM: CPT | Mod: S$GLB,,, | Performed by: INTERNAL MEDICINE

## 2021-10-12 PROCEDURE — 90686 FLU VACCINE (QUAD) GREATER THAN OR EQUAL TO 3YO PRESERVATIVE FREE IM: ICD-10-PCS | Mod: S$GLB,,, | Performed by: INTERNAL MEDICINE

## 2021-10-12 PROCEDURE — 90471 IMMUNIZATION ADMIN: CPT | Mod: S$GLB,,, | Performed by: INTERNAL MEDICINE

## 2021-10-15 ENCOUNTER — OFFICE VISIT (OUTPATIENT)
Dept: INTERNAL MEDICINE | Facility: CLINIC | Age: 26
End: 2021-10-15
Payer: COMMERCIAL

## 2021-10-15 VITALS
DIASTOLIC BLOOD PRESSURE: 60 MMHG | HEIGHT: 63 IN | SYSTOLIC BLOOD PRESSURE: 115 MMHG | OXYGEN SATURATION: 99 % | WEIGHT: 120.38 LBS | HEART RATE: 76 BPM | BODY MASS INDEX: 21.33 KG/M2

## 2021-10-15 DIAGNOSIS — J02.9 SORE THROAT: Primary | ICD-10-CM

## 2021-10-15 LAB — GROUP A STREP, MOLECULAR: NEGATIVE

## 2021-10-15 PROCEDURE — 1160F PR REVIEW ALL MEDS BY PRESCRIBER/CLIN PHARMACIST DOCUMENTED: ICD-10-PCS | Mod: CPTII,S$GLB,, | Performed by: INTERNAL MEDICINE

## 2021-10-15 PROCEDURE — 3078F PR MOST RECENT DIASTOLIC BLOOD PRESSURE < 80 MM HG: ICD-10-PCS | Mod: CPTII,S$GLB,, | Performed by: INTERNAL MEDICINE

## 2021-10-15 PROCEDURE — 99213 PR OFFICE/OUTPT VISIT, EST, LEVL III, 20-29 MIN: ICD-10-PCS | Mod: S$GLB,,, | Performed by: INTERNAL MEDICINE

## 2021-10-15 PROCEDURE — 3008F PR BODY MASS INDEX (BMI) DOCUMENTED: ICD-10-PCS | Mod: CPTII,S$GLB,, | Performed by: INTERNAL MEDICINE

## 2021-10-15 PROCEDURE — 1160F RVW MEDS BY RX/DR IN RCRD: CPT | Mod: CPTII,S$GLB,, | Performed by: INTERNAL MEDICINE

## 2021-10-15 PROCEDURE — 99999 PR PBB SHADOW E&M-EST. PATIENT-LVL III: ICD-10-PCS | Mod: PBBFAC,,, | Performed by: INTERNAL MEDICINE

## 2021-10-15 PROCEDURE — 99213 OFFICE O/P EST LOW 20 MIN: CPT | Mod: S$GLB,,, | Performed by: INTERNAL MEDICINE

## 2021-10-15 PROCEDURE — 1159F PR MEDICATION LIST DOCUMENTED IN MEDICAL RECORD: ICD-10-PCS | Mod: CPTII,S$GLB,, | Performed by: INTERNAL MEDICINE

## 2021-10-15 PROCEDURE — 3078F DIAST BP <80 MM HG: CPT | Mod: CPTII,S$GLB,, | Performed by: INTERNAL MEDICINE

## 2021-10-15 PROCEDURE — 3074F PR MOST RECENT SYSTOLIC BLOOD PRESSURE < 130 MM HG: ICD-10-PCS | Mod: CPTII,S$GLB,, | Performed by: INTERNAL MEDICINE

## 2021-10-15 PROCEDURE — 87651 STREP A DNA AMP PROBE: CPT | Performed by: INTERNAL MEDICINE

## 2021-10-15 PROCEDURE — 3044F HG A1C LEVEL LT 7.0%: CPT | Mod: CPTII,S$GLB,, | Performed by: INTERNAL MEDICINE

## 2021-10-15 PROCEDURE — 3074F SYST BP LT 130 MM HG: CPT | Mod: CPTII,S$GLB,, | Performed by: INTERNAL MEDICINE

## 2021-10-15 PROCEDURE — 3044F PR MOST RECENT HEMOGLOBIN A1C LEVEL <7.0%: ICD-10-PCS | Mod: CPTII,S$GLB,, | Performed by: INTERNAL MEDICINE

## 2021-10-15 PROCEDURE — 1159F MED LIST DOCD IN RCRD: CPT | Mod: CPTII,S$GLB,, | Performed by: INTERNAL MEDICINE

## 2021-10-15 PROCEDURE — 3008F BODY MASS INDEX DOCD: CPT | Mod: CPTII,S$GLB,, | Performed by: INTERNAL MEDICINE

## 2021-10-15 PROCEDURE — 99999 PR PBB SHADOW E&M-EST. PATIENT-LVL III: CPT | Mod: PBBFAC,,, | Performed by: INTERNAL MEDICINE

## 2021-10-22 ENCOUNTER — PATIENT MESSAGE (OUTPATIENT)
Dept: DERMATOLOGY | Facility: CLINIC | Age: 26
End: 2021-10-22
Payer: COMMERCIAL

## 2021-10-26 ENCOUNTER — PATIENT MESSAGE (OUTPATIENT)
Dept: INTERNAL MEDICINE | Facility: CLINIC | Age: 26
End: 2021-10-26
Payer: COMMERCIAL

## 2021-11-22 ENCOUNTER — CLINICAL SUPPORT (OUTPATIENT)
Dept: INTERNAL MEDICINE | Facility: CLINIC | Age: 26
End: 2021-11-22
Payer: COMMERCIAL

## 2021-11-22 DIAGNOSIS — Z11.1 PPD SCREENING TEST: Primary | ICD-10-CM

## 2021-11-22 PROCEDURE — 86580 TB INTRADERMAL TEST: CPT | Mod: S$GLB,,, | Performed by: INTERNAL MEDICINE

## 2021-11-22 PROCEDURE — 86580 POCT TB SKIN TEST: ICD-10-PCS | Mod: S$GLB,,, | Performed by: INTERNAL MEDICINE

## 2021-11-24 ENCOUNTER — CLINICAL SUPPORT (OUTPATIENT)
Dept: INTERNAL MEDICINE | Facility: CLINIC | Age: 26
End: 2021-11-24
Payer: COMMERCIAL

## 2021-11-24 LAB
TB INDURATION - 48 HR READ: 0 MM
TB INDURATION - 72 HR READ: 0 MM
TB SKIN TEST - 48 HR READ: NEGATIVE
TB SKIN TEST - 72 HR READ: NEGATIVE

## 2022-02-17 ENCOUNTER — PATIENT MESSAGE (OUTPATIENT)
Dept: OBSTETRICS AND GYNECOLOGY | Facility: CLINIC | Age: 27
End: 2022-02-17
Payer: COMMERCIAL

## 2022-02-28 ENCOUNTER — PATIENT MESSAGE (OUTPATIENT)
Dept: OBSTETRICS AND GYNECOLOGY | Facility: CLINIC | Age: 27
End: 2022-02-28
Payer: COMMERCIAL

## 2022-03-08 ENCOUNTER — OFFICE VISIT (OUTPATIENT)
Dept: OBSTETRICS AND GYNECOLOGY | Facility: CLINIC | Age: 27
End: 2022-03-08
Payer: COMMERCIAL

## 2022-03-08 VITALS
HEIGHT: 63 IN | BODY MASS INDEX: 22.68 KG/M2 | WEIGHT: 128 LBS | DIASTOLIC BLOOD PRESSURE: 66 MMHG | SYSTOLIC BLOOD PRESSURE: 102 MMHG

## 2022-03-08 DIAGNOSIS — Z01.419 ENCOUNTER FOR ANNUAL ROUTINE GYNECOLOGICAL EXAMINATION: Primary | ICD-10-CM

## 2022-03-08 DIAGNOSIS — Z30.014 ENCOUNTER FOR INITIAL PRESCRIPTION OF INTRAUTERINE CONTRACEPTIVE DEVICE (IUD): ICD-10-CM

## 2022-03-08 DIAGNOSIS — Z11.3 SCREEN FOR STD (SEXUALLY TRANSMITTED DISEASE): ICD-10-CM

## 2022-03-08 DIAGNOSIS — Z12.39 BREAST CANCER SCREENING OTHER THAN MAMMOGRAM: ICD-10-CM

## 2022-03-08 DIAGNOSIS — Z30.09 ENCOUNTER FOR OTHER GENERAL COUNSELING OR ADVICE ON CONTRACEPTION: ICD-10-CM

## 2022-03-08 PROBLEM — R13.10 DYSPHAGIA: Status: RESOLVED | Noted: 2018-03-17 | Resolved: 2022-03-08

## 2022-03-08 PROBLEM — R59.0 MEDIASTINAL LYMPHADENOPATHY: Status: RESOLVED | Noted: 2017-05-11 | Resolved: 2022-03-08

## 2022-03-08 PROBLEM — R07.1 PAIN OF ANTERIOR CHEST WALL WITH RESPIRATION: Status: RESOLVED | Noted: 2019-11-18 | Resolved: 2022-03-08

## 2022-03-08 PROBLEM — Z86.19 HISTORY OF HISTOPLASMOSIS: Status: RESOLVED | Noted: 2019-11-18 | Resolved: 2022-03-08

## 2022-03-08 PROBLEM — R59.9 ADENOPATHY: Status: RESOLVED | Noted: 2017-05-18 | Resolved: 2022-03-08

## 2022-03-08 PROCEDURE — 87491 CHLMYD TRACH DNA AMP PROBE: CPT | Performed by: OBSTETRICS & GYNECOLOGY

## 2022-03-08 PROCEDURE — 3008F PR BODY MASS INDEX (BMI) DOCUMENTED: ICD-10-PCS | Mod: CPTII,S$GLB,, | Performed by: OBSTETRICS & GYNECOLOGY

## 2022-03-08 PROCEDURE — 99395 PR PREVENTIVE VISIT,EST,18-39: ICD-10-PCS | Mod: S$GLB,,, | Performed by: OBSTETRICS & GYNECOLOGY

## 2022-03-08 PROCEDURE — 1160F PR REVIEW ALL MEDS BY PRESCRIBER/CLIN PHARMACIST DOCUMENTED: ICD-10-PCS | Mod: CPTII,S$GLB,, | Performed by: OBSTETRICS & GYNECOLOGY

## 2022-03-08 PROCEDURE — 87591 N.GONORRHOEAE DNA AMP PROB: CPT | Performed by: OBSTETRICS & GYNECOLOGY

## 2022-03-08 PROCEDURE — 3078F PR MOST RECENT DIASTOLIC BLOOD PRESSURE < 80 MM HG: ICD-10-PCS | Mod: CPTII,S$GLB,, | Performed by: OBSTETRICS & GYNECOLOGY

## 2022-03-08 PROCEDURE — 3074F PR MOST RECENT SYSTOLIC BLOOD PRESSURE < 130 MM HG: ICD-10-PCS | Mod: CPTII,S$GLB,, | Performed by: OBSTETRICS & GYNECOLOGY

## 2022-03-08 PROCEDURE — 1159F PR MEDICATION LIST DOCUMENTED IN MEDICAL RECORD: ICD-10-PCS | Mod: CPTII,S$GLB,, | Performed by: OBSTETRICS & GYNECOLOGY

## 2022-03-08 PROCEDURE — 3008F BODY MASS INDEX DOCD: CPT | Mod: CPTII,S$GLB,, | Performed by: OBSTETRICS & GYNECOLOGY

## 2022-03-08 PROCEDURE — 3074F SYST BP LT 130 MM HG: CPT | Mod: CPTII,S$GLB,, | Performed by: OBSTETRICS & GYNECOLOGY

## 2022-03-08 PROCEDURE — 3078F DIAST BP <80 MM HG: CPT | Mod: CPTII,S$GLB,, | Performed by: OBSTETRICS & GYNECOLOGY

## 2022-03-08 PROCEDURE — 1160F RVW MEDS BY RX/DR IN RCRD: CPT | Mod: CPTII,S$GLB,, | Performed by: OBSTETRICS & GYNECOLOGY

## 2022-03-08 PROCEDURE — 99395 PREV VISIT EST AGE 18-39: CPT | Mod: S$GLB,,, | Performed by: OBSTETRICS & GYNECOLOGY

## 2022-03-08 PROCEDURE — 1159F MED LIST DOCD IN RCRD: CPT | Mod: CPTII,S$GLB,, | Performed by: OBSTETRICS & GYNECOLOGY

## 2022-03-08 NOTE — PATIENT INSTRUCTIONS
Please check out the [employer] PATIENT WEBSITE.  The site has education materials, patient stories, expert views, and a portal for you to ask questions.       [email or web address]      As always, please let me know if you have any questions.     Dr. Ivanna Puente

## 2022-03-08 NOTE — PROGRESS NOTES
Chief Complaint: Well Woman Exam     HPI:      Madeline Collette Evans is a 26 y.o. No obstetric history on file. who presents today for well woman exam.  LMP: Patient's last menstrual period was 03/03/2022.  No issues, problems, or complaints. Specifically, patient denies abnormal vaginal bleeding, discharge, pelvic pain, urinary problems, or changes in appetite. Ms. Kumar is currently sexually active with a single male partner. She is currently using oral contraceptives (estrogen/progesterone) for contraception desires Mirena IUD. She would like STD screening today.    Previous Pap:  no abnormalities (12/7/2019)  Previous Mammogram:  No results found for this or any previous visit.  Most Recent Dexa: N/a  Colonoscopy: N/a    COVID: Completed  Flu: Completed  Gardasil:Completed     Patient Active Problem List   Diagnosis    Foot pain    Obsessive compulsive disorder    Iron deficiency    B12 deficiency    Mediastinal lymphadenopathy    Adenopathy    Dysphagia    SOB (shortness of breath)    Pain of anterior chest wall with respiration    History of histoplasmosis       Past Medical History:   Diagnosis Date    Abnormal Pap smear of cervix     B12 deficiency     Bartonella infection     BRCA negative     Histoplasmosis 2017    full recovery    Iron deficiency     Mastocytosis     Mediastinal adenopathy \    Obsessive compulsive disorder     followed by Dr. Malone       Past Surgical History:   Procedure Laterality Date    BRONCHOSCOPY      LUNG BIOPSY  03/2017    mediastinoscopy  05/2017       OB History    No obstetric history on file.         ROS:     Review of Systems   Constitutional: Negative for activity change, appetite change, chills, fatigue and fever.   Respiratory: Negative for shortness of breath.    Cardiovascular: Negative for chest pain.   Gastrointestinal: Negative for abdominal pain, constipation, diarrhea, nausea and vomiting.   Endocrine: Negative for cold intolerance and heat  "intolerance.   Genitourinary: Negative for dysuria, flank pain, frequency, hematuria, menstrual irregularity, pelvic pain, urgency and vaginal discharge.   Musculoskeletal: Negative for back pain.   Integumentary:  Negative for rash, breast mass, breast discharge and breast tenderness.   Neurological: Negative for headaches.   Psychiatric/Behavioral: Negative for dysphoric mood. The patient is not nervous/anxious.    Breast: Negative for mass and tenderness      Physical Exam:      PHYSICAL EXAM:  /66   Ht 5' 3" (1.6 m)   Wt 58.1 kg (128 lb)   LMP 03/03/2022   BMI 22.67 kg/m²   Body mass index is 22.67 kg/m².     APPEARANCE: Well nourished, well developed, in no acute distress.  PSYCH: Appropriate mood and affect.  SKIN: No acne or hirsutism  NECK: Neck symmetric without masses or thyromegaly  NODES: No inguinal, axillary, or supraclavicular lymph node enlargement  ABDOMEN: Soft.  No tenderness or masses.    CARDIOVASCULAR: No edema of peripheral extremities  BREASTS: Symmetrical, no skin changes or visible lesions.  No palpable masses or nipple discharge bilaterally.  PELVIC: Normal external genitalia without lesions.  Normal hair distribution.  Adequate perineal body, normal urethral meatus.  Vagina moist and well rugated without lesions or discharge.  Cervix pink, without lesions, discharge or tenderness.  No significant cystocele or rectocele.  Bimanual exam shows uterus to be normal size, regular, mobile and nontender.  Adnexa without masses or tenderness.      Assessment:     1. Encounter for annual routine gynecological examination     2. Encounter for other general counseling or advice on contraception     3. Breast cancer screening other than mammogram     4. Encounter for initial prescription of intrauterine contraceptive device (IUD)  Device Authorization Order         Plan:     1. Clinical breast exam performed.  2. Pap UTD.  3. Mammogram at 41.  Reviewed TC score: 12%.  4. STD screening.  The " risks of, benefits of, and alternatives of various forms of contraception were discussed at this visit. After a discussion of the R/B/A of fertility awareness, barrier contraception, hormonal pills, injections, patches, rings, hormonal and non-hormonal IUDs, and the subdermal implant, all of  questions were answered, and she has opted for Mirena IUD.   Follow up in about 1 week (around 3/15/2022) for IUD insertion.    Counseling:     Patient was counseled today on current ASCCP pap guidelines, the recommendation for yearly pelvic exams, healthy diet and exercise routines, breast self awareness.She is to see her PCP for other health maintenance.     Use of the [email or web address] Patient Portal discussed and encouraged during today's visit.         Ivanna Puente MD  [facility]  03/08/2022

## 2022-03-09 LAB
C TRACH DNA SPEC QL NAA+PROBE: NOT DETECTED
N GONORRHOEA DNA SPEC QL NAA+PROBE: NOT DETECTED

## 2022-03-16 ENCOUNTER — PROCEDURE VISIT (OUTPATIENT)
Dept: OBSTETRICS AND GYNECOLOGY | Facility: CLINIC | Age: 27
End: 2022-03-16
Payer: COMMERCIAL

## 2022-03-16 VITALS
HEIGHT: 63 IN | DIASTOLIC BLOOD PRESSURE: 66 MMHG | SYSTOLIC BLOOD PRESSURE: 112 MMHG | BODY MASS INDEX: 23.17 KG/M2 | WEIGHT: 130.75 LBS

## 2022-03-16 DIAGNOSIS — Z30.430 ENCOUNTER FOR IUD INSERTION: Primary | ICD-10-CM

## 2022-03-16 LAB
B-HCG UR QL: NEGATIVE
CTP QC/QA: YES

## 2022-03-16 PROCEDURE — 81025 URINE PREGNANCY TEST: CPT | Mod: S$GLB,,, | Performed by: OBSTETRICS & GYNECOLOGY

## 2022-03-16 PROCEDURE — 99499 NO LOS: ICD-10-PCS | Mod: S$GLB,,, | Performed by: OBSTETRICS & GYNECOLOGY

## 2022-03-16 PROCEDURE — 81025 POCT URINE PREGNANCY: ICD-10-PCS | Mod: S$GLB,,, | Performed by: OBSTETRICS & GYNECOLOGY

## 2022-03-16 PROCEDURE — 58300 INSERTION OF IUD: ICD-10-PCS | Mod: S$GLB,,, | Performed by: OBSTETRICS & GYNECOLOGY

## 2022-03-16 PROCEDURE — 58300 INSERT INTRAUTERINE DEVICE: CPT | Mod: S$GLB,,, | Performed by: OBSTETRICS & GYNECOLOGY

## 2022-03-16 PROCEDURE — 99499 UNLISTED E&M SERVICE: CPT | Mod: S$GLB,,, | Performed by: OBSTETRICS & GYNECOLOGY

## 2022-03-16 NOTE — PROCEDURES
Insertion of IUD    Date/Time: 3/16/2022 9:30 AM  Performed by: Ivanna Puente MD  Authorized by: Ivanna Puente MD     Consent:     Consent obtained:  Written    Consent given by:  Patient    Procedure risks and benefits discussed: yes      Patient questions answered: yes      Patient agrees, verbalizes understanding, and wants to proceed: yes      Instructions and paperwork completed: yes    Procedure:     Pelvic exam performed: yes      Negative GC/chlamydia test: yes      Negative urine pregnancy test: yes      Cervix cleaned and prepped: yes      Speculum placed in vagina: yes      Tenaculum applied to cervix: yes      Uterus sounded: yes      IUD inserted with no complications: yes      Strings trimmed: yes    1 Intra Uterine Device levonorgestreL 20 mcg/24 hours (7 yrs) 52 mg       Post-procedure:     Patient tolerated procedure well: yes      Patient will follow up after next period: yes

## 2022-03-16 NOTE — PATIENT INSTRUCTIONS
IUD Aftercare Instructions    Cramping is common after IUD placement.  - You can help relieve the discomfort with heating pads, Tylenol, Aspirin or Ibuprofen  (If you are allergic to these products do not take them. Take another form of pain reliever.)    Irregular bleeding and spotting is normal for the first few months after the IUD is placed.   - Some women may experience irregular bleeding/spotting for up to six months after placement.  - This bleeding can be annoying at first but usually will become lighter as time progresses.    Your period will likely be shorter and lighter with a hormonal IUD.    If you had the IUD placed for birth control:  - The Mirena, Kyleena, Leah and Liletta IUDs are effective immediately if it was inserted within 7 days after the start of you period. If it was inserted any other times, use another method of birth control, like condoms for at least 7 days.  - The Paragard IUD is effective immediately.    It is possible for the IUD to come out of the uterus.  - If it does slip out of place, it is most likely to happen in the first few months after being inserted.     To make sure your IUD is in place, you can feel for the IUD strings between periods.  - Place one finger into your vagina until you feel your cervix. It will feel hard and rubbery, like the end of your nose.  - The string ends should be coming through your cervix. Do not pull on the strings  - If the strings feel much longer than before, if you feel the hard plastic part of the IUD, or if you cannot feel the strings at all, the IUD may have moved out of place. Please call the clinic to make an appointment. Consider using a backup form of birth control, like a condom, until you are seen.     Keep your follow-up appointment for 4 weeks after the IUD has been placed.    Pregnancy is unlikely after IUD placement, but can happen. Please call the clinic if you have any concerns of if your  pregnancy test is positive.     The IUD should only be removed by a healthcare provider.   - The Mirena IUD should be removed and/or replaced after 7 years.      Warning Signs/Call the clinic if any of the following occurs:  - Severe abdominal pain or cramping, unusual bleeding, fever or chills, foul smelling vaginal discharge, painful intercourse, or a positive pregnancy test.

## 2022-03-29 ENCOUNTER — PATIENT MESSAGE (OUTPATIENT)
Dept: OBSTETRICS AND GYNECOLOGY | Facility: CLINIC | Age: 27
End: 2022-03-29
Payer: COMMERCIAL

## 2022-04-04 ENCOUNTER — OFFICE VISIT (OUTPATIENT)
Dept: OBSTETRICS AND GYNECOLOGY | Facility: CLINIC | Age: 27
End: 2022-04-04
Payer: COMMERCIAL

## 2022-04-04 VITALS
HEIGHT: 63 IN | WEIGHT: 129.94 LBS | SYSTOLIC BLOOD PRESSURE: 116 MMHG | BODY MASS INDEX: 23.02 KG/M2 | DIASTOLIC BLOOD PRESSURE: 84 MMHG

## 2022-04-04 DIAGNOSIS — Z30.431 ENCOUNTER FOR ROUTINE CHECKING OF INTRAUTERINE CONTRACEPTIVE DEVICE (IUD): Primary | ICD-10-CM

## 2022-04-04 PROCEDURE — 3008F PR BODY MASS INDEX (BMI) DOCUMENTED: ICD-10-PCS | Mod: CPTII,S$GLB,, | Performed by: NURSE PRACTITIONER

## 2022-04-04 PROCEDURE — 1160F PR REVIEW ALL MEDS BY PRESCRIBER/CLIN PHARMACIST DOCUMENTED: ICD-10-PCS | Mod: CPTII,S$GLB,, | Performed by: NURSE PRACTITIONER

## 2022-04-04 PROCEDURE — 3074F SYST BP LT 130 MM HG: CPT | Mod: CPTII,S$GLB,, | Performed by: NURSE PRACTITIONER

## 2022-04-04 PROCEDURE — 3008F BODY MASS INDEX DOCD: CPT | Mod: CPTII,S$GLB,, | Performed by: NURSE PRACTITIONER

## 2022-04-04 PROCEDURE — 3079F DIAST BP 80-89 MM HG: CPT | Mod: CPTII,S$GLB,, | Performed by: NURSE PRACTITIONER

## 2022-04-04 PROCEDURE — 1160F RVW MEDS BY RX/DR IN RCRD: CPT | Mod: CPTII,S$GLB,, | Performed by: NURSE PRACTITIONER

## 2022-04-04 PROCEDURE — 99213 PR OFFICE/OUTPT VISIT, EST, LEVL III, 20-29 MIN: ICD-10-PCS | Mod: S$GLB,,, | Performed by: NURSE PRACTITIONER

## 2022-04-04 PROCEDURE — 3074F PR MOST RECENT SYSTOLIC BLOOD PRESSURE < 130 MM HG: ICD-10-PCS | Mod: CPTII,S$GLB,, | Performed by: NURSE PRACTITIONER

## 2022-04-04 PROCEDURE — 99999 PR PBB SHADOW E&M-EST. PATIENT-LVL III: ICD-10-PCS | Mod: PBBFAC,,, | Performed by: NURSE PRACTITIONER

## 2022-04-04 PROCEDURE — 99213 OFFICE O/P EST LOW 20 MIN: CPT | Mod: S$GLB,,, | Performed by: NURSE PRACTITIONER

## 2022-04-04 PROCEDURE — 3079F PR MOST RECENT DIASTOLIC BLOOD PRESSURE 80-89 MM HG: ICD-10-PCS | Mod: CPTII,S$GLB,, | Performed by: NURSE PRACTITIONER

## 2022-04-04 PROCEDURE — 99999 PR PBB SHADOW E&M-EST. PATIENT-LVL III: CPT | Mod: PBBFAC,,, | Performed by: NURSE PRACTITIONER

## 2022-04-04 PROCEDURE — 1159F PR MEDICATION LIST DOCUMENTED IN MEDICAL RECORD: ICD-10-PCS | Mod: CPTII,S$GLB,, | Performed by: NURSE PRACTITIONER

## 2022-04-04 PROCEDURE — 1159F MED LIST DOCD IN RCRD: CPT | Mod: CPTII,S$GLB,, | Performed by: NURSE PRACTITIONER

## 2022-04-04 NOTE — PROGRESS NOTES
CC: IUD check    Pt is a 27 y/o female  presents for IUD check. Patient had a Mirena placed on 3/2022. She is not having problems with bleeding, cramping, fever or discharge. She has not tried to feel the strings.       ROS:  GENERAL: Feeling well overall. Denies fever or chills.   SKIN: Denies rash or lesions.   HEAD: Denies head injury or headache.   NODES: Denies enlarged lymph nodes.   CHEST: Denies chest pain or shortness of breath.   CARDIOVASCULAR: Denies palpitations or left sided chest pain.   ABDOMEN: No abdominal pain, constipation, diarrhea, nausea, vomiting or rectal bleeding.   URINARY: No dysuria, hematuria, or burning on urination.  REPRODUCTIVE: See HPI.   BREASTS: Denies pain, lumps, or nipple discharge.   HEMATOLOGIC: No easy bruisability or excessive bleeding.   MUSCULOSKELETAL: Denies joint pain or swelling.   NEUROLOGIC: Denies syncope or weakness.   PSYCHIATRIC: Denies depression, anxiety or mood swings.      PHYSICAL EXAM:  Abdomen: Soft, non-tender, non-distended  Vulva: No lesions  Vaginal: No lesions, no abnormal discharge  Cervix: No discharge, no CMT, IUD strings visible at os (2 cm out)  Uterus: Normal size, non-tender  Adnexa: No masses, non-tender    ASSESSMENT AND PLAN:  1. IUD surveillance    Patient counseled on IUD danger signs and how to check the strings reviewed.    Return for annual GYN exam      PRABHU Moralez

## 2022-05-17 ENCOUNTER — LAB VISIT (OUTPATIENT)
Dept: LAB | Facility: HOSPITAL | Age: 27
End: 2022-05-17
Payer: COMMERCIAL

## 2022-05-17 ENCOUNTER — OFFICE VISIT (OUTPATIENT)
Dept: INTERNAL MEDICINE | Facility: CLINIC | Age: 27
End: 2022-05-17
Payer: COMMERCIAL

## 2022-05-17 VITALS
SYSTOLIC BLOOD PRESSURE: 104 MMHG | WEIGHT: 132.63 LBS | HEIGHT: 63 IN | DIASTOLIC BLOOD PRESSURE: 60 MMHG | BODY MASS INDEX: 23.5 KG/M2 | OXYGEN SATURATION: 99 % | HEART RATE: 86 BPM

## 2022-05-17 DIAGNOSIS — Z00.00 ANNUAL PHYSICAL EXAM: Primary | ICD-10-CM

## 2022-05-17 DIAGNOSIS — Z00.00 ANNUAL PHYSICAL EXAM: ICD-10-CM

## 2022-05-17 DIAGNOSIS — E53.8 B12 DEFICIENCY: ICD-10-CM

## 2022-05-17 DIAGNOSIS — E61.1 IRON DEFICIENCY: ICD-10-CM

## 2022-05-17 LAB
ALBUMIN SERPL BCP-MCNC: 4.4 G/DL (ref 3.5–5.2)
ALP SERPL-CCNC: 55 U/L (ref 55–135)
ALT SERPL W/O P-5'-P-CCNC: 17 U/L (ref 10–44)
ANION GAP SERPL CALC-SCNC: 13 MMOL/L (ref 8–16)
AST SERPL-CCNC: 31 U/L (ref 10–40)
BASOPHILS # BLD AUTO: 0.05 K/UL (ref 0–0.2)
BASOPHILS NFR BLD: 0.8 % (ref 0–1.9)
BILIRUB SERPL-MCNC: 0.8 MG/DL (ref 0.1–1)
BUN SERPL-MCNC: 15 MG/DL (ref 6–20)
CALCIUM SERPL-MCNC: 10.2 MG/DL (ref 8.7–10.5)
CHLORIDE SERPL-SCNC: 102 MMOL/L (ref 95–110)
CO2 SERPL-SCNC: 23 MMOL/L (ref 23–29)
CREAT SERPL-MCNC: 0.9 MG/DL (ref 0.5–1.4)
DIFFERENTIAL METHOD: NORMAL
EOSINOPHIL # BLD AUTO: 0.1 K/UL (ref 0–0.5)
EOSINOPHIL NFR BLD: 1.1 % (ref 0–8)
ERYTHROCYTE [DISTWIDTH] IN BLOOD BY AUTOMATED COUNT: 12.4 % (ref 11.5–14.5)
EST. GFR  (AFRICAN AMERICAN): >60 ML/MIN/1.73 M^2
EST. GFR  (NON AFRICAN AMERICAN): >60 ML/MIN/1.73 M^2
FERRITIN SERPL-MCNC: 77 NG/ML (ref 20–300)
GLUCOSE SERPL-MCNC: 85 MG/DL (ref 70–110)
HCT VFR BLD AUTO: 40.9 % (ref 37–48.5)
HGB BLD-MCNC: 13.4 G/DL (ref 12–16)
IMM GRANULOCYTES # BLD AUTO: 0.02 K/UL (ref 0–0.04)
IMM GRANULOCYTES NFR BLD AUTO: 0.3 % (ref 0–0.5)
IRON SERPL-MCNC: 137 UG/DL (ref 30–160)
LYMPHOCYTES # BLD AUTO: 1.4 K/UL (ref 1–4.8)
LYMPHOCYTES NFR BLD: 22.3 % (ref 18–48)
MCH RBC QN AUTO: 30.2 PG (ref 27–31)
MCHC RBC AUTO-ENTMCNC: 32.8 G/DL (ref 32–36)
MCV RBC AUTO: 92 FL (ref 82–98)
MONOCYTES # BLD AUTO: 0.4 K/UL (ref 0.3–1)
MONOCYTES NFR BLD: 5.9 % (ref 4–15)
NEUTROPHILS # BLD AUTO: 4.2 K/UL (ref 1.8–7.7)
NEUTROPHILS NFR BLD: 69.6 % (ref 38–73)
NRBC BLD-RTO: 0 /100 WBC
PLATELET # BLD AUTO: 273 K/UL (ref 150–450)
PMV BLD AUTO: 10.8 FL (ref 9.2–12.9)
POTASSIUM SERPL-SCNC: 4.9 MMOL/L (ref 3.5–5.1)
PROT SERPL-MCNC: 7.3 G/DL (ref 6–8.4)
RBC # BLD AUTO: 4.43 M/UL (ref 4–5.4)
SATURATED IRON: 29 % (ref 20–50)
SODIUM SERPL-SCNC: 138 MMOL/L (ref 136–145)
TOTAL IRON BINDING CAPACITY: 480 UG/DL (ref 250–450)
TRANSFERRIN SERPL-MCNC: 324 MG/DL (ref 200–375)
TSH SERPL DL<=0.005 MIU/L-ACNC: 1.01 UIU/ML (ref 0.4–4)
VIT B12 SERPL-MCNC: 399 PG/ML (ref 210–950)
WBC # BLD AUTO: 6.09 K/UL (ref 3.9–12.7)

## 2022-05-17 PROCEDURE — 99395 PREV VISIT EST AGE 18-39: CPT | Mod: S$GLB,,, | Performed by: PHYSICIAN ASSISTANT

## 2022-05-17 PROCEDURE — 3008F PR BODY MASS INDEX (BMI) DOCUMENTED: ICD-10-PCS | Mod: CPTII,S$GLB,, | Performed by: PHYSICIAN ASSISTANT

## 2022-05-17 PROCEDURE — 3074F SYST BP LT 130 MM HG: CPT | Mod: CPTII,S$GLB,, | Performed by: PHYSICIAN ASSISTANT

## 2022-05-17 PROCEDURE — 80053 COMPREHEN METABOLIC PANEL: CPT | Performed by: PHYSICIAN ASSISTANT

## 2022-05-17 PROCEDURE — 1160F RVW MEDS BY RX/DR IN RCRD: CPT | Mod: CPTII,S$GLB,, | Performed by: PHYSICIAN ASSISTANT

## 2022-05-17 PROCEDURE — 3074F PR MOST RECENT SYSTOLIC BLOOD PRESSURE < 130 MM HG: ICD-10-PCS | Mod: CPTII,S$GLB,, | Performed by: PHYSICIAN ASSISTANT

## 2022-05-17 PROCEDURE — 1160F PR REVIEW ALL MEDS BY PRESCRIBER/CLIN PHARMACIST DOCUMENTED: ICD-10-PCS | Mod: CPTII,S$GLB,, | Performed by: PHYSICIAN ASSISTANT

## 2022-05-17 PROCEDURE — 3008F BODY MASS INDEX DOCD: CPT | Mod: CPTII,S$GLB,, | Performed by: PHYSICIAN ASSISTANT

## 2022-05-17 PROCEDURE — 84466 ASSAY OF TRANSFERRIN: CPT | Performed by: PHYSICIAN ASSISTANT

## 2022-05-17 PROCEDURE — 84443 ASSAY THYROID STIM HORMONE: CPT | Performed by: PHYSICIAN ASSISTANT

## 2022-05-17 PROCEDURE — 99999 PR PBB SHADOW E&M-EST. PATIENT-LVL IV: CPT | Mod: PBBFAC,,, | Performed by: PHYSICIAN ASSISTANT

## 2022-05-17 PROCEDURE — 3078F DIAST BP <80 MM HG: CPT | Mod: CPTII,S$GLB,, | Performed by: PHYSICIAN ASSISTANT

## 2022-05-17 PROCEDURE — 1159F MED LIST DOCD IN RCRD: CPT | Mod: CPTII,S$GLB,, | Performed by: PHYSICIAN ASSISTANT

## 2022-05-17 PROCEDURE — 82728 ASSAY OF FERRITIN: CPT | Performed by: PHYSICIAN ASSISTANT

## 2022-05-17 PROCEDURE — 36415 COLL VENOUS BLD VENIPUNCTURE: CPT | Performed by: PHYSICIAN ASSISTANT

## 2022-05-17 PROCEDURE — 99395 PR PREVENTIVE VISIT,EST,18-39: ICD-10-PCS | Mod: S$GLB,,, | Performed by: PHYSICIAN ASSISTANT

## 2022-05-17 PROCEDURE — 99999 PR PBB SHADOW E&M-EST. PATIENT-LVL IV: ICD-10-PCS | Mod: PBBFAC,,, | Performed by: PHYSICIAN ASSISTANT

## 2022-05-17 PROCEDURE — 3078F PR MOST RECENT DIASTOLIC BLOOD PRESSURE < 80 MM HG: ICD-10-PCS | Mod: CPTII,S$GLB,, | Performed by: PHYSICIAN ASSISTANT

## 2022-05-17 PROCEDURE — 82607 VITAMIN B-12: CPT | Performed by: PHYSICIAN ASSISTANT

## 2022-05-17 PROCEDURE — 85025 COMPLETE CBC W/AUTO DIFF WBC: CPT | Performed by: PHYSICIAN ASSISTANT

## 2022-05-17 PROCEDURE — 1159F PR MEDICATION LIST DOCUMENTED IN MEDICAL RECORD: ICD-10-PCS | Mod: CPTII,S$GLB,, | Performed by: PHYSICIAN ASSISTANT

## 2022-05-17 NOTE — PROGRESS NOTES
Subjective:       Patient ID: Madeline Collette Evans is a 26 y.o. female.        Chief Complaint: Annual Exam    Madeline Collette Evans is an established patient of [facility] here today for annual exam.    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  Family is here in [place].  She has been off for 6 months so gotten some time to relax, which has been helpful.    B12 deficiency and off supplement x 9 months    H/o iron deficiency, now has Mirena since 3/2022 and really does not have a menstrual cycle, previously cycles were not too heavy    She already had TB skin test, for which appointment was originally scheduled         Review of Systems   Constitutional: Negative for chills, diaphoresis, fatigue and fever.   HENT: Negative for congestion and sore throat.    Eyes: Negative for visual disturbance.   Respiratory: Negative for cough, chest tightness and shortness of breath.    Cardiovascular: Negative for chest pain, palpitations and leg swelling.   Gastrointestinal: Negative for abdominal pain, blood in stool, constipation, diarrhea, nausea and vomiting.   Genitourinary: Negative for dysuria, frequency, hematuria and urgency.   Musculoskeletal: Negative for arthralgias and back pain.   Skin: Negative for rash.   Neurological: Negative for dizziness, syncope, weakness and headaches.   Psychiatric/Behavioral: Negative for dysphoric mood and sleep disturbance. The patient is not nervous/anxious.        Objective:      Physical Exam  Vitals and nursing note reviewed.   Constitutional:       Appearance: Normal appearance. She is well-developed.   HENT:      Head: Normocephalic.      Right Ear: External ear normal.      Left Ear: External ear normal.   Eyes:      Pupils: Pupils are equal, round, and reactive to light.   Cardiovascular:      Rate and Rhythm: Normal rate and regular rhythm.      Heart sounds: Normal heart sounds. No murmur heard.    No  "friction rub. No gallop.   Pulmonary:      Effort: Pulmonary effort is normal. No respiratory distress.      Breath sounds: Normal breath sounds.   Abdominal:      Palpations: Abdomen is soft.      Tenderness: There is no abdominal tenderness.   Skin:     General: Skin is warm and dry.   Neurological:      Mental Status: She is alert.         Assessment:       1. Annual physical exam    2. B12 deficiency    3. Iron deficiency        Plan:       Donna was seen today for annual exam.    Diagnoses and all orders for this visit:    Annual physical exam  -     CBC Auto Differential; Future  -     Comprehensive Metabolic Panel; Future  -     Ferritin; Future  -     Iron and TIBC; Future  -     Vitamin B12; Future  -     TSH; Future    B12 deficiency    Iron deficiency    Labs today to check all levels  She has been off b12 supplement    Pt has been given instructions populated from [employer] database and has verbalized understanding of the after visit summary and information contained wherein.    Follow up with a primary care provider. May go to ER for acute shortness of breath, lightheadedness, fever, or any other emergent complaints or changes in condition.    "This note will be shared with the patient"    No future appointments.              "
